# Patient Record
Sex: MALE | Race: BLACK OR AFRICAN AMERICAN | NOT HISPANIC OR LATINO | URBAN - METROPOLITAN AREA
[De-identification: names, ages, dates, MRNs, and addresses within clinical notes are randomized per-mention and may not be internally consistent; named-entity substitution may affect disease eponyms.]

---

## 2019-02-20 ENCOUNTER — EMERGENCY (EMERGENCY)
Facility: HOSPITAL | Age: 64
LOS: 0 days | Discharge: ANOTHER HOSPITAL | End: 2019-02-20
Attending: EMERGENCY MEDICINE
Payer: COMMERCIAL

## 2019-02-20 ENCOUNTER — INPATIENT (INPATIENT)
Facility: HOSPITAL | Age: 64
LOS: 1 days | Discharge: HOME | DRG: 287 | End: 2019-02-22
Attending: INTERNAL MEDICINE | Admitting: INTERNAL MEDICINE
Payer: COMMERCIAL

## 2019-02-20 VITALS
RESPIRATION RATE: 23 BRPM | WEIGHT: 168.65 LBS | HEIGHT: 71 IN | SYSTOLIC BLOOD PRESSURE: 119 MMHG | HEART RATE: 76 BPM | DIASTOLIC BLOOD PRESSURE: 82 MMHG | OXYGEN SATURATION: 99 % | TEMPERATURE: 98 F

## 2019-02-20 VITALS — SYSTOLIC BLOOD PRESSURE: 102 MMHG | DIASTOLIC BLOOD PRESSURE: 69 MMHG | HEART RATE: 116 BPM

## 2019-02-20 VITALS
SYSTOLIC BLOOD PRESSURE: 104 MMHG | WEIGHT: 173.94 LBS | RESPIRATION RATE: 19 BRPM | OXYGEN SATURATION: 98 % | HEART RATE: 120 BPM | DIASTOLIC BLOOD PRESSURE: 53 MMHG | TEMPERATURE: 98 F | HEIGHT: 71 IN

## 2019-02-20 DIAGNOSIS — I24.9 ACUTE ISCHEMIC HEART DISEASE, UNSPECIFIED: ICD-10-CM

## 2019-02-20 DIAGNOSIS — R07.9 CHEST PAIN, UNSPECIFIED: ICD-10-CM

## 2019-02-20 DIAGNOSIS — I48.91 UNSPECIFIED ATRIAL FIBRILLATION: ICD-10-CM

## 2019-02-20 DIAGNOSIS — R00.2 PALPITATIONS: ICD-10-CM

## 2019-02-20 DIAGNOSIS — J45.909 UNSPECIFIED ASTHMA, UNCOMPLICATED: ICD-10-CM

## 2019-02-20 DIAGNOSIS — I47.2 VENTRICULAR TACHYCARDIA: ICD-10-CM

## 2019-02-20 LAB
ALBUMIN SERPL ELPH-MCNC: 3.3 G/DL — SIGNIFICANT CHANGE UP (ref 3.3–5)
ALBUMIN SERPL ELPH-MCNC: 3.5 G/DL — SIGNIFICANT CHANGE UP (ref 3.3–5)
ALP SERPL-CCNC: 102 U/L — SIGNIFICANT CHANGE UP (ref 40–120)
ALP SERPL-CCNC: 116 U/L — SIGNIFICANT CHANGE UP (ref 40–120)
ALT FLD-CCNC: 135 U/L — HIGH (ref 10–45)
ALT FLD-CCNC: 160 U/L — HIGH (ref 12–78)
ANION GAP SERPL CALC-SCNC: 11 MMOL/L — SIGNIFICANT CHANGE UP (ref 5–17)
ANION GAP SERPL CALC-SCNC: 13 MMOL/L — SIGNIFICANT CHANGE UP (ref 5–17)
APTT BLD: 34.8 SEC — SIGNIFICANT CHANGE UP (ref 28.5–37)
APTT BLD: >200 SEC — CRITICAL HIGH (ref 27.5–36.3)
AST SERPL-CCNC: 115 U/L — HIGH (ref 10–40)
AST SERPL-CCNC: 130 U/L — HIGH (ref 15–37)
BILIRUB SERPL-MCNC: 0.6 MG/DL — SIGNIFICANT CHANGE UP (ref 0.2–1.2)
BILIRUB SERPL-MCNC: 0.9 MG/DL — SIGNIFICANT CHANGE UP (ref 0.2–1.2)
BLD GP AB SCN SERPL QL: NEGATIVE — SIGNIFICANT CHANGE UP
BUN SERPL-MCNC: 12 MG/DL — SIGNIFICANT CHANGE UP (ref 7–23)
BUN SERPL-MCNC: 12 MG/DL — SIGNIFICANT CHANGE UP (ref 7–23)
CALCIUM SERPL-MCNC: 8.7 MG/DL — SIGNIFICANT CHANGE UP (ref 8.5–10.1)
CALCIUM SERPL-MCNC: 8.9 MG/DL — SIGNIFICANT CHANGE UP (ref 8.4–10.5)
CHLORIDE SERPL-SCNC: 102 MMOL/L — SIGNIFICANT CHANGE UP (ref 96–108)
CHLORIDE SERPL-SCNC: 98 MMOL/L — SIGNIFICANT CHANGE UP (ref 96–108)
CK MB BLD-MCNC: 1.4 % — SIGNIFICANT CHANGE UP (ref 0–3.5)
CK MB BLD-MCNC: 2.7 % — SIGNIFICANT CHANGE UP (ref 0–3.5)
CK MB CFR SERPL CALC: 1.9 NG/ML — SIGNIFICANT CHANGE UP (ref 0.5–3.6)
CK MB CFR SERPL CALC: 3.1 NG/ML — SIGNIFICANT CHANGE UP (ref 0–6.7)
CK SERPL-CCNC: 116 U/L — SIGNIFICANT CHANGE UP (ref 30–200)
CK SERPL-CCNC: 137 U/L — SIGNIFICANT CHANGE UP (ref 26–308)
CO2 SERPL-SCNC: 25 MMOL/L — SIGNIFICANT CHANGE UP (ref 22–31)
CO2 SERPL-SCNC: 26 MMOL/L — SIGNIFICANT CHANGE UP (ref 22–31)
CREAT SERPL-MCNC: 1.21 MG/DL — SIGNIFICANT CHANGE UP (ref 0.5–1.3)
CREAT SERPL-MCNC: 1.63 MG/DL — HIGH (ref 0.5–1.3)
GLUCOSE SERPL-MCNC: 124 MG/DL — HIGH (ref 70–99)
GLUCOSE SERPL-MCNC: 191 MG/DL — HIGH (ref 70–99)
HCT VFR BLD CALC: 46.1 % — SIGNIFICANT CHANGE UP (ref 39–50)
HCT VFR BLD CALC: 48.4 % — SIGNIFICANT CHANGE UP (ref 39–50)
HGB BLD-MCNC: 15.4 G/DL — SIGNIFICANT CHANGE UP (ref 13–17)
HGB BLD-MCNC: 15.6 G/DL — SIGNIFICANT CHANGE UP (ref 13–17)
INR BLD: 2.84 RATIO — HIGH (ref 0.88–1.16)
INR BLD: 3.08 RATIO — HIGH (ref 0.88–1.16)
MAGNESIUM SERPL-MCNC: 1.8 MG/DL — SIGNIFICANT CHANGE UP (ref 1.6–2.6)
MCHC RBC-ENTMCNC: 27.8 PG — SIGNIFICANT CHANGE UP (ref 27–34)
MCHC RBC-ENTMCNC: 29.2 PG — SIGNIFICANT CHANGE UP (ref 27–34)
MCHC RBC-ENTMCNC: 32.2 GM/DL — SIGNIFICANT CHANGE UP (ref 32–36)
MCHC RBC-ENTMCNC: 33.4 GM/DL — SIGNIFICANT CHANGE UP (ref 32–36)
MCV RBC AUTO: 86.3 FL — SIGNIFICANT CHANGE UP (ref 80–100)
MCV RBC AUTO: 87.4 FL — SIGNIFICANT CHANGE UP (ref 80–100)
NRBC # BLD: 0 /100 WBCS — SIGNIFICANT CHANGE UP (ref 0–0)
NT-PROBNP SERPL-SCNC: 1967 PG/ML — HIGH (ref 0–300)
PHOSPHATE SERPL-MCNC: 1.8 MG/DL — LOW (ref 2.5–4.5)
PLATELET # BLD AUTO: 133 K/UL — LOW (ref 150–400)
PLATELET # BLD AUTO: 166 K/UL — SIGNIFICANT CHANGE UP (ref 150–400)
POTASSIUM SERPL-MCNC: 3.6 MMOL/L — SIGNIFICANT CHANGE UP (ref 3.5–5.3)
POTASSIUM SERPL-MCNC: 4.1 MMOL/L — SIGNIFICANT CHANGE UP (ref 3.5–5.3)
POTASSIUM SERPL-SCNC: 3.6 MMOL/L — SIGNIFICANT CHANGE UP (ref 3.5–5.3)
POTASSIUM SERPL-SCNC: 4.1 MMOL/L — SIGNIFICANT CHANGE UP (ref 3.5–5.3)
PROT SERPL-MCNC: 6.2 G/DL — SIGNIFICANT CHANGE UP (ref 6–8.3)
PROT SERPL-MCNC: 6.7 GM/DL — SIGNIFICANT CHANGE UP (ref 6–8.3)
PROTHROM AB SERPL-ACNC: 32.8 SEC — HIGH (ref 10–12.9)
PROTHROM AB SERPL-ACNC: 36.7 SEC — HIGH (ref 10–12.9)
RBC # BLD: 5.27 M/UL — SIGNIFICANT CHANGE UP (ref 4.2–5.8)
RBC # BLD: 5.61 M/UL — SIGNIFICANT CHANGE UP (ref 4.2–5.8)
RBC # FLD: 13.6 % — SIGNIFICANT CHANGE UP (ref 10.3–14.5)
RBC # FLD: 15.5 % — HIGH (ref 10.3–14.5)
RH IG SCN BLD-IMP: POSITIVE — SIGNIFICANT CHANGE UP
SODIUM SERPL-SCNC: 136 MMOL/L — SIGNIFICANT CHANGE UP (ref 135–145)
SODIUM SERPL-SCNC: 139 MMOL/L — SIGNIFICANT CHANGE UP (ref 135–145)
TROPONIN I SERPL-MCNC: <.015 NG/ML — SIGNIFICANT CHANGE UP (ref 0.01–0.04)
TROPONIN T, HIGH SENSITIVITY RESULT: 35 NG/L — SIGNIFICANT CHANGE UP (ref 0–51)
TSH SERPL-MCNC: 9.6 UIU/ML — HIGH (ref 0.36–3.74)
WBC # BLD: 5.02 K/UL — SIGNIFICANT CHANGE UP (ref 3.8–10.5)
WBC # BLD: 8.5 K/UL — SIGNIFICANT CHANGE UP (ref 3.8–10.5)
WBC # FLD AUTO: 5.02 K/UL — SIGNIFICANT CHANGE UP (ref 3.8–10.5)
WBC # FLD AUTO: 8.5 K/UL — SIGNIFICANT CHANGE UP (ref 3.8–10.5)

## 2019-02-20 PROCEDURE — 93306 TTE W/DOPPLER COMPLETE: CPT | Mod: 26

## 2019-02-20 PROCEDURE — 93010 ELECTROCARDIOGRAM REPORT: CPT

## 2019-02-20 PROCEDURE — 71045 X-RAY EXAM CHEST 1 VIEW: CPT | Mod: 26

## 2019-02-20 PROCEDURE — 99291 CRITICAL CARE FIRST HOUR: CPT

## 2019-02-20 RX ORDER — ASPIRIN/CALCIUM CARB/MAGNESIUM 324 MG
81 TABLET ORAL DAILY
Qty: 0 | Refills: 0 | Status: DISCONTINUED | OUTPATIENT
Start: 2019-02-20 | End: 2019-02-22

## 2019-02-20 RX ORDER — AMIODARONE HYDROCHLORIDE 400 MG/1
1 TABLET ORAL
Qty: 900 | Refills: 0 | Status: DISCONTINUED | OUTPATIENT
Start: 2019-02-20 | End: 2019-02-20

## 2019-02-20 RX ORDER — MAGNESIUM SULFATE 500 MG/ML
2 VIAL (ML) INJECTION ONCE
Qty: 0 | Refills: 0 | Status: COMPLETED | OUTPATIENT
Start: 2019-02-20 | End: 2019-02-20

## 2019-02-20 RX ORDER — HEPARIN SODIUM 5000 [USP'U]/ML
INJECTION INTRAVENOUS; SUBCUTANEOUS
Qty: 25000 | Refills: 0 | Status: DISCONTINUED | OUTPATIENT
Start: 2019-02-20 | End: 2019-02-20

## 2019-02-20 RX ORDER — HEPARIN SODIUM 5000 [USP'U]/ML
700 INJECTION INTRAVENOUS; SUBCUTANEOUS
Qty: 25000 | Refills: 0 | Status: DISCONTINUED | OUTPATIENT
Start: 2019-02-20 | End: 2019-02-21

## 2019-02-20 RX ORDER — CHLORHEXIDINE GLUCONATE 213 G/1000ML
1 SOLUTION TOPICAL
Qty: 0 | Refills: 0 | Status: DISCONTINUED | OUTPATIENT
Start: 2019-02-20 | End: 2019-02-22

## 2019-02-20 RX ORDER — FUROSEMIDE 40 MG
20 TABLET ORAL ONCE
Qty: 0 | Refills: 0 | Status: COMPLETED | OUTPATIENT
Start: 2019-02-20 | End: 2019-02-20

## 2019-02-20 RX ORDER — HEPARIN SODIUM 5000 [USP'U]/ML
4000 INJECTION INTRAVENOUS; SUBCUTANEOUS EVERY 6 HOURS
Qty: 0 | Refills: 0 | Status: DISCONTINUED | OUTPATIENT
Start: 2019-02-20 | End: 2019-02-20

## 2019-02-20 RX ORDER — AMIODARONE HYDROCHLORIDE 400 MG/1
150 TABLET ORAL ONCE
Qty: 0 | Refills: 0 | Status: COMPLETED | OUTPATIENT
Start: 2019-02-20 | End: 2019-02-20

## 2019-02-20 RX ORDER — HEPARIN SODIUM 5000 [USP'U]/ML
4700 INJECTION INTRAVENOUS; SUBCUTANEOUS EVERY 6 HOURS
Qty: 0 | Refills: 0 | Status: DISCONTINUED | OUTPATIENT
Start: 2019-02-20 | End: 2019-02-21

## 2019-02-20 RX ORDER — HEPARIN SODIUM 5000 [USP'U]/ML
4000 INJECTION INTRAVENOUS; SUBCUTANEOUS ONCE
Qty: 0 | Refills: 0 | Status: COMPLETED | OUTPATIENT
Start: 2019-02-20 | End: 2019-02-20

## 2019-02-20 RX ORDER — AMIODARONE HYDROCHLORIDE 400 MG/1
400 TABLET ORAL EVERY 8 HOURS
Qty: 0 | Refills: 0 | Status: DISCONTINUED | OUTPATIENT
Start: 2019-02-20 | End: 2019-02-21

## 2019-02-20 RX ORDER — ASPIRIN/CALCIUM CARB/MAGNESIUM 324 MG
325 TABLET ORAL ONCE
Qty: 0 | Refills: 0 | Status: COMPLETED | OUTPATIENT
Start: 2019-02-20 | End: 2019-02-20

## 2019-02-20 RX ADMIN — AMIODARONE HYDROCHLORIDE 400 MILLIGRAM(S): 400 TABLET ORAL at 21:05

## 2019-02-20 RX ADMIN — AMIODARONE HYDROCHLORIDE 618 MILLIGRAM(S): 400 TABLET ORAL at 16:54

## 2019-02-20 RX ADMIN — HEPARIN SODIUM 950 UNIT(S)/HR: 5000 INJECTION INTRAVENOUS; SUBCUTANEOUS at 17:28

## 2019-02-20 RX ADMIN — HEPARIN SODIUM 4000 UNIT(S): 5000 INJECTION INTRAVENOUS; SUBCUTANEOUS at 17:22

## 2019-02-20 RX ADMIN — AMIODARONE HYDROCHLORIDE 33.33 MG/MIN: 400 TABLET ORAL at 17:33

## 2019-02-20 RX ADMIN — HEPARIN SODIUM 950 UNIT(S)/HR: 5000 INJECTION INTRAVENOUS; SUBCUTANEOUS at 22:05

## 2019-02-20 RX ADMIN — Medication 62.5 MILLIMOLE(S): at 23:08

## 2019-02-20 RX ADMIN — Medication 50 GRAM(S): at 22:13

## 2019-02-20 RX ADMIN — Medication 325 MILLIGRAM(S): at 16:59

## 2019-02-20 RX ADMIN — HEPARIN SODIUM 700 UNIT(S)/HR: 5000 INJECTION INTRAVENOUS; SUBCUTANEOUS at 23:12

## 2019-02-20 RX ADMIN — Medication 20 MILLIGRAM(S): at 22:13

## 2019-02-20 RX ADMIN — AMIODARONE HYDROCHLORIDE 33.33 MG/MIN: 400 TABLET ORAL at 20:04

## 2019-02-20 NOTE — ED ADULT NURSE NOTE - CHPI ED NUR SYMPTOMS NEG
no chills/no syncope/no vomiting/no back pain/no shortness of breath/no fever/no congestion/no diaphoresis

## 2019-02-20 NOTE — ED PROVIDER NOTE - OBJECTIVE STATEMENT
64yo male with pmh asthma, Afib on bystolic 5mg and eliquis (since last year) presents with walking up stairs then feeling palpitations, chest tightness, dizziness and weakness. Pt took his meds and attempted to wait it out but kept having palpitations and more weakness so came to er. no dizziness currently.    ROS: No fever/chills. No photophobia/eye pain/changes in vision, No ear pain/sore throat/dysphagia, + chest pain/palpitations. No SOB/cough/stridor. No abdominal pain, N/V/D, no black/bloody bm. No dysuria/frequency/discharge, No headache. + Dizziness.  No rash.  No numbness/tingling/weakness.

## 2019-02-20 NOTE — ED PROVIDER NOTE - PHYSICAL EXAMINATION
Gen: Alert, Well appearing. NAD    Head: NC, AT, PERRL, EOMI, normal lids/conjunctiva   ENT: Bilateral TM WNL, normal hearing, patent oropharynx without erythema/exudate, uvula midline  Neck: supple, no tenderness/meningismus/JVD   Pulm: Bilateral clear BS, normal resp effort, no wheeze/stridor/retractions  CV: + tachy, no M/R/G, +dist pulses   Abd: soft, NT/ND, +BS, no guarding/rebound tenderness  Mskel: no edema/erythema/cyanosis   Skin: no rash   Neuro: AAOx3, no sensory/motor deficits, CN 2-12 intact

## 2019-02-20 NOTE — H&P ADULT - HISTORY OF PRESENT ILLNESS
63-yo M w/ PMH of asthma and Afib on Bystolic and Eliquis (x1y), p/w palpitations and chest tightness. Patient experienced palpitations, chest tightness, and dizziness while walking up stairs. Patient waited until the symptoms go away, however palpitations and weakness worsened. Patient has never experienced these symptoms before, and states he has no history of MI. Per patient, he was undergone exercise stress test and TTE last year. He was told "everything was okay."    In [place] ED, 63-yo M w/ PMH of asthma and Afib on Bystolic and Eliquis (x1y), p/w palpitations and chest tightness. Patient experienced palpitations, chest tightness, and dizziness while walking up stairs. Patient waited until the symptoms go away, however palpitations and weakness worsened. Poorly described pain of 5/10 quantity. No positional exacerbation or palliation. Pain is not worsened with breathing. Patient has never experienced these symptoms before, and states he has no history of MI. Per patient, he was undergone exercise stress test and TTE last year. He was told "everything was okay." In [place] ED, T 98, -235, BP /50-69, RR 14-19, sat 98% on RA. EKG showed sustained monomorphic VT. Amiodarone 150 mg IV was given. Patient was sent to [facility] for possible catheterization. Of note, patient states pain is worsened during TTE when the probe is pressed into the chest wall on midclavicular, lower L chest. Peter Lieberman MD, PhD | PGY-1  Department of Internal Medicine  Pager [phone] ([facility]) / [phone] ([facility])      63-yo M w/ PMH of asthma and Afib on Bystolic and Eliquis (x1y), p/w palpitations and chest tightness. Patient experienced palpitations, chest tightness, and dizziness while walking up stairs. Patient waited until the symptoms go away, however palpitations and weakness worsened. Poorly described pain of 5/10 quantity. No positional exacerbation or palliation. Pain is not worsened with breathing. Patient has never experienced these symptoms before, and states he has no history of MI. Per patient, he was undergone exercise stress test and TTE last year. He was told "everything was okay." In [place] ED, T 98, -235, BP /50-69, RR 14-19, sat 98% on RA. EKG showed sustained monomorphic VT. Amiodarone 150 mg IV,  mg, and heparin gtt were given. Patient was sent to [facility] for possible catheterization. Of note, patient states pain is worsened during TTE when the probe is pressed into the chest wall on midclavicular, lower L chest.

## 2019-02-20 NOTE — H&P ADULT - NSHPPHYSICALEXAM_GEN_ALL_CORE
GENERAL: NAD, well-groomed, well-developed  HEAD: Atraumatic, Normocephalic  EYES: EOMI, PERRLA, conjunctiva and sclera clear  ENMT: No tonsillar erythema, exudates, or enlargement; Moist mucous membranes, Good dentition  NECK: Supple, No JVD  NERVOUS SYSTEM: AOX3, motor and sensation grossly intact in b/l UE and b/l LE  PSYCHIATRIC: Appropriate affect and mood  CHEST/LUNG: Clear to auscultation bilaterally; No rales, rhonchi, wheezing, or rubs  HEART: Regular rate and rhythm; No murmurs, rubs, or gallops.  ABDOMEN: Soft, Nontender, Nondistended; Bowel sounds present  EXTREMITIES:  2+ Peripheral Pulses; +1 pitting edema BLE  SKIN: No rashes or lesions GENERAL: NAD, well-groomed, well-developed  HEAD: Atraumatic, Normocephalic  EYES: EOMI, PERRLA, conjunctiva and sclera clear  ENMT: No tonsillar erythema, exudates, or enlargement; Moist mucous membranes, Good dentition  NECK: Supple, No JVD  NERVOUS SYSTEM: AOX3, motor and sensation grossly intact in b/l UE and b/l LE  PSYCHIATRIC: Appropriate affect and mood  CHEST/LUNG: Clear to auscultation bilaterally; No rales, rhonchi, wheezing, or rubs  HEART: Regular rate and rhythm; No murmurs, rubs, or gallops; C/o pain localized midclavicular lower chest on palpation with TTE probe  ABDOMEN: Soft, Nontender, Nondistended; Bowel sounds present  EXTREMITIES:  2+ Peripheral Pulses; +1 pitting edema BLE  SKIN: No rashes or lesions

## 2019-02-20 NOTE — H&P ADULT - ASSESSMENT
63-yo M w/ PMH of asthma and 1-year history of Afib on Bystolic and Eliquis, presented to VS with palpitations and chest tightness, transferred for further workup.    #Neuro  - No issues at this time    #CV  - P/w palpitation and chest tightness a/w exertion in the setting of a-fib on Eliquis and Bystolic.  - EKG consistent with sustained monomorphic VT  - TTE performed. Pending review  - C/w ASA, heparin gtt  -     #Pulm  - No issues at this time    #Renal  - P/w SCr 1.63. Unknown baseline  - May give saline bolus if TTE WNL.    #GI  - DASH diet    #Endo  - F/u TSH, lipid panel, A1C    #Heme  - No issues at this time    #DVT PPx  - Heparin gtt 63-yo M w/ PMH of asthma and 1-year history of Afib on Bystolic and Eliquis, presented to VS with palpitations and chest tightness, transferred for further workup.    #Neuro  - No issues at this time    #CV  - P/w palpitation and chest tightness a/w exertion in the setting of a-fib on Eliquis and Bystolic. CHADSVASC 0.  - EKG consistent with sustained monomorphic VT  - TTE performed. Pending review  - C/w ASA, heparin gtt  - C/w amiodarone gtt and 400 mg PO TID  - LHC per EP rec  - Daily EKG, keep on telemetry  - Keep K>4 and Mg>2    #Pulm  - No issues at this time    #Renal  - P/w SCr 1.63. Unknown baseline  - May give saline bolus if TTE WNL.    #GI  - DASH diet    #Endo  - F/u TSH, lipid panel, A1C    #Heme  - No issues at this time    #DVT PPx  - Heparin gtt

## 2019-02-20 NOTE — ED ADULT NURSE NOTE - OBJECTIVE STATEMENT
Pt A + O x 4, c/o chest pressure and palpitations with dizziness and nausea since 10 am.  Denies SOB.  Pt compliant with his medications.

## 2019-02-20 NOTE — ED PROVIDER NOTE - PROGRESS NOTE DETAILS
upon coming to evaluate, pt, noted in VT, , BP 80, however pt appears very comfortable. amiodorone 150mg drip started, ~ 5 min in, hr in 115, repeat EKG aflutter with lateral stemi Pt evaluated at 1610, and placed on monitor by myself and noted in VT.  , BP 80, however pt appears very comfortable. amiodorone 150mg drip started, ~ 5 min in, hr in 115, repeat EKG aflutter with lateral stemi ekg with lateral stemi, transfer center called. still trying to get cath lab. dr jean from interventional center, viewed ekg, states not stemi, for transfer to CCU. now awaiting ccu.

## 2019-02-20 NOTE — CONSULT NOTE ADULT - SUBJECTIVE AND OBJECTIVE BOX
Reason for Consultation: VT  Chief Complaint: Chest Pain  Date of Admission: 2/20/19    HPI:      Past Medical History:   Atrial fibrillation  Asthma      Past Surgical History:       Medications:   amiodarone    Tablet 400 milliGRAM(s) Oral every 8 hours  amiodarone Infusion 1 mG/Min IV Continuous <Continuous>  aspirin enteric coated 81 milliGRAM(s) Oral daily  chlorhexidine 4% Liquid 1 Application(s) Topical <User Schedule>  heparin  Infusion.  Unit(s)/Hr IV Continuous <Continuous>  heparin  Injectable 4700 Unit(s) IV Push every 6 hours PRN      Allergies:  No Known Allergies      FAMILY HISTORY:      Social History:  Tobacco Use: denies  Alcohol Use: denies  Drug Use: denies    Review of Systems:  REVIEW OF SYSTEMS:  CONSTITUTIONAL: No weakness, fevers or chills  EYES/ENT: No visual changes;  No dysphagia  NECK: No pain or stiffness  RESPIRATORY: No cough, wheezing, hemoptysis; No shortness of breath  CARDIOVASCULAR: No chest pain or palpitations; No lower extremity edema  GASTROINTESTINAL: No abdominal or epigastric pain. No nausea, vomiting, or hematemesis; No diarrhea or constipation. No melena or hematochezia.  BACK: No back pain  GENITOURINARY: No dysuria, frequency or hematuria  NEUROLOGICAL: No numbness or weakness  SKIN: No itching, burning, rashes, or lesions   All other review of systems is negative unless indicated above.    Vitals:  T(F): 98.5 (02-20), Max: 98.5 (02-20)  HR: 76 (02-20) (76 - 235)  BP: 119/82 (02-20) (73/50 - 119/82)  RR: 23 (02-20)  SpO2: 99% (02-20)    Physical Exam:  GENERAL: No acute distress, well-developed  HEAD:  Atraumatic, Normocephalic  ENT: EOMI, PERRLA, conjunctiva and sclera clear, Neck supple, No JVD, moist mucosa  CHEST/LUNG: Clear to auscultation bilaterally; No wheeze, equal breath sounds bilaterally   BACK: No spinal tenderness  HEART: Regular rate and rhythm; No murmurs, rubs, or gallops  ABDOMEN: Soft, Nontender, Nondistended; Bowel sounds present  EXTREMITIES:  No clubbing, cyanosis, or edema  PSYCH: Nl behavior, nl affect  NEUROLOGY: AAOx3, non-focal, cranial nerves intact  SKIN: Normal color, No rashes or lesions  LINES:    Labs: Personally reviewed                        15.6   5.02  )-----------( 166      ( 20 Feb 2019 17:02 )             48.4     02-20    139  |  102  |  12  ----------------------------<  124<H>  3.6   |  26  |  1.63<H>    Ca    8.7      20 Feb 2019 17:02    TPro  6.7  /  Alb  3.3  /  TBili  0.9  /  DBili  x   /  AST  130<H>  /  ALT  160<H>  /  AlkPhos  116  02-20    PT/INR - ( 20 Feb 2019 17:02 )   PT: 32.8 sec;   INR: 2.84 ratio         PTT - ( 20 Feb 2019 17:02 )  PTT:34.8 sec  CARDIAC MARKERS ( 20 Feb 2019 17:02 )  <.015 ng/mL / x     / 137 U/L / x     / 1.9 ng/mL            Thyroid Stimulating Hormone, Serum: 9.600 uIU/mL (02-20 @ 17:02)      CARDIOVASCULAR DIAGNOSTIC TESTING:  Telemetry:  ECG: Personally Reviewed    [ ] Echocardiogram:  [ ] Stress Test:  [ ] Catheterization:    RADIOLOGY: Reason for Consultation: VT  Chief Complaint: Chest Pain  Date of Admission: 2/20/19    HPI:  63M w/ PMH AFib on Eliquis and Asthms presents with walking up stairs then feeling palpitations, chest tightness, dizziness and weakness. Pt took his meds and attempted to wait it out but kept having palpitations and more weakness so came to er. no dizziness currently. No fevers, chills, cough. No nausea, vomiting, abdominal pain. Transferred to [facility] for further evaluation.     Per [facility] ED, patient noted in VT.  , BP 80, however pt appears very comfortable. amiodorone 150mg drip started, ~ 5 min in, hr in 115, repeat EKG aflutter with lateral stem    EP consulted.   EKG 2/20/19 16:27 shows monomorphic VT with ventricular rate 235 bpm.  EKG 2/20/19 16:44 shows Atrial Flutter with 2:1 conduction, ventricular rate 125 bmp, p-waves negative inferiorly and positive in V1, consistent with typical flutter.   EKG 2/20/19 17:14 shows NSR, normal intervals, no ST chanages.    Past Medical History:   Atrial fibrillation  Asthma    Past Surgical History:   None    Medications:   amiodarone    Tablet 400 milliGRAM(s) Oral every 8 hours  amiodarone Infusion 1 mG/Min IV Continuous <Continuous>  aspirin enteric coated 81 milliGRAM(s) Oral daily  chlorhexidine 4% Liquid 1 Application(s) Topical <User Schedule>  heparin  Infusion.  Unit(s)/Hr IV Continuous <Continuous>  heparin  Injectable 4700 Unit(s) IV Push every 6 hours PRN    Allergies:  No Known Allergies    FAMILY HISTORY:  Noncontributory     Social History:  Tobacco Use: denies  Alcohol Use: denies  Drug Use: denies    Review of Systems:  REVIEW OF SYSTEMS:  CONSTITUTIONAL: as per HPI  EYES/ENT: No visual changes;  No dysphagia  NECK: No pain or stiffness  RESPIRATORY: No cough, wheezing, hemoptysis; No shortness of breath  CARDIOVASCULAR: as per HPI  GASTROINTESTINAL: No abdominal or epigastric pain. No nausea, vomiting, or hematemesis; No diarrhea or constipation. No melena or hematochezia.  BACK: No back pain  GENITOURINARY: No dysuria, frequency or hematuria  NEUROLOGICAL: No numbness or weakness  SKIN: No itching, burning, rashes, or lesions   All other review of systems is negative unless indicated above.    Vitals:  T(F): 98.5 (02-20), Max: 98.5 (02-20)  HR: 76 (02-20) (76 - 235)  BP: 119/82 (02-20) (73/50 - 119/82)  RR: 23 (02-20)  SpO2: 99% (02-20)    Physical Exam:  GENERAL: No acute distress, well-developed  HEAD:  Atraumatic, Normocephalic  ENT: EOMI, PERRLA, conjunctiva and sclera clear, Neck supple, No JVD, moist mucosa  CHEST/LUNG: Clear to auscultation bilaterally; No wheeze, equal breath sounds bilaterally   HEART: Regular rate and rhythm; No murmurs, rubs, or gallops  ABDOMEN: Soft, Nontender, Nondistended; Bowel sounds present  EXTREMITIES:  No clubbing, cyanosis, or edema  PSYCH: Nl behavior, nl affect  NEUROLOGY: AAOx3, non-focal, cranial nerves intact  SKIN: Normal color, No rashes or lesions      Labs: Personally reviewed                        15.6   5.02  )-----------( 166      ( 20 Feb 2019 17:02 )             48.4     02-20    139  |  102  |  12  ----------------------------<  124<H>  3.6   |  26  |  1.63<H>    Ca    8.7      20 Feb 2019 17:02    TPro  6.7  /  Alb  3.3  /  TBili  0.9  /  DBili  x   /  AST  130<H>  /  ALT  160<H>  /  AlkPhos  116  02-20    PT/INR - ( 20 Feb 2019 17:02 )   PT: 32.8 sec;   INR: 2.84 ratio         PTT - ( 20 Feb 2019 17:02 )  PTT:34.8 sec  CARDIAC MARKERS ( 20 Feb 2019 17:02 )  <.015 ng/mL / x     / 137 U/L / x     / 1.9 ng/mL    Thyroid Stimulating Hormone, Serum: 9.600 uIU/mL (02-20 @ 17:02)      CARDIOVASCULAR DIAGNOSTIC TESTING:  Telemetry:  ECG: Personally Reviewed    [ ] Echocardiogram:  [ ] Stress Test:  [ ] Catheterization:    RADIOLOGY: Reason for Consultation: VT  Chief Complaint: Chest Pain  Date of Admission: 2/20/19    HPI:  63M non-smoker w/ PMH AFib on Eliquis and Asthma presents with chest tightness, palpitations, dizziness, and weakness after walking up the stairs. No associated SOB or diaphoresis. Reports taking his medications and attempting to wait it out but kept having palpitations and more weakness prompting him to go to ED. No fevers, chills, cough. No nausea, vomiting, abdominal pain. No LE swelling, PND, orthopnea.       In [facility] ED, found to be in sustained VT with HR 230s but appeared comfortable. Amio 150mg IVP administered and gtt started, approx. 5 min later, patient broke into Atrial Flutter and then eventually NSR. Transferred to [facility] for further evaluation. Reports exercise stress test approx. 1 year ago which was normal. Denies hx of CAD, CHF, VT.    Review of EKG as below:  EKG 2/20/19 16:27 shows monomorphic VT with ventricular rate 235 bpm.  EKG 2/20/19 16:44 shows Atrial Flutter with 2:1 conduction, ventricular rate 125 bmp, p-waves negative inferiorly and positive in V1, consistent with typical flutter.   EKG 2/20/19 17:14 shows NSR, normal intervals, no ST changes    Past Medical History:   Atrial fibrillation  Asthma    Past Surgical History:   None    Medications:   amiodarone    Tablet 400 milliGRAM(s) Oral every 8 hours  amiodarone Infusion 1 mG/Min IV Continuous <Continuous>  aspirin enteric coated 81 milliGRAM(s) Oral daily  chlorhexidine 4% Liquid 1 Application(s) Topical <User Schedule>  heparin  Infusion.  Unit(s)/Hr IV Continuous <Continuous>  heparin  Injectable 4700 Unit(s) IV Push every 6 hours PRN    Allergies:  No Known Allergies    FAMILY HISTORY:  Noncontributory     Social History:  Tobacco Use: denies  Alcohol Use: denies  Drug Use: denies    Review of Systems:  REVIEW OF SYSTEMS:  CONSTITUTIONAL: as per HPI  EYES/ENT: No visual changes;  No dysphagia  NECK: No pain or stiffness  RESPIRATORY: No cough, wheezing, hemoptysis; No shortness of breath  CARDIOVASCULAR: as per HPI  GASTROINTESTINAL: No abdominal or epigastric pain. No nausea, vomiting, or hematemesis; No diarrhea or constipation. No melena or hematochezia.  BACK: No back pain  GENITOURINARY: No dysuria, frequency or hematuria  NEUROLOGICAL: No numbness or weakness  SKIN: No itching, burning, rashes, or lesions   All other review of systems is negative unless indicated above.    Vitals:  T(F): 98.5 (02-20), Max: 98.5 (02-20)  HR: 76 (02-20) (76 - 235)  BP: 119/82 (02-20) (73/50 - 119/82)  RR: 23 (02-20)  SpO2: 99% (02-20)    Physical Exam:  GENERAL: No acute distress, well-developed  HEAD:  Atraumatic, Normocephalic  ENT: EOMI, PERRLA, conjunctiva and sclera clear, Neck supple, No JVD, moist mucosa  CHEST/LUNG: Clear to auscultation bilaterally; No wheeze, equal breath sounds bilaterally   HEART: Regular rate and rhythm; No murmurs, rubs, or gallops  ABDOMEN: Soft, Nontender, Nondistended; Bowel sounds present  EXTREMITIES:  No clubbing, cyanosis, or edema  PSYCH: Nl behavior, nl affect  NEUROLOGY: AAOx3, non-focal, cranial nerves intact  SKIN: Normal color, No rashes or lesions      Labs: Personally reviewed                        15.6   5.02  )-----------( 166      ( 20 Feb 2019 17:02 )             48.4     02-20    139  |  102  |  12  ----------------------------<  124<H>  3.6   |  26  |  1.63<H>    Ca    8.7      20 Feb 2019 17:02    TPro  6.7  /  Alb  3.3  /  TBili  0.9  /  DBili  x   /  AST  130<H>  /  ALT  160<H>  /  AlkPhos  116  02-20    PT/INR - ( 20 Feb 2019 17:02 )   PT: 32.8 sec;   INR: 2.84 ratio         PTT - ( 20 Feb 2019 17:02 )  PTT:34.8 sec  CARDIAC MARKERS ( 20 Feb 2019 17:02 )  <.015 ng/mL / x     / 137 U/L / x     / 1.9 ng/mL    Thyroid Stimulating Hormone, Serum: 9.600 uIU/mL (02-20 @ 17:02)      CARDIOVASCULAR DIAGNOSTIC TESTING:  Telemetry:  ECG: Personally Reviewed    [ ] Echocardiogram:  [ ] Stress Test:  [ ] Catheterization:    RADIOLOGY: Reason for Consultation: VT  Chief Complaint: Chest Pain  Date of Admission: 2/20/19    HPI:  63M non-smoker w/ PMH AFib on Eliquis and Asthma presents with chest tightness, palpitations, dizziness, and weakness after walking up the stairs. No associated SOB or diaphoresis. Reports taking his medications and attempting to wait it out however palpitations and weakness worsened prompting him to go to ED. No fevers, chills, cough. No nausea, vomiting, abdominal pain. No LE swelling, PND, orthopnea.       In [facility] ED, found to be in sustained monomorphic VT with HR 230s but appeared comfortable. Amio 150mg IVP administered and gtt started, approx. 5 min later, patient broke into Atrial Flutter and then eventually NSR. Transferred to [facility] for further evaluation. Reports exercise stress test approx. 1 year ago which was normal. Denies hx of CAD, CHF, VT.    Review of EKG as below:  EKG 2/20/19 16:27: monomorphic VT with ventricular rate 235 bpm.  EKG 2/20/19 16:44: Atrial Flutter with 2:1 conduction, ventricular rate 125 bmp, p-waves negative inferiorly and positive in V1, consistent with typical flutter.   EKG 2/20/19 17:14: NSR, normal intervals, no ST changes    Past Medical History:   Atrial fibrillation  Asthma    Past Surgical History:   None    Medications:   amiodarone    Tablet 400 milliGRAM(s) Oral every 8 hours  amiodarone Infusion 1 mG/Min IV Continuous <Continuous>  aspirin enteric coated 81 milliGRAM(s) Oral daily  chlorhexidine 4% Liquid 1 Application(s) Topical <User Schedule>  heparin  Infusion.  Unit(s)/Hr IV Continuous <Continuous>  heparin  Injectable 4700 Unit(s) IV Push every 6 hours PRN    Allergies:  No Known Allergies    FAMILY HISTORY:  Noncontributory     Social History:  Tobacco Use: denies  Alcohol Use: denies  Drug Use: denies    REVIEW OF SYSTEMS:  CONSTITUTIONAL: as per HPI  EYES/ENT: No visual changes;  No dysphagia  NECK: No pain or stiffness  RESPIRATORY: No cough, wheezing, hemoptysis; No shortness of breath  CARDIOVASCULAR: as per HPI  GASTROINTESTINAL: No abdominal or epigastric pain. No nausea, vomiting, or hematemesis; No diarrhea or constipation. No melena or hematochezia.  BACK: No back pain  GENITOURINARY: No dysuria, frequency or hematuria  NEUROLOGICAL: No numbness or weakness  SKIN: No itching, burning, rashes, or lesions   All other review of systems is negative unless indicated above.    Vitals:  T(F): 98.5 (02-20), Max: 98.5 (02-20)  HR: 76 (02-20) (76 - 235)  BP: 119/82 (02-20) (73/50 - 119/82)  RR: 23 (02-20)  SpO2: 99% (02-20)    Physical Exam:  GENERAL: No acute distress, well-developed  HEAD:  Atraumatic, Normocephalic  ENT: EOMI, PERRLA, conjunctiva and sclera clear, Neck supple, No JVD, moist mucosa  CHEST/LUNG: Clear to auscultation bilaterally; No wheeze, equal breath sounds bilaterally   HEART: Regular rate and rhythm; No murmurs, rubs, or gallops  ABDOMEN: Soft, Nontender, Nondistended; Bowel sounds present  EXTREMITIES:  No clubbing, cyanosis, or edema  PSYCH: Nl behavior, nl affect  NEUROLOGY: AAOx3, non-focal, cranial nerves intact  SKIN: Normal color, No rashes or lesions      Labs: Personally reviewed                        15.6   5.02  )-----------( 166      ( 20 Feb 2019 17:02 )             48.4     02-20    139  |  102  |  12  ----------------------------<  124<H>  3.6   |  26  |  1.63<H>    Ca    8.7      20 Feb 2019 17:02    TPro  6.7  /  Alb  3.3  /  TBili  0.9  /  DBili  x   /  AST  130<H>  /  ALT  160<H>  /  AlkPhos  116  02-20    PT/INR - ( 20 Feb 2019 17:02 )   PT: 32.8 sec;   INR: 2.84 ratio         PTT - ( 20 Feb 2019 17:02 )  PTT:34.8 sec  CARDIAC MARKERS ( 20 Feb 2019 17:02 )  <.015 ng/mL / x     / 137 U/L / x     / 1.9 ng/mL    Thyroid Stimulating Hormone, Serum: 9.600 uIU/mL (02-20 @ 17:02)      CARDIOVASCULAR DIAGNOSTIC TESTING:  Telemetry:  ECG: Personally Reviewed    [ ] Echocardiogram:  [ ] Stress Test:  [ ] Catheterization:    RADIOLOGY:

## 2019-02-20 NOTE — CONSULT NOTE ADULT - ASSESSMENT
Sustained Monomorphic VT  -scar mediated, no hx of CAD, exercise ST approx. 1 year ago normal per patient  -obtain Echo to assess EF and LV wall thickness  -check LDL, TSH, A1c  -[facility] for ischemic evaluation  -will need ICD after ischemic evaluation for secondary prevention  -d/c Amio gtt, start Amio 400mg TID for 10g load    Atrial Fibrillation/Atrial Flutter  -rate-controlled, CHADSVASC = 0  -on Eliquis as outpatient, however given CHADSVASC score can consider stopping  -evaluation for Atrial Flutter ablation    Fer Crawford M.D.  Cardiology Fellow 63M w/ PMH AFib on Eliquis and Asthma presents with chest tightness and palpitations, found to be in sustained monomorphic VT s/p Amio 150mg IVP subsequently breaking into Atrial Flutter and then NSR.     Sustained Monomorphic VT  -scar mediated, no hx of CAD, exercise ST approx. 1 year ago normal per patient  -hsT negative x1  -obtain Echo to assess EF and LV wall thickness  -check LDL, TSH, A1c  -[facility] for ischemic evaluation  -will need ICD after ischemic evaluation for secondary prevention  -d/c Amio gtt, start Amio 400mg TID for 10g load    Atrial Fibrillation/Atrial Flutter  -rate-controlled, CHADSVASC = 0  -on Eliquis as outpatient, however given CHADSVASC score can consider stopping  -evaluation for Atrial Flutter ablation    Fer Crawford M.D.  Cardiology Fellow      Fer Crawford M.D.  Cardiology Fellow 63M w/ PMH AFib on Eliquis and Asthma presents with chest tightness and palpitations, found to be in sustained monomorphic VT s/p Amio 150mg IVP subsequently breaking into Atrial Flutter and then NSR.     Sustained Monomorphic VT  -scar mediated?, no hx of CAD, exercise ST approx. 1 year ago normal per patient  -hsT negative x1  -obtain Echo to assess EF and LV wall thickness  -check LDL, TSH, A1c  -[facility] for ischemic evaluation  -will need ICD after ischemic evaluation for secondary prevention  -d/c Amio gtt, start Amio 400mg TID for 10g load  -monitor on telemetry  -maintain K >4 and Mg >2    Atrial Fibrillation/Atrial Flutter  -rate-controlled, CHADSVASC = 0  -on Eliquis as outpatient, however given CHADSVASC score can consider stopping  -evaluation for Atrial Flutter ablation    Fer Crawford M.D.  Cardiology Fellow 63M w/ PMH AFib on Eliquis and Asthma presents with chest tightness and palpitations, found to be in sustained monomorphic VT s/p Amio 150mg IVP subsequently breaking into Atrial Flutter and then NSR.     Sustained Monomorphic VT  -scar mediated?, no hx of CAD, exercise ST approx. 1 year ago normal per patient  -hsT negative x1  -obtain Echo to assess EF and LV wall thickness  -check LDL, TSH, A1c  -[facility] for ischemic evaluation  -will need ICD after ischemic evaluation for secondary prevention  -d/c Amio gtt, start Amio 400mg TID for 10g load  -monitor on telemetry  -maintain K >4 and Mg >2    Atrial Fibrillation/Typical Atrial Flutter  -rate-controlled, CHADSVASC = 0  -on Eliquis as outpatient, however given CHADSVASC score can consider stopping  -evaluation for Atrial Flutter ablation    Fer Crawford M.D.  Cardiology Fellow

## 2019-02-20 NOTE — CONSULT NOTE ADULT - ATTENDING COMMENTS
This was not VT, it was atrial flutter with 1:1 conduction and aberration. When amiodarone was given the patient began conducting 2:1 with the flutter rate identical to the WCT rate.  The patient has a Hx or recurrent symptomatic AF. We discussed options of medical management vs. ablation. Patient would like to proceed with ablation. He does however need a W/U for his new reduced EF. I would suggest a Cath and MRI.   Would discontinue amiodarone.

## 2019-02-20 NOTE — ED PROVIDER NOTE - ST/T WAVE
VTAC irregular, ludin I, avl qtc 461, ludin has resolves, no stt changes irregular,, afib, ludin I, avl

## 2019-02-20 NOTE — ED PROVIDER NOTE - CLINICAL SUMMARY MEDICAL DECISION MAKING FREE TEXT BOX
Pt in VT, s/p amio, hr no 83, bp improved, pt feeling better. d/w transfer/cath lab for transfer center for lights and sirens.

## 2019-02-20 NOTE — CHART NOTE - NSCHARTNOTEFT_GEN_A_CORE
====================  CCU MIDNIGHT ROUNDS  ====================    RACHEL SARAH  [id number]    ====================  SUMMARY: HPI:  Peter Lieberman MD, PhD | PGY-1  Department of Internal Medicine  Pager [phone] ([facility]) / [phone] ([facility])      63-yo M w/ PMH of asthma and Afib on Bystolic and Eliquis (x1y), p/w palpitations and chest tightness. Patient experienced palpitations, chest tightness, and dizziness while walking up stairs. Patient waited until the symptoms go away, however palpitations and weakness worsened. Poorly described pain of 5/10 quantity. No positional exacerbation or palliation. Pain is not worsened with breathing. Patient has never experienced these symptoms before, and states he has no history of MI. Per patient, he was undergone exercise stress test and TTE last year. He was told "everything was okay." In [facility] ED, T 98, -235, BP /50-69, RR 14-19, sat 98% on RA. EKG showed sustained monomorphic VT. Amiodarone 150 mg IV,  mg, and heparin gtt were given. Patient was sent to [facility] for possible catheterization. Of note, patient states pain is worsened during TTE when the probe is pressed into the chest wall on midclavicular, lower L chest. (20 Feb 2019 20:20)    ====================        ====================  NEW EVENTS:  TTE done tonight  lasix PO given for JVD, trace LE edema; severe TR on TTE  amio 10g PO load (gtt d/c)  ====================        ====================  VITALS (Last 12 hrs):  ====================    T(C): 36.8 (02-20-19 @ 23:00), Max: 36.9 (02-20-19 @ 19:32)  HR: 70 (02-20-19 @ 23:00) (70 - 235)  BP: 101/73 (02-20-19 @ 23:00) (73/50 - 119/82)  BP(mean): 84 (02-20-19 @ 23:00) (84 - 96)  RR: 18 (02-20-19 @ 23:00) (14 - 23)  SpO2: 99% (02-20-19 @ 23:00) (98% - 100%)      TELEMETRY: NSR        I&O's Summary    20 Feb 2019 07:01  -  20 Feb 2019 23:56  --------------------------------------------------------  IN: 454.6 mL / OUT: 700 mL / NET: -245.4 mL        ====================  PLAN:  # VT  - amio gtt changed to 10g PO load   - TTE results noted; valvular abnormalities  - diuresis PRN   - may require ischemic workup this admission  - replete lytes as needed  ====================    HEALTH ISSUES - PROBLEM Dx:      Savana Walters, CCU PA #[id number]

## 2019-02-20 NOTE — H&P ADULT - NSHPREVIEWOFSYSTEMS_GEN_ALL_CORE
CONSTITUTIONAL: No fever, weight loss, or fatigue  EYES: No eye pain, visual disturbances, or discharge  ENMT: No difficulty hearing; No sinus or throat pain  RESPIRATORY: No cough, wheezing, chills or hemoptysis; No shortness of breath  CARDIOVASCULAR: +chest pain; +palpitation; no dizziness or leg swelling  GASTROINTESTINAL: No abdominal or epigastric pain. No nausea, vomiting, or hematemesis; No diarrhea or constipation  GENITOURINARY: No dysuria  NEUROLOGICAL: No headaches, tingling, or numbness  SKIN: No itching, burning, rashes, or lesions

## 2019-02-20 NOTE — PATIENT PROFILE ADULT - BRADEN MOISTURE
29 yo G0 LMP 2 wks prior admitted to general surgery for management of recurrent intussusception presents w/ abnormal uterine bleeding and thickened endometrium, stable (4) rarely moist

## 2019-02-20 NOTE — ED ADULT TRIAGE NOTE - CHIEF COMPLAINT QUOTE
patient BIBA , c/o of chest pressure and palpitation started today  , patient denied difficulty breathing , denied N/V , denied headache , denied abdominal pain

## 2019-02-21 LAB
ALBUMIN SERPL ELPH-MCNC: 3.5 G/DL — SIGNIFICANT CHANGE UP (ref 3.3–5)
ALP SERPL-CCNC: 88 U/L — SIGNIFICANT CHANGE UP (ref 40–120)
ALT FLD-CCNC: 114 U/L — HIGH (ref 10–45)
ANION GAP SERPL CALC-SCNC: 13 MMOL/L — SIGNIFICANT CHANGE UP (ref 5–17)
APTT BLD: 88.7 SEC — HIGH (ref 27.5–36.3)
AST SERPL-CCNC: 79 U/L — HIGH (ref 10–40)
BILIRUB SERPL-MCNC: 0.7 MG/DL — SIGNIFICANT CHANGE UP (ref 0.2–1.2)
BUN SERPL-MCNC: 11 MG/DL — SIGNIFICANT CHANGE UP (ref 7–23)
CALCIUM SERPL-MCNC: 8.9 MG/DL — SIGNIFICANT CHANGE UP (ref 8.4–10.5)
CHLORIDE SERPL-SCNC: 100 MMOL/L — SIGNIFICANT CHANGE UP (ref 96–108)
CHOLEST SERPL-MCNC: 114 MG/DL — SIGNIFICANT CHANGE UP (ref 10–199)
CK MB BLD-MCNC: 3.5 % — SIGNIFICANT CHANGE UP (ref 0–3.5)
CK MB CFR SERPL CALC: 3.9 NG/ML — SIGNIFICANT CHANGE UP (ref 0–6.7)
CK SERPL-CCNC: 113 U/L — SIGNIFICANT CHANGE UP (ref 30–200)
CO2 SERPL-SCNC: 25 MMOL/L — SIGNIFICANT CHANGE UP (ref 22–31)
CREAT SERPL-MCNC: 1.11 MG/DL — SIGNIFICANT CHANGE UP (ref 0.5–1.3)
GLUCOSE SERPL-MCNC: 75 MG/DL — SIGNIFICANT CHANGE UP (ref 70–99)
HBA1C BLD-MCNC: 5.3 % — SIGNIFICANT CHANGE UP (ref 4–5.6)
HCT VFR BLD CALC: 42 % — SIGNIFICANT CHANGE UP (ref 39–50)
HCV AB S/CO SERPL IA: 0.08 S/CO — SIGNIFICANT CHANGE UP (ref 0–0.79)
HCV AB SERPL-IMP: SIGNIFICANT CHANGE UP
HDLC SERPL-MCNC: 43 MG/DL — SIGNIFICANT CHANGE UP
HGB BLD-MCNC: 14.3 G/DL — SIGNIFICANT CHANGE UP (ref 13–17)
INR BLD: 2.27 RATIO — HIGH (ref 0.88–1.16)
LIPID PNL WITH DIRECT LDL SERPL: 64 MG/DL — SIGNIFICANT CHANGE UP
MAGNESIUM SERPL-MCNC: 2.1 MG/DL — SIGNIFICANT CHANGE UP (ref 1.6–2.6)
MCHC RBC-ENTMCNC: 29.5 PG — SIGNIFICANT CHANGE UP (ref 27–34)
MCHC RBC-ENTMCNC: 34.1 GM/DL — SIGNIFICANT CHANGE UP (ref 32–36)
MCV RBC AUTO: 86.5 FL — SIGNIFICANT CHANGE UP (ref 80–100)
PHOSPHATE SERPL-MCNC: 4 MG/DL — SIGNIFICANT CHANGE UP (ref 2.5–4.5)
PLATELET # BLD AUTO: 126 K/UL — LOW (ref 150–400)
POTASSIUM SERPL-MCNC: 3.8 MMOL/L — SIGNIFICANT CHANGE UP (ref 3.5–5.3)
POTASSIUM SERPL-SCNC: 3.8 MMOL/L — SIGNIFICANT CHANGE UP (ref 3.5–5.3)
PROT SERPL-MCNC: 5.9 G/DL — LOW (ref 6–8.3)
PROTHROM AB SERPL-ACNC: 26.8 SEC — HIGH (ref 10–12.9)
RBC # BLD: 4.85 M/UL — SIGNIFICANT CHANGE UP (ref 4.2–5.8)
RBC # FLD: 13.4 % — SIGNIFICANT CHANGE UP (ref 10.3–14.5)
SODIUM SERPL-SCNC: 138 MMOL/L — SIGNIFICANT CHANGE UP (ref 135–145)
T3 SERPL-MCNC: 101 NG/DL — SIGNIFICANT CHANGE UP (ref 80–200)
T4 AB SER-ACNC: 5.9 UG/DL — SIGNIFICANT CHANGE UP (ref 4.6–12)
TOTAL CHOLESTEROL/HDL RATIO MEASUREMENT: 2.7 RATIO — LOW (ref 3.4–9.6)
TRIGL SERPL-MCNC: 33 MG/DL — SIGNIFICANT CHANGE UP (ref 10–149)
TROPONIN T, HIGH SENSITIVITY RESULT: 73 NG/L — HIGH (ref 0–51)
WBC # BLD: 6.2 K/UL — SIGNIFICANT CHANGE UP (ref 3.8–10.5)
WBC # FLD AUTO: 6.2 K/UL — SIGNIFICANT CHANGE UP (ref 3.8–10.5)

## 2019-02-21 PROCEDURE — 99152 MOD SED SAME PHYS/QHP 5/>YRS: CPT | Mod: GC

## 2019-02-21 PROCEDURE — 93010 ELECTROCARDIOGRAM REPORT: CPT

## 2019-02-21 PROCEDURE — 99291 CRITICAL CARE FIRST HOUR: CPT

## 2019-02-21 PROCEDURE — 93454 CORONARY ARTERY ANGIO S&I: CPT | Mod: 26,GC

## 2019-02-21 RX ORDER — METOPROLOL TARTRATE 50 MG
12.5 TABLET ORAL EVERY 12 HOURS
Qty: 0 | Refills: 0 | Status: DISCONTINUED | OUTPATIENT
Start: 2019-02-21 | End: 2019-02-22

## 2019-02-21 RX ORDER — POTASSIUM CHLORIDE 20 MEQ
40 PACKET (EA) ORAL ONCE
Qty: 0 | Refills: 0 | Status: COMPLETED | OUTPATIENT
Start: 2019-02-21 | End: 2019-02-21

## 2019-02-21 RX ORDER — HEPARIN SODIUM 5000 [USP'U]/ML
3000 INJECTION INTRAVENOUS; SUBCUTANEOUS EVERY 6 HOURS
Qty: 0 | Refills: 0 | Status: DISCONTINUED | OUTPATIENT
Start: 2019-02-21 | End: 2019-02-21

## 2019-02-21 RX ORDER — HEPARIN SODIUM 5000 [USP'U]/ML
6500 INJECTION INTRAVENOUS; SUBCUTANEOUS EVERY 6 HOURS
Qty: 0 | Refills: 0 | Status: DISCONTINUED | OUTPATIENT
Start: 2019-02-21 | End: 2019-02-22

## 2019-02-21 RX ORDER — HEPARIN SODIUM 5000 [USP'U]/ML
INJECTION INTRAVENOUS; SUBCUTANEOUS
Qty: 25000 | Refills: 0 | Status: DISCONTINUED | OUTPATIENT
Start: 2019-02-21 | End: 2019-02-21

## 2019-02-21 RX ORDER — HEPARIN SODIUM 5000 [USP'U]/ML
6500 INJECTION INTRAVENOUS; SUBCUTANEOUS EVERY 6 HOURS
Qty: 0 | Refills: 0 | Status: DISCONTINUED | OUTPATIENT
Start: 2019-02-21 | End: 2019-02-21

## 2019-02-21 RX ORDER — HEPARIN SODIUM 5000 [USP'U]/ML
900 INJECTION INTRAVENOUS; SUBCUTANEOUS
Qty: 25000 | Refills: 0 | Status: DISCONTINUED | OUTPATIENT
Start: 2019-02-21 | End: 2019-02-22

## 2019-02-21 RX ORDER — HEPARIN SODIUM 5000 [USP'U]/ML
3000 INJECTION INTRAVENOUS; SUBCUTANEOUS EVERY 6 HOURS
Qty: 0 | Refills: 0 | Status: DISCONTINUED | OUTPATIENT
Start: 2019-02-21 | End: 2019-02-22

## 2019-02-21 RX ORDER — HEPARIN SODIUM 5000 [USP'U]/ML
950 INJECTION INTRAVENOUS; SUBCUTANEOUS
Qty: 25000 | Refills: 0 | Status: DISCONTINUED | OUTPATIENT
Start: 2019-02-21 | End: 2019-02-21

## 2019-02-21 RX ORDER — HEPARIN SODIUM 5000 [USP'U]/ML
1200 INJECTION INTRAVENOUS; SUBCUTANEOUS
Qty: 25000 | Refills: 0 | Status: DISCONTINUED | OUTPATIENT
Start: 2019-02-21 | End: 2019-02-21

## 2019-02-21 RX ADMIN — Medication 40 MILLIEQUIVALENT(S): at 06:12

## 2019-02-21 RX ADMIN — HEPARIN SODIUM 900 UNIT(S)/HR: 5000 INJECTION INTRAVENOUS; SUBCUTANEOUS at 19:31

## 2019-02-21 RX ADMIN — Medication 12.5 MILLIGRAM(S): at 21:48

## 2019-02-21 RX ADMIN — AMIODARONE HYDROCHLORIDE 400 MILLIGRAM(S): 400 TABLET ORAL at 05:40

## 2019-02-21 RX ADMIN — CHLORHEXIDINE GLUCONATE 1 APPLICATION(S): 213 SOLUTION TOPICAL at 06:00

## 2019-02-21 RX ADMIN — Medication 81 MILLIGRAM(S): at 09:53

## 2019-02-21 RX ADMIN — Medication 12.5 MILLIGRAM(S): at 09:53

## 2019-02-21 RX ADMIN — HEPARIN SODIUM 950 UNIT(S)/HR: 5000 INJECTION INTRAVENOUS; SUBCUTANEOUS at 11:05

## 2019-02-21 NOTE — PROGRESS NOTE ADULT - ASSESSMENT
64 yo M with PMH significant for AF on Eliquis, asthma who was tx from OSH after being found to have WCT in the setting of chest tightness and palpitations. He was treated with amiodarone 150mg IVP and placed on gtt.      #Ventricular tachycardia  EF 35-40%, per patient no history of depressed EF  - continue amiodarone 10g load  - awaiting C  - will need ICD for secondary prevention prior to discharge    #Atrial fibrillation/flutter  CHADSVASC 0. On home Bystolic and Eliquis. 64 yo M with PMH significant for AF on Eliquis, asthma who was tx from OSH after being found to have WCT in the setting of chest tightness and palpitations. He was treated with amiodarone 150mg IVP and placed on gtt.      #Atrial flutter with aberrancy  CHADSVASC 0. EKG with 1:1 flutter with aberrancy that slowed to 2:1 flutter after amiodarone. Now in sinus rhythm. EF 35-40%, per patient no history of depressed EF  - discontinue amiodarone  - start beta blocker and uptitrate as tolerated  - continue hep gtt  - awaiting C for ischemic eval for newly depressed EF  - will likely have ablation during inpatient stay

## 2019-02-21 NOTE — PROGRESS NOTE ADULT - SUBJECTIVE AND OBJECTIVE BOX
*** INCOMPLETE NOTE ***      PATIENT:  RACHEL SARAH  [id number]    CHIEF COMPLAINT:  Patient is a 63y old  Male who presents with a chief complaint of Sustained monomorphic VT (2019 20:20)      INTERVAL HISTORY/OVERNIGHT EVENTS:  No acute event overnight. ROS as below. No chest pain or SOB. NSR achieved. No VT ON.    REVIEW OF SYSTEMS:    Constitutional:     [X] negative [ ] fevers [ ] chills [ ] weight loss [ ] weight gain  HEENT:                  [X] negative [ ] dry eyes [ ] eye irritation [ ] postnasal drip [ ] nasal congestion  CV:                         [X] negative  [ ] chest pain [ ] orthopnea [ ] palpitations [ ] murmur  Resp:                     [X] negative [ ] cough [ ] shortness of breath [ ] dyspnea [ ] wheezing [ ] sputum [ ] hemoptysis  GI:                          [X] negative [ ] nausea [ ] vomiting [ ] diarrhea [ ] constipation [ ] abd pain [ ] dysphagia   :                        [X] negative [ ] dysuria [ ] nocturia [ ] hematuria [ ] increased urinary frequency  Musculoskeletal: [X] negative [ ] back pain [ ] myalgias [ ] arthralgias [ ] fracture  Skin:                       [X] negative [ ] rash [ ] itch  Neurological:        [X] negative [ ] headache [ ] dizziness [ ] syncope [ ] weakness [ ] numbness  Psychiatric:           [ ] negative [ ] anxiety [ ] depression  Endocrine:            [ ] negative [ ] diabetes [ ] thyroid problem  Heme/Lymph:      [ ] negative [ ] anemia [ ] bleeding problem  Allergic/Immune: [ ] negative [ ] itchy eyes [ ] nasal discharge [ ] hives [ ] angioedema    [ ] All other systems negative  [ ] Unable to assess ROS because ________.    MEDICATIONS:  MEDICATIONS  (STANDING):  amiodarone    Tablet 400 milliGRAM(s) Oral every 8 hours  aspirin enteric coated 81 milliGRAM(s) Oral daily  chlorhexidine 4% Liquid 1 Application(s) Topical <User Schedule>    MEDICATIONS  (PRN):      ALLERGIES:  Allergies    No Known Allergies    Intolerances        OBJECTIVE:  ICU Vital Signs Last 24 Hrs  T(C): 36.5 (2019 07:00), Max: 36.9 (2019 19:32)  T(F): 97.7 (2019 07:00), Max: 98.5 (2019 19:32)  HR: 66 (2019 07:00) (62 - 235)  BP: 119/78 (2019 07:00) (73/50 - 119/82)  BP(mean): 95 (2019 07:00) (73 - 97)  ABP: --  ABP(mean): --  RR: 14 (2019 07:00) (14 - 23)  SpO2: 97% (2019 07:00) (97% - 100%)      Adult Advanced Hemodynamics Last 24 Hrs  CVP(mm Hg): --  CVP(cm H2O): --  CO: --  CI: --  PA: --  PA(mean): --  PCWP: --  SVR: --  SVRI: --  PVR: --  PVRI: --  CAPILLARY BLOOD GLUCOSE        CAPILLARY BLOOD GLUCOSE        I&O's Summary    2019 07:01  -  2019 07:00  --------------------------------------------------------  IN: 903.1 mL / OUT: 1700 mL / NET: -796.9 mL      Daily Height in cm: 180.34 (2019 19:32)    Daily Weight in k (2019 06:00)    PHYSICAL EXAMINATION:  General: WN/WD NAD  HEENT: PERRLA, EOMI, moist mucous membranes  Neurology: A&Ox3, nonfocal, MARTÍNEZ x 4  Respiratory: CTA B/L, normal respiratory effort, no wheezes, crackles, rales  CV: RRR, S1S2, no murmurs, rubs or gallops  Abdominal: Soft, NT, ND +BS  Extremities: No edema, + peripheral pulses  Incisions:   Tubes:    LABS:                          14.3   6.2   )-----------( 126      ( 2019 05:22 )             42.0     02-    138  |  100  |  11  ----------------------------<  75  3.8   |  25  |  1.11    Ca    8.9      2019 05:22  Phos  4.0       Mg     2.1         TPro  5.9<L>  /  Alb  3.5  /  TBili  0.7  /  DBili  x   /  AST  79<H>  /  ALT  114<H>  /  AlkPhos  88      LIVER FUNCTIONS - ( 2019 05:22 )  Alb: 3.5 g/dL / Pro: 5.9 g/dL / ALK PHOS: 88 U/L / ALT: 114 U/L / AST: 79 U/L / GGT: x           PT/INR - ( 2019 05:22 )   PT: 26.8 sec;   INR: 2.27 ratio         PTT - ( 2019 05:22 )  PTT:88.7 sec  CKMB Units: 3.9 ng/mL ( @ 05:22)  Creatine Kinase, Serum: 113 U/L ( @ 05:22)  CKMB Units: 3.1 ng/mL ( @ 21:06)  Creatine Kinase, Serum: 116 U/L ( @ 21:06)  Creatine Kinase, Serum: 137 U/L ( @ 17:02)  CKMB Units: 1.9 ng/mL ( @ 17:02)    CARDIAC MARKERS ( 2019 05:22 )  x     / x     / 113 U/L / x     / 3.9 ng/mL  CARDIAC MARKERS ( 2019 21:06 )  x     / x     / 116 U/L / x     / 3.1 ng/mL  CARDIAC MARKERS ( 2019 17:02 )  <.015 ng/mL / x     / 137 U/L / x     / 1.9 ng/mL          TELEMETRY:     EKG:     IMAGING: Peter Lieberman MD, PhD | PGY-1  Department of Internal Medicine  Pager [phone] ([facility]) / [phone] ([facility])      PATIENT:  RACHEL SARAH  [id number]    CHIEF COMPLAINT:  Patient is a 63y old  Male who presents with a chief complaint of Sustained monomorphic VT (2019 20:20)      INTERVAL HISTORY/OVERNIGHT EVENTS:  No acute event overnight. ROS as below. No chest pain or SOB. NSR achieved. No VT ON.    REVIEW OF SYSTEMS:    Constitutional:     [X] negative [ ] fevers [ ] chills [ ] weight loss [ ] weight gain  HEENT:                  [X] negative [ ] dry eyes [ ] eye irritation [ ] postnasal drip [ ] nasal congestion  CV:                         [X] negative  [ ] chest pain [ ] orthopnea [ ] palpitations [ ] murmur  Resp:                     [X] negative [ ] cough [ ] shortness of breath [ ] dyspnea [ ] wheezing [ ] sputum [ ] hemoptysis  GI:                          [X] negative [ ] nausea [ ] vomiting [ ] diarrhea [ ] constipation [ ] abd pain [ ] dysphagia   :                        [X] negative [ ] dysuria [ ] nocturia [ ] hematuria [ ] increased urinary frequency  Musculoskeletal: [X] negative [ ] back pain [ ] myalgias [ ] arthralgias [ ] fracture  Skin:                       [X] negative [ ] rash [ ] itch  Neurological:        [X] negative [ ] headache [ ] dizziness [ ] syncope [ ] weakness [ ] numbness  Psychiatric:           [ ] negative [ ] anxiety [ ] depression  Endocrine:            [ ] negative [ ] diabetes [ ] thyroid problem  Heme/Lymph:      [ ] negative [ ] anemia [ ] bleeding problem  Allergic/Immune: [ ] negative [ ] itchy eyes [ ] nasal discharge [ ] hives [ ] angioedema    [ ] All other systems negative  [ ] Unable to assess ROS because ________.    MEDICATIONS:  MEDICATIONS  (STANDING):  amiodarone    Tablet 400 milliGRAM(s) Oral every 8 hours  aspirin enteric coated 81 milliGRAM(s) Oral daily  chlorhexidine 4% Liquid 1 Application(s) Topical <User Schedule>    MEDICATIONS  (PRN):      ALLERGIES:  Allergies    No Known Allergies    Intolerances        OBJECTIVE:  ICU Vital Signs Last 24 Hrs  T(C): 36.5 (2019 07:00), Max: 36.9 (2019 19:32)  T(F): 97.7 (2019 07:00), Max: 98.5 (2019 19:32)  HR: 66 (2019 07:00) (62 - 235)  BP: 119/78 (2019 07:00) (73/50 - 119/82)  BP(mean): 95 (2019 07:00) (73 - 97)  ABP: --  ABP(mean): --  RR: 14 (2019 07:00) (14 - 23)  SpO2: 97% (2019 07:00) (97% - 100%)      Adult Advanced Hemodynamics Last 24 Hrs  CVP(mm Hg): --  CVP(cm H2O): --  CO: --  CI: --  PA: --  PA(mean): --  PCWP: --  SVR: --  SVRI: --  PVR: --  PVRI: --  CAPILLARY BLOOD GLUCOSE        CAPILLARY BLOOD GLUCOSE        I&O's Summary    2019 07:01  -  2019 07:00  --------------------------------------------------------  IN: 903.1 mL / OUT: 1700 mL / NET: -796.9 mL      Daily Height in cm: 180.34 (2019 19:32)    Daily Weight in k (2019 06:00)    PHYSICAL EXAMINATION:  General: WN/WD NAD  HEENT: PERRLA, EOMI, moist mucous membranes  Neurology: A&Ox3, nonfocal, MARTÍNEZ x 4  Respiratory: CTA B/L, normal respiratory effort, no wheezes, crackles, rales  CV: RRR, S1S2, no murmurs, rubs or gallops  Abdominal: Soft, NT, ND +BS  Extremities: No edema, + peripheral pulses  Incisions:   Tubes:    LABS:                          14.3   6.2   )-----------( 126      ( 2019 05:22 )             42.0     02-21    138  |  100  |  11  ----------------------------<  75  3.8   |  25  |  1.11    Ca    8.9      2019 05:22  Phos  4.0       Mg     2.1         TPro  5.9<L>  /  Alb  3.5  /  TBili  0.7  /  DBili  x   /  AST  79<H>  /  ALT  114<H>  /  AlkPhos  88      LIVER FUNCTIONS - ( 2019 05:22 )  Alb: 3.5 g/dL / Pro: 5.9 g/dL / ALK PHOS: 88 U/L / ALT: 114 U/L / AST: 79 U/L / GGT: x           PT/INR - ( 2019 05:22 )   PT: 26.8 sec;   INR: 2.27 ratio         PTT - ( 2019 05:22 )  PTT:88.7 sec  CKMB Units: 3.9 ng/mL ( @ 05:22)  Creatine Kinase, Serum: 113 U/L ( @ 05:22)  CKMB Units: 3.1 ng/mL ( @ 21:06)  Creatine Kinase, Serum: 116 U/L ( @ 21:06)  Creatine Kinase, Serum: 137 U/L ( @ 17:02)  CKMB Units: 1.9 ng/mL ( @ 17:02)    CARDIAC MARKERS ( 2019 05:22 )  x     / x     / 113 U/L / x     / 3.9 ng/mL  CARDIAC MARKERS ( 2019 21:06 )  x     / x     / 116 U/L / x     / 3.1 ng/mL  CARDIAC MARKERS ( 2019 17:02 )  <.015 ng/mL / x     / 137 U/L / x     / 1.9 ng/mL          TELEMETRY:     EKG:     IMAGING:

## 2019-02-21 NOTE — PROGRESS NOTE ADULT - ASSESSMENT
63-yo M w/ PMH of asthma and 1-year history of Afib on Bystolic and Eliquis, presented to [facility] with palpitations and chest tightness, transferred for further workup.    #Neuro  - No issues at this time    #CV  - P/w palpitation and chest tightness a/w exertion in the setting of a-fib on Eliquis and Bystolic. CHADSVASC 0.  - EKG consistent with sustained monomorphic VT, broken with amio 150 IVP into a-flutter, subsequently to NSR  - TTE: EF 35-40%. Severe TR. Mod  RVE w/ dec RSVF.   - C/w ASA  - S/p heparin gtt, amiodarone gtt  - Will c/w amio 400 mg PO TID for 10-g load  - LHC per EP rec  - Daily EKG, keep on telemetry  - Keep K>4 and Mg>2    #Pulm  - No issues at this time    #Renal  - P/w SCr 1.63. Unknown baseline.  - Currently SCr 1.11. C/t monitor    #GI  - DASH diet  - Improving transaminitis    #Endo  - A1C, lipid panel WNL  - F/u TSH    #Heme  - No issues at this time    #DVT PPx  - Heparin gtt 63-yo M w/ PMH of asthma and 1-year history of Afib on Bystolic and Eliquis, presented to VS with palpitations and chest tightness, transferred for further workup.    #Neuro  - No issues at this time    #CV  - P/w palpitation and chest tightness a/w exertion in the setting of a-fib on Eliquis and Bystolic. CHADSVASC 0.  - EKG consistent with sustained monomorphic VT, broken with amio 150 IVP into a-flutter, subsequently to NSR  - TTE: EF 35-40%. Severe TR. Mod  RVE w/ dec RSVF.   - C/w ASA  - S/p heparin gtt, amiodarone gtt  - Will c/w amio 400 mg PO TID for 10-g load  - LHC per EP rec  - Daily EKG, keep on telemetry  - Keep K>4 and Mg>2  - Start metoprolol 12.5 mg PO BID    #Pulm  - No issues at this time    #Renal  - P/w SCr 1.63. Unknown baseline.  - Currently SCr 1.11. C/t monitor    #GI  - DASH diet  - Improving transaminitis    #Endo  - A1C, lipid panel WNL  - F/u TSH    #Heme  - No issues at this time    #DVT PPx  - HSQ 63-yo M w/ PMH of asthma and 1-year history of Afib on Bystolic and Eliquis, presented to VS with palpitations and chest tightness, transferred for further workup.    #Neuro  - No issues at this time    #CV  - P/w palpitation and chest tightness a/w exertion in the setting of a-fib on Eliquis and Bystolic. CHADSVASC 0.  - EKG consistent with sustained monomorphic VT, broken with amio 150 IVP into a-flutter, subsequently to NSR  - TTE: EF 35-40%. Severe TR. Mod MR. GARCES w/ dec RSVF.   - C/w ASA  - S/p heparin gtt, amiodarone gtt  - Will c/w amio 400 mg PO TID for 10-g load  - LHC per EP rec  - Daily EKG, keep on telemetry  - Keep K>4 and Mg>2  - Start metoprolol 12.5 mg PO BID  - Hold off ACEI for now    #Pulm  - No issues at this time    #Renal  - P/w SCr 1.63. Unknown baseline.  - Currently SCr 1.11. C/t monitor    #GI  - DASH diet  - Improving transaminitis    #Endo  - A1C, lipid panel WNL  - F/u TSH    #Heme  - No issues at this time    #DVT PPx  - HSQ 63-yo M w/ PMH of asthma and 1-year history of Afib on Bystolic and Eliquis, presented to VS with palpitations and chest tightness, transferred for further workup.    #Neuro  - No issues at this time    #CV  - P/w palpitation and chest tightness a/w exertion in the setting of a-fib on Eliquis and Bystolic. CHADSVASC 0.  - EKG consistent with sustained monomorphic VT, broken with amio 150 IVP into a-flutter, subsequently to NSR  - TTE: EF 35-40%. Severe TR. Mod  RVE w/ dec RSVF.   - C/w ASA  - S/p heparin gtt, amiodarone gtt  - Will c/w amio 400 mg PO TID for 10-g load  - LHC per EP rec  - Daily EKG, keep on telemetry  - Keep K>4 and Mg>2  - Start metoprolol 12.5 mg PO BID  - Hold off ACEI for now    #Pulm  - No issues at this time    #Renal  - P/w SCr 1.63. Unknown baseline.  - Currently SCr 1.11. C/t monitor    #GI  - DASH diet  - Improving transaminitis    #Endo  - A1C, lipid panel WNL  - F/u TSH    #Heme  - No issues at this time    #DVT PPx  - SCDs, pending intervention 63-yo M w/ PMH of asthma and 1-year history of Afib on Bystolic and Eliquis, presented to  with palpitations and chest tightness, transferred for further workup.    #Neuro  - No issues at this time    #CV  - P/w palpitation and chest tightness a/w exertion in the setting of a-fib on Eliquis and Bystolic. CHADSVASC 0.  - EKG consistent with sustained monomorphic VT, broken with amio 150 IVP into a-flutter, subsequently to NSR  - TTE: EF 35-40%. Severe TR. Mod  RVRAHUL w/ dec RSVF.   - C/w ASA  - S/p heparin gtt, amiodarone gtt  - On EKG review per EP, not VT. Will d/c amiodarone and start metoprolol 12.5 mg PO BID  - [facility] tomorrow  - Daily EKG, keep on telemetry  - Keep K>4 and Mg>2  - Hold off ACEI for now    #Pulm  - No issues at this time    #Renal  - P/w SCr 1.63. Unknown baseline.  - Currently SCr 1.11. C/t monitor    #GI  - DASH diet  - Improving transaminitis    #Endo  - A1C, lipid panel WNL  - F/u TSH    #Heme  - No issues at this time    #DVT PPx  - SCDs, pending intervention

## 2019-02-21 NOTE — PROGRESS NOTE ADULT - SUBJECTIVE AND OBJECTIVE BOX
Patient seen and examined at bedside.    Overnight Events: NAEO, NSR on telemetry.    Review Of Systems: No chest pain, shortness of breath, or palpitations            Current Meds:  amiodarone    Tablet 400 milliGRAM(s) Oral every 8 hours  aspirin enteric coated 81 milliGRAM(s) Oral daily  chlorhexidine 4% Liquid 1 Application(s) Topical <User Schedule>  heparin  Infusion. 950 Unit(s)/Hr IV Continuous <Continuous>  heparin  Injectable 6500 Unit(s) IV Push every 6 hours PRN  heparin  Injectable 3000 Unit(s) IV Push every 6 hours PRN  metoprolol tartrate 12.5 milliGRAM(s) Oral every 12 hours      Vitals:  T(F): 97.7 (), Max: 98.5 ()  HR: 77 () (62 - 235)  BP: 110/81 () (73/50 - 119/82)  RR: 18 ()  SpO2: 98% ()  I&O's Summary    2019 07:  -  2019 07:00  --------------------------------------------------------  IN: 903.1 mL / OUT: 1700 mL / NET: -796.9 mL    2019 07:  -  2019 10:55  --------------------------------------------------------  IN: 120 mL / OUT: 200 mL / NET: -80 mL        Physical Exam:  Appearance: No acute distress; well appearing  HEENT:  EOMI, sclera anicteric, Normal oral mucosa  Cardiovascular: RRR, S1, S2, no murmurs, rubs, or gallops; no edema; no JVD  Respiratory: Clear to auscultation bilaterally, no wheezes, rales, rhonchi  Gastrointestinal: soft, non-tender, non-distended with normal bowel sounds  Musculoskeletal: No clubbing; no joint deformity   Neurologic: Non-focal  Lymphatic: No lymphadenopathy  Psychiatry: AAOx3, mood & affect appropriate  Skin: No rashes, ecchymoses, or cyanosis                          14.3   6.2   )-----------( 126      ( 2019 05:22 )             42.0         138  |  100  |  11  ----------------------------<  75  3.8   |  25  |  1.11    Ca    8.9      2019 05:22  Phos  4.0       Mg     2.1         TPro  5.9<L>  /  Alb  3.5  /  TBili  0.7  /  DBili  x   /  AST  79<H>  /  ALT  114<H>  /  AlkPhos  88      PT/INR - ( 2019 05:22 )   PT: 26.8 sec;   INR: 2.27 ratio         PTT - ( 2019 05:22 )  PTT:88.7 sec  CARDIAC MARKERS ( 2019 05:22 )  73 ng/L / x     / x     / 113 U/L / x     / 3.9 ng/mL  CARDIAC MARKERS ( 2019 21:06 )  35 ng/L / x     / x     / 116 U/L / x     / 3.1 ng/mL  CARDIAC MARKERS ( 2019 17:02 )  x     / <.015 ng/mL / x     / 137 U/L / x     / 1.9 ng/mL      Serum Pro-Brain Natriuretic Peptide: 1967 pg/mL ( @ 21:06)    Total Cholesterol: 114  LDL: 64  HDL: 43  T    Hemoglobin A1C, Whole Blood: 5.3 % ( @ 05:20)      New ECG(s): Personally reviewed    Echo:  19  LVEF 35-40%  Conclusions:  1. Mitral annular calcification, otherwise normal mitral valve. Mild-moderate mitral regurgitation.  2. Normal left ventricular internal dimensions and wall thicknesses.  3. Moderate global left ventricular systolic dysfunction. Endocardial visualization enhanced with intravenous injection of Ultrasonic Enhancing Agent (Definity).  4. Moderate right atrial enlargement.  5. Right ventricular enlargement with decreased right ventricular systolic function.  6. Estimated right ventricular systolic pressure equals 33 mm Hg, assuming right atrial pressure equals 8 mm Hg, consistent with normal pulmonary pressures.  7. Normal tricuspid valve. Moderate-severe tricuspid regurgitation.  *** No previous Echo exam.    Imaging:    Interpretation of Telemetry: NSR 70s

## 2019-02-21 NOTE — CHART NOTE - NSCHARTNOTEFT_GEN_A_CORE
====================  CCU MIDNIGHT ROUNDS  ====================    ARCHEL SARAH  [id number]    ====================  SUMMARY:  ====================        ====================  NEW EVENTS:  ====================        ====================  VITALS (Last 12 hrs):  ====================    T(C): 36.7 (02-21-19 @ 19:00), Max: 36.7 (02-21-19 @ 19:00)  HR: 84 (02-21-19 @ 21:00) (66 - 90)  BP: 137/82 (02-21-19 @ 21:00) (116/85 - 145/95)  BP(mean): 98 (02-21-19 @ 21:00) (94 - 119)  ABP: --  ABP(mean): --  RR: 20 (02-21-19 @ 21:00) (14 - 25)  SpO2: 97% (02-21-19 @ 21:00) (97% - 100%)  Wt(kg): --  CVP(mm Hg): --  CO: --  CI: --  PA: --  PA(mean): --  PA(direct): --  PCWP: --  SVR: --    TELEMETRY:        *BLOOD GAS/ARTERIAL/MIXED/VENOUS  *LACTATE    I&O's Summary    20 Feb 2019 07:01  -  21 Feb 2019 07:00  --------------------------------------------------------  IN: 903.1 mL / OUT: 1700 mL / NET: -796.9 mL    21 Feb 2019 07:01  -  21 Feb 2019 22:01  --------------------------------------------------------  IN: 185.5 mL / OUT: 750 mL / NET: -564.5 mL        ====================  PLAN:  ====================    Briana NG-BC/CCU  spectra #[id number]  beeper #[id number] ====================  CCU MIDNIGHT ROUNDS  ====================    RACHEL KEARA  [id number]    ====================  SUMMARY:  ====================    63 M asthma, AF ([id number] 0, eliquis), w/ a flutter w/ aberrancy @ [facility], s/p IV amio > slower AF/flutter, tx [facility], EF 35-40%, clean cors     ====================  NEW EVENTS:  ====================    no events, no complaints. R wrist unremarkable.     ====================  VITALS (Last 12 hrs):  ====================    T(C): 36.7 (02-21-19 @ 19:00), Max: 36.7 (02-21-19 @ 19:00)  HR: 84 (02-21-19 @ 21:00) (66 - 90)  BP: 137/82 (02-21-19 @ 21:00) (116/85 - 145/95)  BP(mean): 98 (02-21-19 @ 21:00) (94 - 119)  RR: 20 (02-21-19 @ 21:00) (14 - 25)  SpO2: 97% (02-21-19 @ 21:00) (97% - 100%)    TELEMETRY: sinus     I&O's Summary    20 Feb 2019 07:01  -  21 Feb 2019 07:00  --------------------------------------------------------  IN: 903.1 mL / OUT: 1700 mL / NET: -796.9 mL    21 Feb 2019 07:01  -  21 Feb 2019 22:01  --------------------------------------------------------  IN: 185.5 mL / OUT: 750 mL / NET: -564.5 mL    ====================  PLAN:  ====================  - NPO at MN for EPS   - hep gtt, BB, K >4, Mg >2     Briana WORRELL-BC/CCU  spectra #[id number]  beeper #[id number]

## 2019-02-22 ENCOUNTER — TRANSCRIPTION ENCOUNTER (OUTPATIENT)
Age: 64
End: 2019-02-22

## 2019-02-22 VITALS — RESPIRATION RATE: 16 BRPM | HEART RATE: 74 BPM | DIASTOLIC BLOOD PRESSURE: 82 MMHG | SYSTOLIC BLOOD PRESSURE: 114 MMHG

## 2019-02-22 PROBLEM — I48.91 UNSPECIFIED ATRIAL FIBRILLATION: Chronic | Status: ACTIVE | Noted: 2019-02-20

## 2019-02-22 PROBLEM — J45.909 UNSPECIFIED ASTHMA, UNCOMPLICATED: Chronic | Status: ACTIVE | Noted: 2019-02-20

## 2019-02-22 LAB
ALBUMIN SERPL ELPH-MCNC: 3.6 G/DL — SIGNIFICANT CHANGE UP (ref 3.3–5)
ALP SERPL-CCNC: 83 U/L — SIGNIFICANT CHANGE UP (ref 40–120)
ALT FLD-CCNC: 97 U/L — HIGH (ref 10–45)
ANION GAP SERPL CALC-SCNC: 11 MMOL/L — SIGNIFICANT CHANGE UP (ref 5–17)
APTT BLD: 151.4 SEC — CRITICAL HIGH (ref 27.5–36.3)
AST SERPL-CCNC: 58 U/L — HIGH (ref 10–40)
BILIRUB SERPL-MCNC: 0.7 MG/DL — SIGNIFICANT CHANGE UP (ref 0.2–1.2)
BUN SERPL-MCNC: 12 MG/DL — SIGNIFICANT CHANGE UP (ref 7–23)
CALCIUM SERPL-MCNC: 9.3 MG/DL — SIGNIFICANT CHANGE UP (ref 8.4–10.5)
CHLORIDE SERPL-SCNC: 103 MMOL/L — SIGNIFICANT CHANGE UP (ref 96–108)
CO2 SERPL-SCNC: 25 MMOL/L — SIGNIFICANT CHANGE UP (ref 22–31)
CREAT SERPL-MCNC: 1.15 MG/DL — SIGNIFICANT CHANGE UP (ref 0.5–1.3)
GLUCOSE SERPL-MCNC: 75 MG/DL — SIGNIFICANT CHANGE UP (ref 70–99)
HCT VFR BLD CALC: 46.2 % — SIGNIFICANT CHANGE UP (ref 39–50)
HGB BLD-MCNC: 15.2 G/DL — SIGNIFICANT CHANGE UP (ref 13–17)
MAGNESIUM SERPL-MCNC: 1.8 MG/DL — SIGNIFICANT CHANGE UP (ref 1.6–2.6)
MCHC RBC-ENTMCNC: 28.8 PG — SIGNIFICANT CHANGE UP (ref 27–34)
MCHC RBC-ENTMCNC: 32.9 GM/DL — SIGNIFICANT CHANGE UP (ref 32–36)
MCV RBC AUTO: 87.6 FL — SIGNIFICANT CHANGE UP (ref 80–100)
PHOSPHATE SERPL-MCNC: 3 MG/DL — SIGNIFICANT CHANGE UP (ref 2.5–4.5)
PLATELET # BLD AUTO: 125 K/UL — LOW (ref 150–400)
POTASSIUM SERPL-MCNC: 4.3 MMOL/L — SIGNIFICANT CHANGE UP (ref 3.5–5.3)
POTASSIUM SERPL-SCNC: 4.3 MMOL/L — SIGNIFICANT CHANGE UP (ref 3.5–5.3)
PROT SERPL-MCNC: 6.2 G/DL — SIGNIFICANT CHANGE UP (ref 6–8.3)
RBC # BLD: 5.28 M/UL — SIGNIFICANT CHANGE UP (ref 4.2–5.8)
RBC # FLD: 13.9 % — SIGNIFICANT CHANGE UP (ref 10.3–14.5)
SODIUM SERPL-SCNC: 139 MMOL/L — SIGNIFICANT CHANGE UP (ref 135–145)
TSH SERPL-MCNC: 3.2 UIU/ML — SIGNIFICANT CHANGE UP (ref 0.27–4.2)
WBC # BLD: 5.3 K/UL — SIGNIFICANT CHANGE UP (ref 3.8–10.5)
WBC # FLD AUTO: 5.3 K/UL — SIGNIFICANT CHANGE UP (ref 3.8–10.5)

## 2019-02-22 PROCEDURE — 82553 CREATINE MB FRACTION: CPT

## 2019-02-22 PROCEDURE — 86803 HEPATITIS C AB TEST: CPT

## 2019-02-22 PROCEDURE — 84484 ASSAY OF TROPONIN QUANT: CPT

## 2019-02-22 PROCEDURE — C1769: CPT

## 2019-02-22 PROCEDURE — 86850 RBC ANTIBODY SCREEN: CPT

## 2019-02-22 PROCEDURE — 80061 LIPID PANEL: CPT

## 2019-02-22 PROCEDURE — 93454 CORONARY ARTERY ANGIO S&I: CPT

## 2019-02-22 PROCEDURE — 83735 ASSAY OF MAGNESIUM: CPT

## 2019-02-22 PROCEDURE — 80053 COMPREHEN METABOLIC PANEL: CPT

## 2019-02-22 PROCEDURE — 99152 MOD SED SAME PHYS/QHP 5/>YRS: CPT

## 2019-02-22 PROCEDURE — 93010 ELECTROCARDIOGRAM REPORT: CPT

## 2019-02-22 PROCEDURE — 83880 ASSAY OF NATRIURETIC PEPTIDE: CPT

## 2019-02-22 PROCEDURE — 84436 ASSAY OF TOTAL THYROXINE: CPT

## 2019-02-22 PROCEDURE — 85610 PROTHROMBIN TIME: CPT

## 2019-02-22 PROCEDURE — 83036 HEMOGLOBIN GLYCOSYLATED A1C: CPT

## 2019-02-22 PROCEDURE — 84100 ASSAY OF PHOSPHORUS: CPT

## 2019-02-22 PROCEDURE — C1894: CPT

## 2019-02-22 PROCEDURE — 99239 HOSP IP/OBS DSCHRG MGMT >30: CPT

## 2019-02-22 PROCEDURE — 85730 THROMBOPLASTIN TIME PARTIAL: CPT

## 2019-02-22 PROCEDURE — 86900 BLOOD TYPING SEROLOGIC ABO: CPT

## 2019-02-22 PROCEDURE — C1887: CPT

## 2019-02-22 PROCEDURE — 93005 ELECTROCARDIOGRAM TRACING: CPT

## 2019-02-22 PROCEDURE — 84443 ASSAY THYROID STIM HORMONE: CPT

## 2019-02-22 PROCEDURE — C8929: CPT

## 2019-02-22 PROCEDURE — 84480 ASSAY TRIIODOTHYRONINE (T3): CPT

## 2019-02-22 PROCEDURE — 82550 ASSAY OF CK (CPK): CPT

## 2019-02-22 PROCEDURE — 85027 COMPLETE CBC AUTOMATED: CPT

## 2019-02-22 PROCEDURE — 86901 BLOOD TYPING SEROLOGIC RH(D): CPT

## 2019-02-22 RX ORDER — APIXABAN 2.5 MG/1
1 TABLET, FILM COATED ORAL
Qty: 60 | Refills: 2
Start: 2019-02-22 | End: 2019-05-22

## 2019-02-22 RX ORDER — METOPROLOL TARTRATE 50 MG
0.5 TABLET ORAL
Qty: 30 | Refills: 2
Start: 2019-02-22 | End: 2019-05-22

## 2019-02-22 RX ORDER — APIXABAN 2.5 MG/1
1 TABLET, FILM COATED ORAL
Qty: 0 | Refills: 0 | COMMUNITY

## 2019-02-22 RX ORDER — AMIODARONE HYDROCHLORIDE 400 MG/1
400 TABLET ORAL ONCE
Qty: 0 | Refills: 0 | Status: COMPLETED | OUTPATIENT
Start: 2019-02-22 | End: 2019-02-22

## 2019-02-22 RX ORDER — AMIODARONE HYDROCHLORIDE 400 MG/1
150 TABLET ORAL ONCE
Qty: 0 | Refills: 0 | Status: COMPLETED | OUTPATIENT
Start: 2019-02-22 | End: 2019-02-22

## 2019-02-22 RX ORDER — AMIODARONE HYDROCHLORIDE 400 MG/1
1 TABLET ORAL
Qty: 30 | Refills: 0
Start: 2019-02-22 | End: 2019-03-23

## 2019-02-22 RX ORDER — LISINOPRIL 2.5 MG/1
2.5 TABLET ORAL DAILY
Qty: 0 | Refills: 0 | Status: DISCONTINUED | OUTPATIENT
Start: 2019-02-22 | End: 2019-02-22

## 2019-02-22 RX ORDER — AMIODARONE HYDROCHLORIDE 400 MG/1
2 TABLET ORAL
Qty: 28 | Refills: 0
Start: 2019-02-22 | End: 2019-02-28

## 2019-02-22 RX ORDER — MAGNESIUM SULFATE 500 MG/ML
2 VIAL (ML) INJECTION ONCE
Qty: 0 | Refills: 0 | Status: COMPLETED | OUTPATIENT
Start: 2019-02-22 | End: 2019-02-22

## 2019-02-22 RX ORDER — LISINOPRIL 2.5 MG/1
1 TABLET ORAL
Qty: 30 | Refills: 2
Start: 2019-02-22 | End: 2019-05-22

## 2019-02-22 RX ORDER — NEBIVOLOL HYDROCHLORIDE 5 MG/1
1 TABLET ORAL
Qty: 0 | Refills: 0 | COMMUNITY

## 2019-02-22 RX ORDER — ASPIRIN/CALCIUM CARB/MAGNESIUM 324 MG
1 TABLET ORAL
Qty: 30 | Refills: 2
Start: 2019-02-22 | End: 2019-05-22

## 2019-02-22 RX ORDER — AMIODARONE HYDROCHLORIDE 400 MG/1
400 TABLET ORAL
Qty: 0 | Refills: 0 | Status: DISCONTINUED | OUTPATIENT
Start: 2019-02-22 | End: 2019-02-22

## 2019-02-22 RX ORDER — APIXABAN 2.5 MG/1
5 TABLET, FILM COATED ORAL EVERY 12 HOURS
Qty: 0 | Refills: 0 | Status: DISCONTINUED | OUTPATIENT
Start: 2019-02-22 | End: 2019-02-22

## 2019-02-22 RX ADMIN — AMIODARONE HYDROCHLORIDE 600 MILLIGRAM(S): 400 TABLET ORAL at 09:28

## 2019-02-22 RX ADMIN — HEPARIN SODIUM 600 UNIT(S)/HR: 5000 INJECTION INTRAVENOUS; SUBCUTANEOUS at 03:33

## 2019-02-22 RX ADMIN — Medication 12.5 MILLIGRAM(S): at 09:31

## 2019-02-22 RX ADMIN — APIXABAN 5 MILLIGRAM(S): 2.5 TABLET, FILM COATED ORAL at 09:37

## 2019-02-22 RX ADMIN — Medication 50 GRAM(S): at 03:42

## 2019-02-22 RX ADMIN — AMIODARONE HYDROCHLORIDE 400 MILLIGRAM(S): 400 TABLET ORAL at 09:29

## 2019-02-22 RX ADMIN — Medication 81 MILLIGRAM(S): at 09:39

## 2019-02-22 RX ADMIN — HEPARIN SODIUM 0 UNIT(S)/HR: 5000 INJECTION INTRAVENOUS; SUBCUTANEOUS at 02:33

## 2019-02-22 NOTE — DISCHARGE NOTE ADULT - CARE PLAN
Principal Discharge DX:	Atrial fibrillation  Goal:	Please follow up with Dr. Domingo Wilson ([phone]) after 1-2 weeks of discharge.  Assessment and plan of treatment:	You were found to have atrial fibrillation with aberrancy during this hospitalization. Catheterization of your heart showed no cardiac vessel blockage. You were given heparin, amiodarone, and metoprolol. You were started on amiodarone. Please take amiodarone as prescribed. You are to take amiodarone 400 mg by mouth twice a day for 7 days, then 200 mg by mouth twice a day for the following 7 days, followed by 200 mg by mouth once a day. Please also take Eliquis 5 mg by mouth twice a day. Please follow up with Dr. Domingo Wilson within 1-2 weeks of discharge for ablation of atrial fibrillation (removal of focus in the heart causing atrial fibrillation). Principal Discharge DX:	Supraventricular tachycardia  Goal:	Please follow up with Dr. Domingo Wilson ([phone]) after 1-2 weeks of discharge.  Assessment and plan of treatment:	You were found to have supraventricular tachycardia with aberrancy (fast heart beat coming from the top part of the heart) during this hospitalization. Catheterization of your heart showed no cardiac vessel blockage. You were given heparin, amiodarone, and metoprolol. You were started on amiodarone. Please take amiodarone as prescribed. You are to take amiodarone 400 mg by mouth twice a day for 7 days, then 200 mg by mouth twice a day for the following 7 days, followed by 200 mg by mouth once a day. Please also take Eliquis 5 mg by mouth twice a day. Please follow up with Dr. Domingo Wilson within 1-2 weeks of discharge for ablation of atrial fibrillation (removal of focus in the heart causing atrial fibrillation). Principal Discharge DX:	Supraventricular tachycardia  Goal:	Please follow up with Dr. Domingo Wilson ([phone]) or Dr. Tay Hess ([phone]) after 1-2 weeks of discharge.  Assessment and plan of treatment:	You were found to have supraventricular tachycardia with aberrancy (fast heart beat coming from the top part of the heart) during this hospitalization. Catheterization of your heart showed no cardiac vessel blockage. You were given heparin, amiodarone, and metoprolol. Please take lisinopril 2.5 mg by mouth once a day. Also take metoprolol tartrate 12.5mg by mouth twice a day. Please take amiodarone as prescribed: You are to take amiodarone 400 mg by mouth TWICE a day for 14 DOSES (7 days), then 200 mg by mouth TWICE a day for the following 14 DOSES (7 days), followed by 200 mg by mouth ONCE a day. Please also take Eliquis 5 mg by mouth twice a day. Please follow up with Dr. Domingo Wilson or Dr. Tay Hess within 1-2 weeks of discharge for ablation of atrial fibrillation (removal of focus in the heart causing atrial fibrillation). Principal Discharge DX:	Supraventricular tachycardia  Goal:	Please follow up with Dr. Domingo Wilson ([phone]) on March 7th (Th) at 7:40 AM at [address] [place] [address].  Assessment and plan of treatment:	You were found to have supraventricular tachycardia with aberrancy (fast heart beat coming from the top part of the heart) during this hospitalization. Catheterization of your heart showed no cardiac vessel blockage. You were given heparin, amiodarone, and metoprolol. Please take lisinopril 2.5 mg by mouth once a day. Also take metoprolol tartrate 12.5mg by mouth twice a day. Please take amiodarone as prescribed: You are to take amiodarone 400 mg by mouth TWICE a day for 14 DOSES (7 days), then 200 mg by mouth TWICE a day for the following 14 DOSES (7 days), followed by 200 mg by mouth ONCE a day. Please also take Eliquis 5 mg by mouth twice a day. You are scheduled to see Dr. Domingo Wilson ([phone]; [address] [place] [address]) at March 7th (Th) at 7:40 AM for ablation of atrial fibrillation (removal of focus in the heart causing atrial fibrillation).

## 2019-02-22 NOTE — DISCHARGE NOTE ADULT - PROVIDER TOKENS
PROVIDER:[TOKEN:[[id number]]],PROVIDER:[TOKEN:[[id number]]] 26-Nov-2017 11:57 PROVIDER:[TOKEN:[[id number]]],PROVIDER:[TOKEN:[[id number]]],PROVIDER:[TOKEN:[[id number]]]

## 2019-02-22 NOTE — DISCHARGE NOTE ADULT - HOSPITAL COURSE
63-yo M w/ PMH of asthma and Afib on Bystolic and Eliquis x1y. Patient initially presented to [facility] with palpitations and chest tightness while walking up stairs. In [place] ED, T 98, -235, BP /50-69, RR 14-19, sat 98% on RA. Patient was sent to [facility] with findings of wide-QRS monomorphic tachycardia on amiodarone 150 mg IV,  mg, and heparin gtt. Patient's rhythm was converted to normal sinus. Amiodarone load was continued for possible VT. On arrival, TTE was performed to show 35-45% EF, severe TR, moderate MR, RV enlargement with decreased RV systolic function. Left heart catheterization was done and showed no vessel blockage. Patient was started on metoprolol. Overnight, patient developed atrial fibrillation with RVR. Patient was continued on amiodarone, which converted the rate to normal sinus again. Per EP recommendation, patient is to undergo elective atrial fibrillation ablation as an outpatient after discharge.    Patient was deemed medically stable and was discharged _____________________. 63-yo M w/ PMH of asthma and Afib on Bystolic and Eliquis x1y. Patient initially presented to [facility] with palpitations and chest tightness while walking up stairs. In [place] ED, T 98, -235, BP /50-69, RR 14-19, sat 98% on RA. Patient was sent to [facility] with findings of wide-QRS monomorphic tachycardia on amiodarone 150 mg IV,  mg, and heparin gtt. Patient's rhythm was converted to normal sinus. Amiodarone load was continued for possible VT. On arrival, TTE was performed to show 35-45% EF, severe TR, moderate MR, RV enlargement with decreased RV systolic function. Left heart catheterization was done and showed no vessel blockage. Patient was started on metoprolol. Overnight, patient developed atrial fibrillation with RVR. Patient was continued on amiodarone, which converted the rate to normal sinus again. Per EP recommendation, patient is to undergo elective atrial fibrillation ablation as an outpatient after discharge. Patient was deemed medically stable and was discharged home.

## 2019-02-22 NOTE — DISCHARGE NOTE ADULT - MEDICATION SUMMARY - MEDICATIONS TO TAKE
I will START or STAY ON the medications listed below when I get home from the hospital:    aspirin 81 mg oral delayed release tablet  -- 1 tab(s) by mouth once a day  -- Indication: For Heart health    lisinopril 2.5 mg oral tablet  -- 1 tab(s) by mouth once a day  -- Indication: For Heart health    amiodarone 200 mg oral tablet  -- 2 tab(s) by mouth 2 times a day for 14 doses (7 days: 2/22-3/1)  -- Indication: For Heart rhythm    amiodarone 200 mg oral tablet  -- 1 tab(s) by mouth 2 times a day for 14 doses (7 days: 3/2-3/8)  -- Indication: For Heart rhythm    amiodarone 200 mg oral tablet  -- 1 tab(s) by mouth once a day starting 3/9  -- Indication: For Heart rhythm    apixaban 5 mg oral tablet  -- 1 tab(s) by mouth every 12 hours  -- Indication: For Blood clot    metoprolol tartrate 25 mg oral tablet  -- 0.5 tab(s) by mouth 2 times a day   -- It is very important that you take or use this exactly as directed.  Do not skip doses or discontinue unless directed by your doctor.  May cause drowsiness.  Alcohol may intensify this effect.  Use care when operating dangerous machinery.  Some non-prescription drugs may aggravate your condition.  Read all labels carefully.  If a warning appears, check with your doctor before taking.  Take with food or milk.  This drug may impair the ability to drive or operate machinery.  Use care until you become familiar with its effects.    -- Indication: For Heart rhythm

## 2019-02-22 NOTE — PROGRESS NOTE ADULT - ASSESSMENT
63-yo M w/ PMH of asthma and 1-year history of Afib on Bystolic and Eliquis, presented to [facility] with palpitations and chest tightness, found to have a-fib with aberrancy, s/p cath, pending possible ablation.    #Neuro  - No issues at this time    #CV  - P/w palpitation and chest tightness a/w exertion in the setting of a-fib on Eliquis and Bystolic. CHADSVASC 0.  - EKG consistent with monomorphic wide-QRS tachycardia likely atrial fibrillation with aberrancy, broken with amio 150 IVP into a-flutter, subsequently to NSR  - TTE: EF 35-40%. Severe TR. Mod MR. RVE w/ dec RSVF.  - C/w ASA, metoprolol 12.5 mg PO BID. Amiodarone load stopped.  - S/p cath. No vessel blockages.  - Possible ablation today.  - Daily EKG, keep on telemetry  - Keep K>4 and Mg>2  - Hold off ACEI for now    #Pulm  - No issues at this time    #Renal  - P/w SCr 1.63, currently WNL. Continue to monitor.    #GI  - DASH diet  - P/w mild transaminitis, resolving.    #Endo  - A1C, lipid panel WNL  - F/u TSH    #Heme  - No issues at this time    #DVT PPx  - Heparin gtt. 63-yo M w/ PMH of asthma and 1-year history of Afib on Bystolic and Eliquis, presented to VS with palpitations and chest tightness, found to have a-fib with aberrancy, s/p cath, pending possible ablation.    #Neuro  - No issues at this time    #CV  - P/w palpitation and chest tightness a/w exertion in the setting of a-fib on Eliquis and Bystolic. CHADSVASC 0.  - EKG consistent with monomorphic wide-QRS tachycardia likely atrial fibrillation with aberrancy, broken with amio 150 IVP into a-flutter, subsequently to NSR  - TTE: EF 35-40%. Severe TR. Mod MR. RVE w/ dec RSVF.  - C/w ASA, metoprolol 12.5 mg PO BID. Amiodarone load stopped.  - S/p cath. No vessel blockages.  - EP study and ablation to be performed as outpatient.  - Resume amiodarone load x7d.  - Daily EKG, keep on telemetry  - Keep K>4 and Mg>2  - Hold off ACEI for now    #Pulm  - No issues at this time    #Renal  - P/w SCr 1.63, currently WNL. Continue to monitor.    #GI  - DASH diet  - P/w mild transaminitis, resolving.    #Endo  - A1C, lipid panel WNL  - F/u TSH    #Heme  - No issues at this time    #DVT PPx  - Heparin gtt. 63-yo M w/ PMH of asthma and 1-year history of Afib on Bystolic and Eliquis, presented to VS with palpitations and chest tightness, found to have a-fib with aberrancy, s/p cath, pending possible ablation.    #Neuro  - No issues at this time    #CV  - P/w palpitation and chest tightness a/w exertion in the setting of a-fib on Eliquis and Bystolic. CHADSVASC 0.  - EKG consistent with monomorphic wide-QRS tachycardia likely atrial fibrillation with aberrancy, broken with amio 150 IVP into a-flutter, subsequently to NSR  - TTE: EF 35-40%. Severe TR. Mod MR. RVE w/ dec RSVF.  - C/w ASA, metoprolol 12.5 mg PO BID. Amiodarone load stopped.  - S/p cath. No vessel blockages.  - EP study and ablation to be performed as outpatient.  - Resume amiodarone load x7d to complete 3/1.  - Daily EKG, keep on telemetry  - Keep K>4 and Mg>2  - Hold off ACEI for now    #Pulm  - No issues at this time    #Renal  - P/w SCr 1.63, currently WNL. Continue to monitor.    #GI  - DASH diet  - P/w mild transaminitis, resolving.    #Endo  - A1C, lipid panel WNL  - F/u TSH    #Heme  - No issues at this time    #DVT PPx  - Heparin gtt. 63-yo M w/ PMH of asthma and 1-year history of Afib on Bystolic and Eliquis, presented to VS with palpitations and chest tightness, found to have a-fib with aberrancy, s/p cath, pending possible ablation.    #Neuro  - No issues at this time    #CV  - P/w palpitation and chest tightness a/w exertion in the setting of a-fib on Eliquis and Bystolic. CHADSVASC 0.  - EKG consistent with monomorphic wide-QRS tachycardia likely atrial fibrillation with aberrancy, broken with amio 150 IVP into a-flutter, subsequently to NSR  - TTE: EF 35-40%. Severe TR. Mod MR. RVE w/ dec RSVF.  - C/w ASA, metoprolol 12.5 mg PO BID. Amiodarone load stopped.  - S/p cath. No vessel blockages.  - EP study and ablation to be performed as outpatient.  - Resume amiodarone load x7d to complete 3/1. Resume Eliquis 5 mg PO BID, and stop heparin gtt.  - Daily EKG, keep on telemetry  - Keep K>4 and Mg>2  - Hold off ACEI for now    #Pulm  - No issues at this time    #Renal  - P/w SCr 1.63, currently WNL. Continue to monitor.    #GI  - DASH diet  - P/w mild transaminitis, resolving.    #Endo  - A1C, lipid panel WNL  - F/u TSH    #Heme  - No issues at this time    #DVT PPx  - Eliquis 63-yo M w/ PMH of asthma and 1-year history of Afib on Bystolic and Eliquis, presented to VS with palpitations and chest tightness, found to have a-fib with aberrancy, s/p cath, pending possible ablation.    #Neuro  - No issues at this time    #CV  - P/w palpitation and chest tightness a/w exertion in the setting of a-fib on Eliquis and Bystolic. CHADSVASC 0.  - EKG consistent with monomorphic wide-QRS tachycardia likely atrial fibrillation with aberrancy, broken with amio 150 IVP into a-flutter, subsequently to NSR  - TTE: EF 35-40%. Severe TR. Mod MR. RVE w/ dec RSVF.  - C/w ASA, metoprolol 12.5 mg PO BID. Amiodarone load stopped.  - S/p cath. No vessel blockages.  - EP study and ablation to be performed as outpatient.  - Resume amiodarone load - 400 mg BID x1wk, 200 mg BID x1wk, then 200 mg QD. Resume Eliquis 5 mg PO BID, and stop heparin gtt.  - Daily EKG, keep on telemetry  - Keep K>4 and Mg>2  - Hold off ACEI for now    #Pulm  - No issues at this time    #Renal  - P/w SCr 1.63, currently WNL. Continue to monitor.    #GI  - DASH diet  - P/w mild transaminitis, resolving.    #Endo  - A1C, lipid panel WNL  - F/u TSH    #Heme  - No issues at this time    #DVT PPx  - Eliquis 63-yo M w/ PMH of asthma and 1-year history of Afib on Bystolic and Eliquis, presented to VS with palpitations and chest tightness, found to have a-fib with aberrancy, s/p cath, pending possible ablation.    #Neuro  - No issues at this time    #CV  - P/w palpitation and chest tightness a/w exertion in the setting of a-fib on Eliquis and Bystolic. CHADSVASC 0.  - EKG consistent with monomorphic wide-QRS tachycardia likely atrial flutter with aberrancy, broken with amio 150 IVP into atrial fibrillation, subsequently to NSR  - TTE: EF 35-40%. Severe TR. Mod MR. RVE w/ dec RSVF.  - C/w ASA, metoprolol 12.5 mg PO BID. Amiodarone load stopped.  - S/p cath. No vessel blockages.  - EP study and ablation to be performed as outpatient.  - Resume amiodarone load - 400 mg BID x1wk, 200 mg BID x1wk, then 200 mg QD. Resume Eliquis 5 mg PO BID, and stop heparin gtt.  - Daily EKG, keep on telemetry  - Keep K>4 and Mg>2  - Hold off ACEI for now    #Pulm  - No issues at this time    #Renal  - P/w SCr 1.63, currently WNL. Continue to monitor.    #GI  - DASH diet  - P/w mild transaminitis, resolving.    #Endo  - A1C, lipid panel WNL  - F/u TSH    #Heme  - No issues at this time    #DVT PPx  - Eliquis 63-yo M w/ PMH of asthma and 1-year history of Afib on Bystolic and Eliquis, presented to VS with palpitations and chest tightness, found to have SVT with aberrancy, s/p cath, pending possible ablation.    #Neuro  - No issues at this time    #CV  - P/w palpitation and chest tightness a/w exertion in the setting of a-fib on Eliquis and Bystolic. CHADSVASC 0.  - EKG consistent with monomorphic wide-QRS tachycardia likely SVT with aberrancy, broken with amio 150 IVP into atrial fibrillation, subsequently to NSR  - TTE: EF 35-40%. Severe TR. Mod MR. RVE w/ dec RSVF.  - C/w ASA, metoprolol 12.5 mg PO BID. Amiodarone load stopped.  - S/p cath. No vessel blockages.  - EP study and ablation to be performed as outpatient.  - Resume amiodarone load - 400 mg BID x1wk, 200 mg BID x1wk, then 200 mg QD. Resume Eliquis 5 mg PO BID, and stop heparin gtt.  - Daily EKG, keep on telemetry  - Keep K>4 and Mg>2  - Hold off ACEI for now    #Pulm  - No issues at this time    #Renal  - P/w SCr 1.63, currently WNL. Continue to monitor.    #GI  - DASH diet  - P/w mild transaminitis, resolving.    #Endo  - A1C, lipid panel WNL  - F/u TSH    #Heme  - No issues at this time    #DVT PPx  - Eliquis

## 2019-02-22 NOTE — DISCHARGE NOTE ADULT - CARE PROVIDERS DIRECT ADDRESSES
,DirectAddress_Unknown,[email or web address] ,DirectAddress_Unknown,[email or web address]

## 2019-02-22 NOTE — DISCHARGE NOTE ADULT - PATIENT PORTAL LINK FT
You can access the [employer] [facility] Patient Portal, offered by [facility], by registering with the following website: [email or web address]

## 2019-02-22 NOTE — PROGRESS NOTE ADULT - ASSESSMENT
64 yo M with PMH significant for AF on Eliquis, asthma who was tx from OSH after being found to have WCT in the setting of chest tightness and palpitations. He was treated with amiodarone 150mg IVP and placed on gtt.      #Atrial flutter with aberrancy and atrial fibrillation  CHADSVASC 0. EKG with 1:1 flutter with aberrancy that slowed to 2:1 flutter after amiodarone. Now in sinus rhythm. EF 35-40%, per patient no history of depressed EF  - given AF overnight, will plan for ablation as outpatient  - amio 150mg IVP then amio load 400mg BID x 14 doses, 400mg daily x 14 doses, 200mg daily  - switch to Eliquis 5mg PO BID

## 2019-02-22 NOTE — PROGRESS NOTE ADULT - ATTENDING COMMENTS
Patient presents with AFib with aberrancy that was organized into atrial flutter with rapid ventricular rates that were ultimately controlled and converted to sinus rhythm by amiodarone.    Will discontinue amiodarone at this time for pending EP study and ablation of atrial fibrillation/SVT in patient with reduced LVEF.    Continue anticoagulation.
Patient now maintaining NSR.  Discharge planning on antiarrhythmic therapy and anticoagulation per EP.  Follow-up as outpatient for ablation.

## 2019-02-22 NOTE — PROGRESS NOTE ADULT - SUBJECTIVE AND OBJECTIVE BOX
PATIENT:  RACHEL SARAH  [id number]    CHIEF COMPLAINT:  Patient is a 63y old  Male who presents with a chief complaint of Sustained monomorphic VT (2019 10:55)      INTERVAL HISTORY/OVERNIGHT EVENTS:  No overnight event reported by patient. ROS as below. PVCs on telemetry.    REVIEW OF SYSTEMS:    Constitutional:     [X] negative [ ] fevers [ ] chills [ ] weight loss [ ] weight gain  HEENT:                  [X] negative [ ] dry eyes [ ] eye irritation [ ] postnasal drip [ ] nasal congestion  CV:                         [X] negative  [ ] chest pain [ ] orthopnea [ ] palpitations [ ] murmur  Resp:                     [X] negative [ ] cough [ ] shortness of breath [ ] dyspnea [ ] wheezing [ ] sputum [ ] hemoptysis  GI:                          [X] negative [ ] nausea [ ] vomiting [ ] diarrhea [ ] constipation [ ] abd pain [ ] dysphagia   :                        [X] negative [ ] dysuria [ ] nocturia [ ] hematuria [ ] increased urinary frequency  Musculoskeletal: [X] negative [ ] back pain [ ] myalgias [ ] arthralgias [ ] fracture  Skin:                       [X] negative [ ] rash [ ] itch  Neurological:        [X] negative [ ] headache [ ] dizziness [ ] syncope [ ] weakness [ ] numbness  Psychiatric:           [ ] negative [ ] anxiety [ ] depression  Endocrine:            [ ] negative [ ] diabetes [ ] thyroid problem  Heme/Lymph:      [ ] negative [ ] anemia [ ] bleeding problem  Allergic/Immune: [ ] negative [ ] itchy eyes [ ] nasal discharge [ ] hives [ ] angioedema    [ ] All other systems negative  [ ] Unable to assess ROS because ________.    MEDICATIONS:  MEDICATIONS  (STANDING):  aspirin enteric coated 81 milliGRAM(s) Oral daily  chlorhexidine 4% Liquid 1 Application(s) Topical <User Schedule>  heparin  Infusion. 900 Unit(s)/Hr (9 mL/Hr) IV Continuous <Continuous>  metoprolol tartrate 12.5 milliGRAM(s) Oral every 12 hours    MEDICATIONS  (PRN):  heparin  Injectable 6500 Unit(s) IV Push every 6 hours PRN For aPTT less than 40  heparin  Injectable 3000 Unit(s) IV Push every 6 hours PRN For aPTT between 40 - 57      ALLERGIES:  Allergies    No Known Allergies    Intolerances        OBJECTIVE:  ICU Vital Signs Last 24 Hrs  T(C): 36.4 (2019 06:00), Max: 36.7 (2019 19:00)  T(F): 97.5 (2019 06:00), Max: 98 (2019 19:00)  HR: 72 (2019 07:00) (66 - 90)  BP: 125/85 (2019 07:00) (102/75 - 145/95)  BP(mean): 100 (2019 07:00) (68 - 119)  ABP: --  ABP(mean): --  RR: 12 (2019 07:00) (12 - 25)  SpO2: 98% (:00) (97% - 100%)      Adult Advanced Hemodynamics Last 24 Hrs  CVP(mm Hg): --  CVP(cm H2O): --  CO: --  CI: --  PA: --  PA(mean): --  PCWP: --  SVR: --  SVRI: --  PVR: --  PVRI: --  CAPILLARY BLOOD GLUCOSE        CAPILLARY BLOOD GLUCOSE        I&O's Summary    2019 07:01  -  2019 07:00  --------------------------------------------------------  IN: 429.5 mL / OUT: 1400 mL / NET: -970.5 mL      Daily     Daily Weight in k.8 (2019 05:00)    PHYSICAL EXAMINATION:  General: WN/WD NAD  HEENT: PERRLA, EOMI, moist mucous membranes  Neurology: A&Ox3, nonfocal, MARTÍNEZ x 4  Respiratory: CTA B/L, normal respiratory effort, no wheezes, crackles, rales  CV: RRR, S1S2, no murmurs, rubs or gallops  Abdominal: Soft, NT, ND +BS  Extremities: No edema, + peripheral pulses  Incisions:   Tubes:    LABS:                          15.2   5.3   )-----------( 125      ( 2019 01:37 )             46.2     02-    139  |  103  |  12  ----------------------------<  75  4.3   |  25  |  1.15    Ca    9.3      2019 01:37  Phos  3.0       Mg     1.8         TPro  6.2  /  Alb  3.6  /  TBili  0.7  /  DBili  x   /  AST  58<H>  /  ALT  97<H>  /  AlkPhos  83      LIVER FUNCTIONS - ( 2019 01:37 )  Alb: 3.6 g/dL / Pro: 6.2 g/dL / ALK PHOS: 83 U/L / ALT: 97 U/L / AST: 58 U/L / GGT: x           PT/INR - ( 2019 05:22 )   PT: 26.8 sec;   INR: 2.27 ratio         PTT - ( 2019 01:37 )  PTT:151.4 sec    CARDIAC MARKERS ( 2019 05:22 )  x     / x     / 113 U/L / x     / 3.9 ng/mL  CARDIAC MARKERS ( 2019 21:06 )  x     / x     / 116 U/L / x     / 3.1 ng/mL  CARDIAC MARKERS ( 2019 17:02 )  <.015 ng/mL / x     / 137 U/L / x     / 1.9 ng/mL          TELEMETRY:     EKG:     IMAGING: Peter Lieberman MD, PhD | PGY-1  Department of Internal Medicine  Pager [phone] ([facility]) / [phone] ([place])      PATIENT:  RACHEL SARAH  [id number]    CHIEF COMPLAINT:  Patient is a 63y old  Male who presents with a chief complaint of Sustained monomorphic VT (2019 10:55)      INTERVAL HISTORY/OVERNIGHT EVENTS:  No overnight event reported by patient. ROS as below. PVCs on telemetry.    REVIEW OF SYSTEMS:    Constitutional:     [X] negative [ ] fevers [ ] chills [ ] weight loss [ ] weight gain  HEENT:                  [X] negative [ ] dry eyes [ ] eye irritation [ ] postnasal drip [ ] nasal congestion  CV:                         [X] negative  [ ] chest pain [ ] orthopnea [ ] palpitations [ ] murmur  Resp:                     [X] negative [ ] cough [ ] shortness of breath [ ] dyspnea [ ] wheezing [ ] sputum [ ] hemoptysis  GI:                          [X] negative [ ] nausea [ ] vomiting [ ] diarrhea [ ] constipation [ ] abd pain [ ] dysphagia   :                        [X] negative [ ] dysuria [ ] nocturia [ ] hematuria [ ] increased urinary frequency  Musculoskeletal: [X] negative [ ] back pain [ ] myalgias [ ] arthralgias [ ] fracture  Skin:                       [X] negative [ ] rash [ ] itch  Neurological:        [X] negative [ ] headache [ ] dizziness [ ] syncope [ ] weakness [ ] numbness  Psychiatric:           [ ] negative [ ] anxiety [ ] depression  Endocrine:            [ ] negative [ ] diabetes [ ] thyroid problem  Heme/Lymph:      [ ] negative [ ] anemia [ ] bleeding problem  Allergic/Immune: [ ] negative [ ] itchy eyes [ ] nasal discharge [ ] hives [ ] angioedema    [ ] All other systems negative  [ ] Unable to assess ROS because ________.    MEDICATIONS:  MEDICATIONS  (STANDING):  aspirin enteric coated 81 milliGRAM(s) Oral daily  chlorhexidine 4% Liquid 1 Application(s) Topical <User Schedule>  heparin  Infusion. 900 Unit(s)/Hr (9 mL/Hr) IV Continuous <Continuous>  metoprolol tartrate 12.5 milliGRAM(s) Oral every 12 hours    MEDICATIONS  (PRN):  heparin  Injectable 6500 Unit(s) IV Push every 6 hours PRN For aPTT less than 40  heparin  Injectable 3000 Unit(s) IV Push every 6 hours PRN For aPTT between 40 - 57      ALLERGIES:  Allergies    No Known Allergies    Intolerances        OBJECTIVE:  ICU Vital Signs Last 24 Hrs  T(C): 36.4 (2019 06:00), Max: 36.7 (2019 19:00)  T(F): 97.5 (2019 06:00), Max: 98 (2019 19:00)  HR: 72 (2019 07:00) (66 - 90)  BP: 125/85 (2019 07:00) (102/75 - 145/95)  BP(mean): 100 (2019 07:00) (68 - 119)  ABP: --  ABP(mean): --  RR: 12 (2019 07:00) (12 - 25)  SpO2: 98% (:00) (97% - 100%)      Adult Advanced Hemodynamics Last 24 Hrs  CVP(mm Hg): --  CVP(cm H2O): --  CO: --  CI: --  PA: --  PA(mean): --  PCWP: --  SVR: --  SVRI: --  PVR: --  PVRI: --  CAPILLARY BLOOD GLUCOSE        CAPILLARY BLOOD GLUCOSE        I&O's Summary    2019 07:01  -  2019 07:00  --------------------------------------------------------  IN: 429.5 mL / OUT: 1400 mL / NET: -970.5 mL      Daily     Daily Weight in k.8 (2019 05:00)    PHYSICAL EXAMINATION:  General: WN/WD NAD  HEENT: PERRLA, EOMI, moist mucous membranes  Neurology: A&Ox3, nonfocal, MARTÍNEZ x 4  Respiratory: CTA B/L, normal respiratory effort, no wheezes, crackles, rales  CV: RRR, S1S2, no murmurs, rubs or gallops  Abdominal: Soft, NT, ND +BS  Extremities: No edema, + peripheral pulses; R distal arm with clean, intact bandage, intact and nonerythematous  Incisions:   Tubes:    LABS:                          15.2   5.3   )-----------( 125      ( 2019 01:37 )             46.2         139  |  103  |  12  ----------------------------<  75  4.3   |  25  |  1.15    Ca    9.3      2019 01:37  Phos  3.0       Mg     1.8         TPro  6.2  /  Alb  3.6  /  TBili  0.7  /  DBili  x   /  AST  58<H>  /  ALT  97<H>  /  AlkPhos  83      LIVER FUNCTIONS - ( 2019 01:37 )  Alb: 3.6 g/dL / Pro: 6.2 g/dL / ALK PHOS: 83 U/L / ALT: 97 U/L / AST: 58 U/L / GGT: x           PT/INR - ( 2019 05:22 )   PT: 26.8 sec;   INR: 2.27 ratio         PTT - ( 2019 01:37 )  PTT:151.4 sec    CARDIAC MARKERS ( 2019 05:22 )  x     / x     / 113 U/L / x     / 3.9 ng/mL  CARDIAC MARKERS ( 2019 21:06 )  x     / x     / 116 U/L / x     / 3.1 ng/mL  CARDIAC MARKERS ( 2019 17:02 )  <.015 ng/mL / x     / 137 U/L / x     / 1.9 ng/mL          TELEMETRY:     EKG:     IMAGING: Peter Lieberman MD, PhD | PGY-1  Department of Internal Medicine  Pager [phone] ([facility]) / [phone] ([place])      PATIENT:  RACHEL SARAH  [id number]    CHIEF COMPLAINT:  Patient is a 63y old  Male who presents with a chief complaint of Sustained monomorphic VT (2019 10:55)      INTERVAL HISTORY/OVERNIGHT EVENTS:  No overnight event reported by patient. ROS as below. PVCs and irregular rhythms on tele.    REVIEW OF SYSTEMS:    Constitutional:     [X] negative [ ] fevers [ ] chills [ ] weight loss [ ] weight gain  HEENT:                  [X] negative [ ] dry eyes [ ] eye irritation [ ] postnasal drip [ ] nasal congestion  CV:                         [X] negative  [ ] chest pain [ ] orthopnea [ ] palpitations [ ] murmur  Resp:                     [X] negative [ ] cough [ ] shortness of breath [ ] dyspnea [ ] wheezing [ ] sputum [ ] hemoptysis  GI:                          [X] negative [ ] nausea [ ] vomiting [ ] diarrhea [ ] constipation [ ] abd pain [ ] dysphagia   :                        [X] negative [ ] dysuria [ ] nocturia [ ] hematuria [ ] increased urinary frequency  Musculoskeletal: [X] negative [ ] back pain [ ] myalgias [ ] arthralgias [ ] fracture  Skin:                       [X] negative [ ] rash [ ] itch  Neurological:        [X] negative [ ] headache [ ] dizziness [ ] syncope [ ] weakness [ ] numbness  Psychiatric:           [ ] negative [ ] anxiety [ ] depression  Endocrine:            [ ] negative [ ] diabetes [ ] thyroid problem  Heme/Lymph:      [ ] negative [ ] anemia [ ] bleeding problem  Allergic/Immune: [ ] negative [ ] itchy eyes [ ] nasal discharge [ ] hives [ ] angioedema    [ ] All other systems negative  [ ] Unable to assess ROS because ________.    MEDICATIONS:  MEDICATIONS  (STANDING):  aspirin enteric coated 81 milliGRAM(s) Oral daily  chlorhexidine 4% Liquid 1 Application(s) Topical <User Schedule>  heparin  Infusion. 900 Unit(s)/Hr (9 mL/Hr) IV Continuous <Continuous>  metoprolol tartrate 12.5 milliGRAM(s) Oral every 12 hours    MEDICATIONS  (PRN):  heparin  Injectable 6500 Unit(s) IV Push every 6 hours PRN For aPTT less than 40  heparin  Injectable 3000 Unit(s) IV Push every 6 hours PRN For aPTT between 40 - 57      ALLERGIES:  Allergies    No Known Allergies    Intolerances        OBJECTIVE:  ICU Vital Signs Last 24 Hrs  T(C): 36.4 (2019 06:00), Max: 36.7 (2019 19:00)  T(F): 97.5 (2019 06:00), Max: 98 (2019 19:00)  HR: 72 (2019 07:00) (66 - 90)  BP: 125/85 (2019 07:00) (102/75 - 145/95)  BP(mean): 100 (2019 07:00) (68 - 119)  ABP: --  ABP(mean): --  RR: 12 (2019 07:00) (12 - 25)  SpO2: 98% (:00) (97% - 100%)      Adult Advanced Hemodynamics Last 24 Hrs  CVP(mm Hg): --  CVP(cm H2O): --  CO: --  CI: --  PA: --  PA(mean): --  PCWP: --  SVR: --  SVRI: --  PVR: --  PVRI: --  CAPILLARY BLOOD GLUCOSE        CAPILLARY BLOOD GLUCOSE        I&O's Summary    2019 07:01  -  2019 07:00  --------------------------------------------------------  IN: 429.5 mL / OUT: 1400 mL / NET: -970.5 mL      Daily     Daily Weight in k.8 (2019 05:00)    PHYSICAL EXAMINATION:  General: WN/WD NAD  HEENT: PERRLA, EOMI, moist mucous membranes  Neurology: A&Ox3, nonfocal, MARTÍNEZ x 4  Respiratory: CTA B/L, normal respiratory effort, no wheezes, crackles, rales  CV: RRR, S1S2, no murmurs, rubs or gallops  Abdominal: Soft, NT, ND +BS  Extremities: No edema, + peripheral pulses; R distal arm with clean, intact bandage, intact and nonerythematous  Incisions:   Tubes:    LABS:                          15.2   5.3   )-----------( 125      ( 2019 01:37 )             46.2         139  |  103  |  12  ----------------------------<  75  4.3   |  25  |  1.15    Ca    9.3      2019 01:37  Phos  3.0       Mg     1.8         TPro  6.2  /  Alb  3.6  /  TBili  0.7  /  DBili  x   /  AST  58<H>  /  ALT  97<H>  /  AlkPhos  83      LIVER FUNCTIONS - ( 2019 01:37 )  Alb: 3.6 g/dL / Pro: 6.2 g/dL / ALK PHOS: 83 U/L / ALT: 97 U/L / AST: 58 U/L / GGT: x           PT/INR - ( 2019 05:22 )   PT: 26.8 sec;   INR: 2.27 ratio         PTT - ( 2019 01:37 )  PTT:151.4 sec    CARDIAC MARKERS ( 2019 05:22 )  x     / x     / 113 U/L / x     / 3.9 ng/mL  CARDIAC MARKERS ( 2019 21:06 )  x     / x     / 116 U/L / x     / 3.1 ng/mL  CARDIAC MARKERS ( 2019 17:02 )  <.015 ng/mL / x     / 137 U/L / x     / 1.9 ng/mL          TELEMETRY:     EKG:     IMAGING:

## 2019-02-22 NOTE — DISCHARGE NOTE ADULT - MEDICATION SUMMARY - MEDICATIONS TO STOP TAKING
I will STOP taking the medications listed below when I get home from the hospital:    Bystolic 5 mg oral tablet  -- 1 tab(s) by mouth once a day

## 2019-02-22 NOTE — PROGRESS NOTE ADULT - SUBJECTIVE AND OBJECTIVE BOX
Patient seen and examined at bedside.    Overnight Events:     Review Of Systems: No chest pain, shortness of breath, or palpitations            Current Meds:  amiodarone    Tablet 400 milliGRAM(s) Oral two times a day  apixaban 5 milliGRAM(s) Oral every 12 hours  aspirin enteric coated 81 milliGRAM(s) Oral daily  chlorhexidine 4% Liquid 1 Application(s) Topical <User Schedule>  metoprolol tartrate 12.5 milliGRAM(s) Oral every 12 hours      Vitals:  T(F): 97.7 (02-22), Max: 98 (02-21)  HR: 78 (02-22) (66 - 130)  BP: 112/76 (02-22) (102/75 - 145/95)  RR: 18 (02-22)  SpO2: 98% (02-22)  I&O's Summary    21 Feb 2019 07:01  -  22 Feb 2019 07:00  --------------------------------------------------------  IN: 429.5 mL / OUT: 1400 mL / NET: -970.5 mL    22 Feb 2019 07:01  -  22 Feb 2019 11:38  --------------------------------------------------------  IN: 112 mL / OUT: 0 mL / NET: 112 mL        Physical Exam:  Appearance: No acute distress; well appearing  HEENT:  EOMI, sclera anicteric, Normal oral mucosa  Cardiovascular: RRR, S1, S2, no murmurs, rubs, or gallops; no edema; no JVD  Respiratory: Clear to auscultation bilaterally, no wheezes, rales, rhonchi  Gastrointestinal: soft, non-tender, non-distended with normal bowel sounds  Musculoskeletal: No clubbing; no joint deformity   Neurologic: Non-focal  Lymphatic: No lymphadenopathy  Psychiatry: AAOx3, mood & affect appropriate  Skin: No rashes, ecchymoses, or cyanosis                          15.2   5.3   )-----------( 125      ( 22 Feb 2019 01:37 )             46.2     02-22    139  |  103  |  12  ----------------------------<  75  4.3   |  25  |  1.15    Ca    9.3      22 Feb 2019 01:37  Phos  3.0     02-22  Mg     1.8     02-22    TPro  6.2  /  Alb  3.6  /  TBili  0.7  /  DBili  x   /  AST  58<H>  /  ALT  97<H>  /  AlkPhos  83  02-22    PT/INR - ( 21 Feb 2019 05:22 )   PT: 26.8 sec;   INR: 2.27 ratio         PTT - ( 22 Feb 2019 01:37 )  PTT:151.4 sec  CARDIAC MARKERS ( 21 Feb 2019 05:22 )  73 ng/L / x     / x     / 113 U/L / x     / 3.9 ng/mL  CARDIAC MARKERS ( 20 Feb 2019 21:06 )  35 ng/L / x     / x     / 116 U/L / x     / 3.1 ng/mL  CARDIAC MARKERS ( 20 Feb 2019 17:02 )  x     / <.015 ng/mL / x     / 137 U/L / x     / 1.9 ng/mL      Serum Pro-Brain Natriuretic Peptide: 1967 pg/mL (02-20 @ 21:06)      New ECG(s): Personally reviewed    Echo:  2/20/19  Conclusions:  1. Mitral annular calcification, otherwise normal mitral valve. Mild-moderate mitral regurgitation.  2. Normal left ventricular internal dimensions and wall thicknesses.  3. Moderate global left ventricular systolic dysfunction. Endocardial visualization enhanced with intravenous injection of Ultrasonic Enhancing Agent (Definity).  4. Moderate right atrial enlargement.  5. Right ventricular enlargement with decreased right ventricular systolic function.  6. Estimated right ventricular systolic pressure equals 33 mm Hg, assuming right atrial pressure equals 8 mm Hg, consistent with normal pulmonary pressures.  7. Normal tricuspid valve. Moderate-severe tricuspid regurgitation.  *** No previous Echo exam.    Interpretation of Telemetry: atrial fibrillation

## 2019-02-22 NOTE — DISCHARGE NOTE ADULT - CARE PROVIDER_API CALL
Jatinder Ha)  Cardiovascular Disease; Sleep Medicine  [address] [place] [address]  Phone: [phone]  Fax: [phone]  Follow Up Time:     Domingo Wilson)  Cardiac Electrophysiology; Cardiology; Internal Medicine  [address] [place] [address]  Phone: [phone]  Follow Up Time: Jatinder Ha)  Cardiovascular Disease; Sleep Medicine  [address] [place] [address]  Phone: [phone]  Fax: [phone]  Follow Up Time:     Domingo Wilson)  Cardiac Electrophysiology; Cardiology; Internal Medicine  [address] [place] [address]  Phone: [phone]  Follow Up Time:     Manuela Hess)  Cardiac Electrophysiology; Cardiovascular Disease; Internal Medicine  [address] [place] [address]  Phone: [phone]  Fax: [phone]  Follow Up Time:

## 2019-02-22 NOTE — DISCHARGE NOTE ADULT - PLAN OF CARE
Please follow up with Dr. Domingo Wilson ([phone]) after 1-2 weeks of discharge. You were found to have atrial fibrillation with aberrancy during this hospitalization. Catheterization of your heart showed no cardiac vessel blockage. You were given heparin, amiodarone, and metoprolol. You were started on amiodarone. Please take amiodarone as prescribed. You are to take amiodarone 400 mg by mouth twice a day for 7 days, then 200 mg by mouth twice a day for the following 7 days, followed by 200 mg by mouth once a day. Please also take Eliquis 5 mg by mouth twice a day. Please follow up with Dr. Domingo Wilson within 1-2 weeks of discharge for ablation of atrial fibrillation (removal of focus in the heart causing atrial fibrillation). You were found to have supraventricular tachycardia with aberrancy (fast heart beat coming from the top part of the heart) during this hospitalization. Catheterization of your heart showed no cardiac vessel blockage. You were given heparin, amiodarone, and metoprolol. You were started on amiodarone. Please take amiodarone as prescribed. You are to take amiodarone 400 mg by mouth twice a day for 7 days, then 200 mg by mouth twice a day for the following 7 days, followed by 200 mg by mouth once a day. Please also take Eliquis 5 mg by mouth twice a day. Please follow up with Dr. Domingo Wilson within 1-2 weeks of discharge for ablation of atrial fibrillation (removal of focus in the heart causing atrial fibrillation). Please follow up with Dr. Domingo Wilson ([phone]) or Dr. Tay Hess ([phone]) after 1-2 weeks of discharge. You were found to have supraventricular tachycardia with aberrancy (fast heart beat coming from the top part of the heart) during this hospitalization. Catheterization of your heart showed no cardiac vessel blockage. You were given heparin, amiodarone, and metoprolol. Please take lisinopril 2.5 mg by mouth once a day. Also take metoprolol tartrate 12.5mg by mouth twice a day. Please take amiodarone as prescribed: You are to take amiodarone 400 mg by mouth TWICE a day for 14 DOSES (7 days), then 200 mg by mouth TWICE a day for the following 14 DOSES (7 days), followed by 200 mg by mouth ONCE a day. Please also take Eliquis 5 mg by mouth twice a day. Please follow up with Dr. Domingo Wilson or Dr. Tay Hess within 1-2 weeks of discharge for ablation of atrial fibrillation (removal of focus in the heart causing atrial fibrillation). Please follow up with Dr. Domingo Wilson ([phone]) on March 7th (Th) at 7:40 AM at [address] [place] [address]. You were found to have supraventricular tachycardia with aberrancy (fast heart beat coming from the top part of the heart) during this hospitalization. Catheterization of your heart showed no cardiac vessel blockage. You were given heparin, amiodarone, and metoprolol. Please take lisinopril 2.5 mg by mouth once a day. Also take metoprolol tartrate 12.5mg by mouth twice a day. Please take amiodarone as prescribed: You are to take amiodarone 400 mg by mouth TWICE a day for 14 DOSES (7 days), then 200 mg by mouth TWICE a day for the following 14 DOSES (7 days), followed by 200 mg by mouth ONCE a day. Please also take Eliquis 5 mg by mouth twice a day. You are scheduled to see Dr. Domingo Wilson ([phone]; [address] [place] [address]) at March 7th (Th) at 7:40 AM for ablation of atrial fibrillation (removal of focus in the heart causing atrial fibrillation).

## 2019-03-02 RX ORDER — AMIODARONE HYDROCHLORIDE 400 MG/1
1 TABLET ORAL
Qty: 14 | Refills: 0
Start: 2019-03-02 | End: 2019-03-08

## 2019-03-07 ENCOUNTER — APPOINTMENT (OUTPATIENT)
Dept: ELECTROPHYSIOLOGY | Facility: CLINIC | Age: 64
End: 2019-03-07
Payer: COMMERCIAL

## 2019-03-07 VITALS
SYSTOLIC BLOOD PRESSURE: 151 MMHG | OXYGEN SATURATION: 100 % | HEIGHT: 71 IN | DIASTOLIC BLOOD PRESSURE: 94 MMHG | HEART RATE: 65 BPM | BODY MASS INDEX: 23.52 KG/M2 | WEIGHT: 168 LBS

## 2019-03-07 DIAGNOSIS — I48.0 PAROXYSMAL ATRIAL FIBRILLATION: ICD-10-CM

## 2019-03-07 DIAGNOSIS — J45.909 UNSPECIFIED ASTHMA, UNCOMPLICATED: ICD-10-CM

## 2019-03-07 DIAGNOSIS — Z78.9 OTHER SPECIFIED HEALTH STATUS: ICD-10-CM

## 2019-03-07 DIAGNOSIS — Z87.09 PERSONAL HISTORY OF OTHER DISEASES OF THE RESPIRATORY SYSTEM: ICD-10-CM

## 2019-03-07 PROCEDURE — 99214 OFFICE O/P EST MOD 30 MIN: CPT

## 2019-03-07 PROCEDURE — 93000 ELECTROCARDIOGRAM COMPLETE: CPT

## 2019-03-07 NOTE — PHYSICAL EXAM
[General Appearance - Well Developed] : well developed [Normal Appearance] : normal appearance [Well Groomed] : well groomed [General Appearance - Well Nourished] : well nourished [No Deformities] : no deformities [General Appearance - In No Acute Distress] : no acute distress [Normal Conjunctiva] : the conjunctiva exhibited no abnormalities [Eyelids - No Xanthelasma] : the eyelids demonstrated no xanthelasmas [Normal Oral Mucosa] : normal oral mucosa [No Oral Pallor] : no oral pallor [No Oral Cyanosis] : no oral cyanosis [Normal Jugular Venous A Waves Present] : normal jugular venous A waves present [Normal Jugular Venous V Waves Present] : normal jugular venous V waves present [No Jugular Venous Quintanilla A Waves] : no jugular venous quintanilla A waves [Heart Rate And Rhythm] : heart rate and rhythm were normal [Heart Sounds] : normal S1 and S2 [Murmurs] : no murmurs present [Respiration, Rhythm And Depth] : normal respiratory rhythm and effort [Exaggerated Use Of Accessory Muscles For Inspiration] : no accessory muscle use [Auscultation Breath Sounds / Voice Sounds] : lungs were clear to auscultation bilaterally [Abdomen Soft] : soft [Abdomen Tenderness] : non-tender [Abdomen Mass (___ Cm)] : no abdominal mass palpated [Abnormal Walk] : normal gait [Gait - Sufficient For Exercise Testing] : the gait was sufficient for exercise testing [Nail Clubbing] : no clubbing of the fingernails [Cyanosis, Localized] : no localized cyanosis [Petechial Hemorrhages (___cm)] : no petechial hemorrhages [Skin Color & Pigmentation] : normal skin color and pigmentation [] : no rash [No Venous Stasis] : no venous stasis [Skin Lesions] : no skin lesions [No Skin Ulcers] : no skin ulcer [No Xanthoma] : no  xanthoma was observed [Oriented To Time, Place, And Person] : oriented to person, place, and time [Affect] : the affect was normal [Mood] : the mood was normal [No Anxiety] : not feeling anxious

## 2019-03-07 NOTE — DISCUSSION/SUMMARY
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 Options regarding management, including a rate control strategy with AV kaylynn blocking agents, or rhythm control strategies employing anti-arrhythmic drugs and/or catheter ablation were reviewed. Given the onset of biventricular dysfunction associated with the arrhythmia I counseled Mr. Santiago that an ablation strategy would offer him the highest likelihood of longterm freedom from recurrent AF. The rationale for the procedure as well as its risks--including but not limited to bleeding, vascular injury, pericardial effusion/tamponade, heart block requiring pacemaker, stroke, and death--were reviewed in detail. After consideration of this information, the decision was made to proceed with the procedure.\par \par Mr. Rice appeared to understand the whole discussion and verbalized that all of his questions were answered to his satisfaction. He will undergo DOMINGUEZ in anticipation of ablation. Amiodarone will be continued through the procedure.\par \par Thank you for allowing me to be involved in the care of this pleasant man. Please feel free to contact me with any questions.

## 2019-03-07 NOTE — HISTORY OF PRESENT ILLNESS
[FreeTextEntry1] : Referring Physician: \par \par Dear  *****:\par \par Mr. Alexandro Santiago was seen in the [facility] today. For our records, please allow me to summarize the history and my findings.\par \par This pleasant 63 year old man has a cardiovascular history significant for atrial fibrillation on Eliquis and asthma who in late February 2019 was transferred to [facility] from an outside hospital after admission with a wide complex tachycardia in setting of chest pain and palpitations. He was given an amiodarone bous which slowed the tach enough to reveal underlying atrial flutter. He spontaneously terminated back to sinus and then converted to atrial fibrillation (subsequently returning to sinus rhythm. \par \par TTE showed moderate biventricular dysfunction (new per patient). Normal coronary anatomy was seen on [facility]. He has felt well since discharge without recurrent palpitations.\par \par Mr. Santiago denies any recent history of chest pain, shortness of breath, palpitations, dizziness, or syncope.\par \par DIAGNOSTIC TEST\par

## 2019-03-27 ENCOUNTER — RX CHANGE (OUTPATIENT)
Age: 64
End: 2019-03-27

## 2019-03-27 RX ORDER — AMIODARONE HYDROCHLORIDE 200 MG/1
200 TABLET ORAL
Qty: 30 | Refills: 5 | Status: DISCONTINUED | COMMUNITY
End: 2019-03-27

## 2019-04-08 ENCOUNTER — APPOINTMENT (OUTPATIENT)
Dept: CV DIAGNOSITCS | Facility: HOSPITAL | Age: 64
End: 2019-04-08

## 2020-02-09 ENCOUNTER — INPATIENT (INPATIENT)
Facility: HOSPITAL | Age: 65
LOS: 1 days | Discharge: HOME | End: 2020-02-11
Attending: HOSPITALIST | Admitting: HOSPITALIST
Payer: COMMERCIAL

## 2020-02-09 VITALS
WEIGHT: 167.99 LBS | DIASTOLIC BLOOD PRESSURE: 62 MMHG | OXYGEN SATURATION: 98 % | HEIGHT: 71 IN | SYSTOLIC BLOOD PRESSURE: 92 MMHG | TEMPERATURE: 99 F | RESPIRATION RATE: 16 BRPM | HEART RATE: 122 BPM

## 2020-02-09 DIAGNOSIS — I47.1 SUPRAVENTRICULAR TACHYCARDIA: ICD-10-CM

## 2020-02-09 DIAGNOSIS — I48.11 LONGSTANDING PERSISTENT ATRIAL FIBRILLATION: ICD-10-CM

## 2020-02-09 DIAGNOSIS — J45.20 MILD INTERMITTENT ASTHMA, UNCOMPLICATED: ICD-10-CM

## 2020-02-09 DIAGNOSIS — Z29.9 ENCOUNTER FOR PROPHYLACTIC MEASURES, UNSPECIFIED: ICD-10-CM

## 2020-02-09 DIAGNOSIS — K86.9 DISEASE OF PANCREAS, UNSPECIFIED: ICD-10-CM

## 2020-02-09 LAB
ALBUMIN SERPL ELPH-MCNC: 2.9 G/DL — LOW (ref 3.3–5)
ALP SERPL-CCNC: 132 U/L — HIGH (ref 40–120)
ALT FLD-CCNC: 320 U/L — HIGH (ref 12–78)
ANION GAP SERPL CALC-SCNC: 14 MMOL/L — SIGNIFICANT CHANGE UP (ref 5–17)
APTT BLD: 26.8 SEC — LOW (ref 28.5–37)
AST SERPL-CCNC: 237 U/L — HIGH (ref 15–37)
BASOPHILS # BLD AUTO: 0.06 K/UL — SIGNIFICANT CHANGE UP (ref 0–0.2)
BASOPHILS NFR BLD AUTO: 0.7 % — SIGNIFICANT CHANGE UP (ref 0–2)
BILIRUB SERPL-MCNC: 1.4 MG/DL — HIGH (ref 0.2–1.2)
BUN SERPL-MCNC: 20 MG/DL — SIGNIFICANT CHANGE UP (ref 7–23)
CALCIUM SERPL-MCNC: 8.7 MG/DL — SIGNIFICANT CHANGE UP (ref 8.5–10.1)
CHLORIDE SERPL-SCNC: 106 MMOL/L — SIGNIFICANT CHANGE UP (ref 96–108)
CO2 SERPL-SCNC: 16 MMOL/L — LOW (ref 22–31)
CREAT SERPL-MCNC: 1.46 MG/DL — HIGH (ref 0.5–1.3)
EOSINOPHIL # BLD AUTO: 0.12 K/UL — SIGNIFICANT CHANGE UP (ref 0–0.5)
EOSINOPHIL NFR BLD AUTO: 1.4 % — SIGNIFICANT CHANGE UP (ref 0–6)
GLUCOSE SERPL-MCNC: 154 MG/DL — HIGH (ref 70–99)
HCT VFR BLD CALC: 46.2 % — SIGNIFICANT CHANGE UP (ref 39–50)
HGB BLD-MCNC: 14.6 G/DL — SIGNIFICANT CHANGE UP (ref 13–17)
IMM GRANULOCYTES NFR BLD AUTO: 0.3 % — SIGNIFICANT CHANGE UP (ref 0–1.5)
INR BLD: 3.51 RATIO — HIGH (ref 0.88–1.16)
LIDOCAIN IGE QN: 47 U/L — LOW (ref 73–393)
LYMPHOCYTES # BLD AUTO: 2.57 K/UL — SIGNIFICANT CHANGE UP (ref 1–3.3)
LYMPHOCYTES # BLD AUTO: 29.3 % — SIGNIFICANT CHANGE UP (ref 13–44)
MAGNESIUM SERPL-MCNC: 1.6 MG/DL — SIGNIFICANT CHANGE UP (ref 1.6–2.6)
MCHC RBC-ENTMCNC: 27.5 PG — SIGNIFICANT CHANGE UP (ref 27–34)
MCHC RBC-ENTMCNC: 31.6 GM/DL — LOW (ref 32–36)
MCV RBC AUTO: 87.2 FL — SIGNIFICANT CHANGE UP (ref 80–100)
MONOCYTES # BLD AUTO: 0.76 K/UL — SIGNIFICANT CHANGE UP (ref 0–0.9)
MONOCYTES NFR BLD AUTO: 8.7 % — SIGNIFICANT CHANGE UP (ref 2–14)
NEUTROPHILS # BLD AUTO: 5.22 K/UL — SIGNIFICANT CHANGE UP (ref 1.8–7.4)
NEUTROPHILS NFR BLD AUTO: 59.6 % — SIGNIFICANT CHANGE UP (ref 43–77)
NRBC # BLD: 0 /100 WBCS — SIGNIFICANT CHANGE UP (ref 0–0)
PLATELET # BLD AUTO: 101 K/UL — LOW (ref 150–400)
POTASSIUM SERPL-MCNC: 4.2 MMOL/L — SIGNIFICANT CHANGE UP (ref 3.5–5.3)
POTASSIUM SERPL-SCNC: 4.2 MMOL/L — SIGNIFICANT CHANGE UP (ref 3.5–5.3)
PROT SERPL-MCNC: 5.7 GM/DL — LOW (ref 6–8.3)
PROTHROM AB SERPL-ACNC: 40.9 SEC — HIGH (ref 10–12.9)
RBC # BLD: 5.3 M/UL — SIGNIFICANT CHANGE UP (ref 4.2–5.8)
RBC # FLD: 15.7 % — HIGH (ref 10.3–14.5)
SODIUM SERPL-SCNC: 136 MMOL/L — SIGNIFICANT CHANGE UP (ref 135–145)
TROPONIN I SERPL-MCNC: 0.03 NG/ML — SIGNIFICANT CHANGE UP (ref 0.01–0.04)
TSH SERPL-MCNC: 7.31 UIU/ML — HIGH (ref 0.36–3.74)
WBC # BLD: 8.76 K/UL — SIGNIFICANT CHANGE UP (ref 3.8–10.5)
WBC # FLD AUTO: 8.76 K/UL — SIGNIFICANT CHANGE UP (ref 3.8–10.5)

## 2020-02-09 PROCEDURE — 93010 ELECTROCARDIOGRAM REPORT: CPT

## 2020-02-09 PROCEDURE — 99291 CRITICAL CARE FIRST HOUR: CPT

## 2020-02-09 PROCEDURE — 76700 US EXAM ABDOM COMPLETE: CPT | Mod: 26

## 2020-02-09 PROCEDURE — 74177 CT ABD & PELVIS W/CONTRAST: CPT | Mod: 26

## 2020-02-09 PROCEDURE — 12345: CPT | Mod: NC

## 2020-02-09 PROCEDURE — 99222 1ST HOSP IP/OBS MODERATE 55: CPT | Mod: AI

## 2020-02-09 PROCEDURE — 99223 1ST HOSP IP/OBS HIGH 75: CPT

## 2020-02-09 RX ORDER — APIXABAN 2.5 MG/1
5 TABLET, FILM COATED ORAL EVERY 12 HOURS
Refills: 0 | Status: DISCONTINUED | OUTPATIENT
Start: 2020-02-09 | End: 2020-02-11

## 2020-02-09 RX ORDER — DILTIAZEM HCL 120 MG
60 CAPSULE, EXT RELEASE 24 HR ORAL ONCE
Refills: 0 | Status: COMPLETED | OUTPATIENT
Start: 2020-02-09 | End: 2020-02-09

## 2020-02-09 RX ORDER — AMIODARONE HYDROCHLORIDE 400 MG/1
150 TABLET ORAL ONCE
Refills: 0 | Status: COMPLETED | OUTPATIENT
Start: 2020-02-09 | End: 2020-02-09

## 2020-02-09 RX ORDER — INFLUENZA VIRUS VACCINE 15; 15; 15; 15 UG/.5ML; UG/.5ML; UG/.5ML; UG/.5ML
0.5 SUSPENSION INTRAMUSCULAR ONCE
Refills: 0 | Status: COMPLETED | OUTPATIENT
Start: 2020-02-09 | End: 2020-02-11

## 2020-02-09 RX ORDER — SODIUM CHLORIDE 9 MG/ML
1000 INJECTION, SOLUTION INTRAVENOUS
Refills: 0 | Status: DISCONTINUED | OUTPATIENT
Start: 2020-02-09 | End: 2020-02-10

## 2020-02-09 RX ORDER — ONDANSETRON 8 MG/1
4 TABLET, FILM COATED ORAL ONCE
Refills: 0 | Status: COMPLETED | OUTPATIENT
Start: 2020-02-09 | End: 2020-02-09

## 2020-02-09 RX ORDER — ASPIRIN/CALCIUM CARB/MAGNESIUM 324 MG
81 TABLET ORAL DAILY
Refills: 0 | Status: DISCONTINUED | OUTPATIENT
Start: 2020-02-09 | End: 2020-02-11

## 2020-02-09 RX ORDER — DILTIAZEM HCL 120 MG
30 CAPSULE, EXT RELEASE 24 HR ORAL
Refills: 0 | Status: DISCONTINUED | OUTPATIENT
Start: 2020-02-09 | End: 2020-02-11

## 2020-02-09 RX ORDER — SODIUM CHLORIDE 9 MG/ML
1000 INJECTION, SOLUTION INTRAVENOUS ONCE
Refills: 0 | Status: COMPLETED | OUTPATIENT
Start: 2020-02-09 | End: 2020-02-09

## 2020-02-09 RX ORDER — MORPHINE SULFATE 50 MG/1
2 CAPSULE, EXTENDED RELEASE ORAL ONCE
Refills: 0 | Status: DISCONTINUED | OUTPATIENT
Start: 2020-02-09 | End: 2020-02-09

## 2020-02-09 RX ORDER — SODIUM CHLORIDE 9 MG/ML
2000 INJECTION INTRAMUSCULAR; INTRAVENOUS; SUBCUTANEOUS ONCE
Refills: 0 | Status: DISCONTINUED | OUTPATIENT
Start: 2020-02-09 | End: 2020-02-09

## 2020-02-09 RX ADMIN — Medication 30 MILLIGRAM(S): at 23:53

## 2020-02-09 RX ADMIN — SODIUM CHLORIDE 100 MILLILITER(S): 9 INJECTION, SOLUTION INTRAVENOUS at 21:21

## 2020-02-09 RX ADMIN — Medication 30 MILLIGRAM(S): at 11:33

## 2020-02-09 RX ADMIN — SODIUM CHLORIDE 100 MILLILITER(S): 9 INJECTION, SOLUTION INTRAVENOUS at 11:33

## 2020-02-09 RX ADMIN — APIXABAN 5 MILLIGRAM(S): 2.5 TABLET, FILM COATED ORAL at 18:55

## 2020-02-09 RX ADMIN — Medication 81 MILLIGRAM(S): at 11:33

## 2020-02-09 RX ADMIN — Medication 30 MILLIGRAM(S): at 06:09

## 2020-02-09 RX ADMIN — MORPHINE SULFATE 2 MILLIGRAM(S): 50 CAPSULE, EXTENDED RELEASE ORAL at 05:39

## 2020-02-09 RX ADMIN — MORPHINE SULFATE 2 MILLIGRAM(S): 50 CAPSULE, EXTENDED RELEASE ORAL at 05:19

## 2020-02-09 RX ADMIN — Medication 30 MILLIGRAM(S): at 18:55

## 2020-02-09 RX ADMIN — SODIUM CHLORIDE 1000 MILLILITER(S): 9 INJECTION, SOLUTION INTRAVENOUS at 02:35

## 2020-02-09 RX ADMIN — APIXABAN 5 MILLIGRAM(S): 2.5 TABLET, FILM COATED ORAL at 06:12

## 2020-02-09 RX ADMIN — AMIODARONE HYDROCHLORIDE 618 MILLIGRAM(S): 400 TABLET ORAL at 02:33

## 2020-02-09 RX ADMIN — Medication 60 MILLIGRAM(S): at 03:15

## 2020-02-09 RX ADMIN — ONDANSETRON 4 MILLIGRAM(S): 8 TABLET, FILM COATED ORAL at 03:32

## 2020-02-09 NOTE — H&P ADULT - HISTORY OF PRESENT ILLNESS
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  Pt reports gets episodes of abdominal bloating and feels constipated(not actually constipated) and takes laxatives prn, had similar episode and took miralax first w/o help then had 2 cups of prune juice w/5-6bm's and pt still felt bloated and abd discofort and also did feel some palpitations and decided to call ems.  pt denies any fever, chills, sob, cp, n/v/d/ no travels or sick contacts.  In ed pt was in svt to >200 received amio then afib 130's and ct abdomen w/?pancreatic lesion

## 2020-02-09 NOTE — H&P ADULT - PROBLEM SELECTOR PLAN 1
admit to tele  start low dose cardizem and titrate, pt in past unable to tolerate BB w/bp  cardio consult  on eliquis

## 2020-02-09 NOTE — ED PROVIDER NOTE - PHYSICAL EXAMINATION
Gen: Alert, speaking in complete sentences  Head: NC, AT, EOMI, normal lids/conjunctiva  ENT: normal hearing, patent oropharynx without erythema/exudate, uvula midline  Neck: supple, no tenderness/meningismus, Trachea midline  Pulm: Bilateral clear BS, normal resp effort, no wheeze/stridor/retractions  CV: tachycardic +dist pulses  Abd: soft, no focal TTP, ND, +BS, no guarding/rebound tenderness  Mskel: no edema/erythema/cyanosis  Skin: no rash  Neuro: AAOx3, no sensory/motor deficits

## 2020-02-09 NOTE — ED PROVIDER NOTE - CARE PLAN
Principal Discharge DX:	SVT (supraventricular tachycardia)  Secondary Diagnosis:	Abdominal pain  Secondary Diagnosis:	Pancreatic lesion

## 2020-02-09 NOTE — CONSULT NOTE ADULT - SUBJECTIVE AND OBJECTIVE BOX
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      Pt reports gets episodes of abdominal bloating and feels constipated(not actually constipated) and takes laxatives prn, had similar episode and took miralax first w/o help then had 2 cups of prune juice w/5-6bm's and pt still felt bloated and abd discofort and also did feel some palpitations and decided to call ems.  pt denies any fever, chills, sob, cp, n/v/d/ no travels or sick contacts.    In ED pt found to be in svt to >200, received amio then afib 130's.  CT abdomen w suspected pancreatic lesion (09 Feb 2020 05:41)      REVIEW OF SYSTEMS:  -----------------------------    CONSTITUTIONAL: No fever, weight loss, or fatigue  EYES: No eye pain, visual disturbances, or discharge  ENMT:  No difficulty hearing, tinnitus, vertigo; No sinus or throat pain  NECK: No pain or stiffness  BREASTS: No pain, masses, or nipple discharge  RESPIRATORY: No cough, wheezing, chills or hemoptysis; No shortness of breath  CARDIOVASCULAR: See HPI  GASTROINTESTINAL: No abdominal or epigastric pain. No nausea, vomiting, or hematemesis; No diarrhea or constipation. No melena or hematochezia.  GENITOURINARY: No dysuria, frequency, hematuria, or incontinence  NEUROLOGICAL: No headaches, memory loss, loss of strength, numbness, or tremors  SKIN: No itching, burning, rashes, or lesions   LYMPH NODES: No enlarged glands  ENDOCRINE: No heat or cold intolerance; No hair loss  MUSCULOSKELETAL: No joint pain or swelling; No muscle, back, or extremity pain  PSYCHIATRIC: No depression, anxiety, mood swings, or difficulty sleeping  HEME/LYMPH: No easy bruising, or bleeding gums  ALLERGY AND IMMUNOLOGIC: No hives or eczema    Home Medications:  NA in chart    MEDICATIONS  (STANDING):  apixaban 5 milliGRAM(s) Oral every 12 hours  aspirin enteric coated 81 milliGRAM(s) Oral daily  diltiazem    Tablet 30 milliGRAM(s) Oral four times a day  lactated ringers. 1000 milliLiter(s) (100 mL/Hr) IV Continuous <Continuous>    ALLERGIES: No Known Allergies    FAMILY HISTORY: No pertinent family history in first degree relatives      PHYSICAL EXAMINATION:  -----------------------------  T(C): 36.9 (02-09-20 @ 11:32), Max: 37.2 (02-09-20 @ 01:35)  HR: 87 (02-09-20 @ 11:32) (87 - 122)  BP: 103/62 (02-09-20 @ 11:32) (92/62 - 124/93)  RR: 17 (02-09-20 @ 11:32) (16 - 17)  SpO2: 97% (02-09-20 @ 11:32) (97% - 98%)  Wt(kg): --    Height (cm): 180.34 (02-09 @ 01:35)  Weight (kg): 76.2 (02-09 @ 01:35)  BMI (kg/m2): 23.4 (02-09 @ 01:35)  BSA (m2): 1.96 (02-09 @ 01:35)    Constitutional: well developed, normal appearance, well groomed, well nourished, no deformities and no acute distress.   Eyes: the conjunctiva exhibited no abnormalities and the eyelids demonstrated no xanthelasmas.   HEENT: normal oral mucosa, no oral pallor and no oral cyanosis.   Neck: normal jugular venous A waves present, normal jugular venous V waves present and no jugular venous cruz A waves.   Pulmonary: no respiratory distress, normal respiratory rhythm and effort, no accessory muscle use and lungs were clear to auscultation bilaterally.   Cardiovascular: heart rate and rhythm were normal, normal S1 and S2 and no murmur, gallop, rub, heave or thrill are present.   Abdomen: soft, non-tender, no hepato-splenomegaly and no abdominal mass palpated.   Musculoskeletal: the gait could not be assessed..   Extremities: no clubbing of the fingernails, no localized cyanosis, no petechial hemorrhages and no ischemic changes.   Skin: normal skin color and pigmentation, no rash, no venous stasis, no skin lesions, no skin ulcer and no xanthoma was observed.   Psychiatric: oriented to person, place, and time, the affect was normal, the mood was normal and not feeling anxious.     ECG:  -------        LABS:   --------  02-09    136  |  106  |  20  ----------------------------<  154<H>  4.2   |  16<L>  |  1.46<H>    Ca    8.7      09 Feb 2020 03:11  Mg     1.6     02-09    TPro  5.7<L>  /  Alb  2.9<L>  /  TBili  1.4<H>  /  DBili  x   /  AST  237<H>  /  ALT  320<H>  /  AlkPhos  132<H>  02-09                         14.6   8.76  )-----------( 101      ( 09 Feb 2020 02:29 )             46.2     PT/INR - ( 09 Feb 2020 03:11 )   PT: 40.9 sec;   INR: 3.51 ratio         PTT - ( 09 Feb 2020 03:11 )  PTT:26.8 sec    02-09 @ 03:11 CPK total:--, CKMB --, Troponin I - .032 ng/mL          RADIOLOGY REPORTS:  -----------------------------    < from: Cardiac Cath Lab - Adult (02.21.19 @ 15:30) >    [facility]     Cath Lab Report -- Comprehensive Report  Patient: RACHEL SARAH  Study date: 02/21/2019    VENTRICLES: No left ventriculogram was performed.  CORONARY VESSELS: The coronary circulation is right dominant.  LM:   --  LM: Normal.  LAD:   --  LAD: Normal.  CX:   --  Circumflex: Normal.  RCA:   --  RCA: Normal.  COMPLICATIONS: There were no complications.  DIAGNOSTIC RECOMMENDATIONS: Medical management is recommended.  Prepared and signed by  Kota Mandujano M.D.    < end of copied text >      ECHOCARDIOGRAM:  ---------------------------    < from: TTE with Doppler (w/Cont) (02.20.19 @ 19:38) >    Patient name: RACHEL SARAH  YOB: 1955   Age: 63 (M)   MR#: [id number]  Study Date: 2/20/2019  Location: [place] [id number]: [facility]  Study quality: Technically fair  Referring Physician: Williams Pineda MD  Blood Pressure: 119/82 mmHg  Height: 180 cm  Weight: 79 kg  BSA: 2 m2  Heart Rate: 76 mmHg  ------------------------------------------------------------------------  PROCEDURE: Transthoracic echocardiogram with 2-D, M-Mode  and complete spectral and color flow Doppler. Verbal  consent was obtained for injection of  Ultrasonic Enhancing  Agent following a discussion of risks and benefits.  Following intravenous injection of Ultrasonic Enhancing  Agent , harmonic imaging was performed.  INDICATION: Abnormal electrocardiogram (ECG) (EKG) (R94.31)  ------------------------------------------------------------------------  Dimensions:    Normal Values:  LA:     3.9    2.0 - 4.0 cm  Ao:     2.7    2.0 - 3.8 cm  SEPTUM: 0.8    0.6 - 1.2 cm  PWT:    1.0  0.6 - 1.1 cm  LVIDd:  4.7    3.0 - 5.6 cm  LVIDs:  3.3    1.8 - 4.0 cm  Derived variables:  LVMI: 72 g/m2  RWT: 0.42  Fractional short: 30 %  EF (Visual Estimate): 35-40 %  Doppler Peak Velocity (m/sec): AoV=0.8 TV=2.5  ------------------------------------------------------------------------  Observations:  Mitral Valve: Mitral annular calcification, otherwise  normal mitral valve. Mild-moderate mitral regurgitation.  Aortic Valve/Aorta: Calcified trileaflet aortic valve with  normal opening. Peak transaortic valve gradient equals 3 mm  Hg. Minimal aortic regurgitation.  Peak left ventricular  outflow tract gradient equals 1 mm Hg.  Aortic Root: 2.7 cm.  Left Atrium: Normal left atrium.  LA volume index = 31  cc/m2.  Left Ventricle: Moderate global left ventricular systolic  dysfunction.Endocardial visualization enhanced with  intravenous injection of Ultrasonic Enhancing Agent  (Definity). Normal left ventricular internal dimensions and  wall thicknesses. Normal diastolic function  Right Heart: Moderate right atrial enlargement. Right  ventricular enlargement with decreased right ventricular  systolic function. Normal tricuspid valve. Moderate-severe  tricuspid regurgitation. Pulmonic valve not well  visualized. Minimal pulmonic regurgitation.  Pericardium/Pleura: Normal pericardium with no pericardial  effusion.  Hemodynamic: Estimated right atrial pressure is 8 mm Hg.  Estimated right ventricular systolic pressure equals 33 mm  Hg, assuming right atrial pressure equals 8 mm Hg,  consistent with normal pulmonary pressures.  ------------------------------------------------------------------------  Conclusions:  1. Mitral annular calcification, otherwise normal mitral valve. Mild-moderate mitral regurgitation.  2. Normal left ventricular internal dimensions and wall thicknesses.  3. Moderate global left ventricular systolic dysfunction. Endocardial visualization enhanced with  intravenous injection of Ultrasonic Enhancing Agent (Definity).  4. Moderate right atrial enlargement.  5. Right ventricular enlargement with decreased right ventricular systolic function.  6. Estimated right ventricular systolic pressure equals 33 mm Hg, assuming right atrial pressure equals 8 mm Hg, consistent with normal pulmonary pressures.  7. Normal tricuspid valve. Moderate-severe tricuspid regurgitation.  *** No previous Echo exam.  ------------------------------------------------------------------------  Confirmed on  2/20/2019 - 21:12:13 by Jimmy Al M.D.  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    Seen in [facility] EPS clinic and stated that he likely has PAF with a tachymyoapathy.    Pt reports gets episodes of abdominal bloating and feels constipated(not actually constipated) and takes laxatives prn, had similar episode and took miralax first w/o help then had 2 cups of prune juice w/5-6bm's and pt still felt bloated and abd discofort and also did feel some palpitations and decided to call ems.  pt denies any fever, chills, sob, cp, n/v/d/ no travels or sick contacts.    In ED pt found to be in svt to >200, received amio then afib 130's now back to SR in 70's.  CT abdomen w suspected pancreatic lesion (09 Feb 2020 05:41)      REVIEW OF SYSTEMS:  -----------------------------    CONSTITUTIONAL: No fever, weight loss, or fatigue  EYES: No eye pain, visual disturbances, or discharge  ENMT:  No difficulty hearing, tinnitus, vertigo; No sinus or throat pain  NECK: No pain or stiffness  BREASTS: No pain, masses, or nipple discharge  RESPIRATORY: No cough, wheezing, chills or hemoptysis; No shortness of breath  CARDIOVASCULAR: See HPI  GASTROINTESTINAL: No abdominal or epigastric pain. No nausea, vomiting, or hematemesis; No diarrhea or constipation. No melena or hematochezia.  GENITOURINARY: No dysuria, frequency, hematuria, or incontinence  NEUROLOGICAL: No headaches, memory loss, loss of strength, numbness, or tremors  SKIN: No itching, burning, rashes, or lesions   LYMPH NODES: No enlarged glands  ENDOCRINE: No heat or cold intolerance; No hair loss  MUSCULOSKELETAL: No joint pain or swelling; No muscle, back, or extremity pain  PSYCHIATRIC: No depression, anxiety, mood swings, or difficulty sleeping  HEME/LYMPH: No easy bruising, or bleeding gums  ALLERGY AND IMMUNOLOGIC: No hives or eczema    Home Medications:  NA in chart    MEDICATIONS  (STANDING):  apixaban 5 milliGRAM(s) Oral every 12 hours  aspirin enteric coated 81 milliGRAM(s) Oral daily  diltiazem    Tablet 30 milliGRAM(s) Oral four times a day  lactated ringers. 1000 milliLiter(s) (100 mL/Hr) IV Continuous <Continuous>    ALLERGIES: No Known Allergies    FAMILY HISTORY: No pertinent family history in first degree relatives      PHYSICAL EXAMINATION:  -----------------------------  T(C): 36.9 (02-09-20 @ 11:32), Max: 37.2 (02-09-20 @ 01:35)  HR: 87 (02-09-20 @ 11:32) (87 - 122)  BP: 103/62 (02-09-20 @ 11:32) (92/62 - 124/93)  RR: 17 (02-09-20 @ 11:32) (16 - 17)  SpO2: 97% (02-09-20 @ 11:32) (97% - 98%)  Wt(kg): --    Height (cm): 180.34 (02-09 @ 01:35)  Weight (kg): 76.2 (02-09 @ 01:35)  BMI (kg/m2): 23.4 (02-09 @ 01:35)  BSA (m2): 1.96 (02-09 @ 01:35)    Constitutional: well developed, normal appearance, well groomed, well nourished, no deformities and no acute distress.   Eyes: the conjunctiva exhibited no abnormalities and the eyelids demonstrated no xanthelasmas.   HEENT: normal oral mucosa, no oral pallor and no oral cyanosis.   Neck: normal jugular venous A waves present, normal jugular venous V waves present and no jugular venous cruz A waves.   Pulmonary: no respiratory distress, normal respiratory rhythm and effort, no accessory muscle use and lungs were clear to auscultation bilaterally.   Cardiovascular: heart rate and rhythm were normal, normal S1 and S2 and no murmur, gallop, rub, heave or thrill are present.   Abdomen: soft, non-tender, no hepato-splenomegaly and no abdominal mass palpated.   Musculoskeletal: the gait could not be assessed..   Extremities: no clubbing of the fingernails, no localized cyanosis, no petechial hemorrhages and no ischemic changes.   Skin: normal skin color and pigmentation, no rash, no venous stasis, no skin lesions, no skin ulcer and no xanthoma was observed.   Psychiatric: oriented to person, place, and time, the affect was normal, the mood was normal and not feeling anxious.     ECG:  -------        LABS:   --------  02-09    136  |  106  |  20  ----------------------------<  154<H>  4.2   |  16<L>  |  1.46<H>    Ca    8.7      09 Feb 2020 03:11  Mg     1.6     02-09    TPro  5.7<L>  /  Alb  2.9<L>  /  TBili  1.4<H>  /  DBili  x   /  AST  237<H>  /  ALT  320<H>  /  AlkPhos  132<H>  02-09                         14.6   8.76  )-----------( 101      ( 09 Feb 2020 02:29 )             46.2     PT/INR - ( 09 Feb 2020 03:11 )   PT: 40.9 sec;   INR: 3.51 ratio         PTT - ( 09 Feb 2020 03:11 )  PTT:26.8 sec    02-09 @ 03:11 CPK total:--, CKMB --, Troponin I - .032 ng/mL          RADIOLOGY REPORTS:  -----------------------------    < from: Cardiac Cath Lab - Adult (02.21.19 @ 15:30) >    [facility]     Cath Lab Report -- Comprehensive Report  Patient: RACHEL SARAH  Study date: 02/21/2019    VENTRICLES: No left ventriculogram was performed.  CORONARY VESSELS: The coronary circulation is right dominant.  LM:   --  LM: Normal.  LAD:   --  LAD: Normal.  CX:   --  Circumflex: Normal.  RCA:   --  RCA: Normal.  COMPLICATIONS: There were no complications.  DIAGNOSTIC RECOMMENDATIONS: Medical management is recommended.  Prepared and signed by  Kota Mandujano M.D.    < end of copied text >      ECHOCARDIOGRAM:  ---------------------------    < from: TTE with Doppler (w/Cont) (02.20.19 @ 19:38) >    Patient name: RACHEL SARAH  YOB: 1955   Age: 63 (M)   MR#: [id number]  Study Date: 2/20/2019  Location: [place] [id number]: [facility]  Study quality: Technically fair  Referring Physician: Williams Pineda MD  Blood Pressure: 119/82 mmHg  Height: 180 cm  Weight: 79 kg  BSA: 2 m2  Heart Rate: 76 mmHg  ------------------------------------------------------------------------  PROCEDURE: Transthoracic echocardiogram with 2-D, M-Mode  and complete spectral and color flow Doppler. Verbal  consent was obtained for injection of  Ultrasonic Enhancing  Agent following a discussion of risks and benefits.  Following intravenous injection of Ultrasonic Enhancing  Agent , harmonic imaging was performed.  INDICATION: Abnormal electrocardiogram (ECG) (EKG) (R94.31)  ------------------------------------------------------------------------  Dimensions:    Normal Values:  LA:     3.9    2.0 - 4.0 cm  Ao:     2.7    2.0 - 3.8 cm  SEPTUM: 0.8    0.6 - 1.2 cm  PWT:    1.0  0.6 - 1.1 cm  LVIDd:  4.7    3.0 - 5.6 cm  LVIDs:  3.3    1.8 - 4.0 cm  Derived variables:  LVMI: 72 g/m2  RWT: 0.42  Fractional short: 30 %  EF (Visual Estimate): 35-40 %  Doppler Peak Velocity (m/sec): AoV=0.8 TV=2.5  ------------------------------------------------------------------------  Observations:  Mitral Valve: Mitral annular calcification, otherwise  normal mitral valve. Mild-moderate mitral regurgitation.  Aortic Valve/Aorta: Calcified trileaflet aortic valve with  normal opening. Peak transaortic valve gradient equals 3 mm  Hg. Minimal aortic regurgitation.  Peak left ventricular  outflow tract gradient equals 1 mm Hg.  Aortic Root: 2.7 cm.  Left Atrium: Normal left atrium.  LA volume index = 31  cc/m2.  Left Ventricle: Moderate global left ventricular systolic  dysfunction.Endocardial visualization enhanced with  intravenous injection of Ultrasonic Enhancing Agent  (Definity). Normal left ventricular internal dimensions and  wall thicknesses. Normal diastolic function  Right Heart: Moderate right atrial enlargement. Right  ventricular enlargement with decreased right ventricular  systolic function. Normal tricuspid valve. Moderate-severe  tricuspid regurgitation. Pulmonic valve not well  visualized. Minimal pulmonic regurgitation.  Pericardium/Pleura: Normal pericardium with no pericardial  effusion.  Hemodynamic: Estimated right atrial pressure is 8 mm Hg.  Estimated right ventricular systolic pressure equals 33 mm  Hg, assuming right atrial pressure equals 8 mm Hg,  consistent with normal pulmonary pressures.  ------------------------------------------------------------------------  Conclusions:  1. Mitral annular calcification, otherwise normal mitral valve. Mild-moderate mitral regurgitation.  2. Normal left ventricular internal dimensions and wall thicknesses.  3. Moderate global left ventricular systolic dysfunction. Endocardial visualization enhanced with  intravenous injection of Ultrasonic Enhancing Agent (Definity).  4. Moderate right atrial enlargement.  5. Right ventricular enlargement with decreased right ventricular systolic function.  6. Estimated right ventricular systolic pressure equals 33 mm Hg, assuming right atrial pressure equals 8 mm Hg, consistent with normal pulmonary pressures.  7. Normal tricuspid valve. Moderate-severe tricuspid regurgitation.  *** No previous Echo exam.  ------------------------------------------------------------------------  Confirmed on  2/20/2019 - 21:12:13 by Jimmy Al M.D.  ------------------------------------------------------------------------    < end of copied text >

## 2020-02-09 NOTE — PATIENT PROFILE ADULT - NSPROMUTINFOINDIVIDFT_GEN_A_NUR
Pt requests to have room curtain closed USP, belongings in reach, and to be called by his first name, "Alexandro"

## 2020-02-09 NOTE — ED PROVIDER NOTE - OBJECTIVE STATEMENT
Pertinent PMH/PSH/FHx/SHx and Review of Systems contained within:    63yo M w PMH of asthma, Afib on Eliquis, s/p ablation last year presents to ED for eval of abd pain & diarrhea, noted to be in SVT in triage.  Pt states he was feeling abd discomfort & drank large amount of prune juice, followed by large amount of diarrhea.  Pt states he then did not feel well and came to ED.  Denies feeling CP, palpitations, SOB, syncope.  Pt states he has been compliant w his meds.    No fever/chills, No photophobia/eye pain/changes in vision, No ear pain/sore throat/dysphagia, No chest pain, no SOB/cough/wheeze/stridor, No neck/back pain, no rash, no changes in neurological status/function.

## 2020-02-09 NOTE — PATIENT PROFILE ADULT - NSPROEDALEARNPREF_GEN_A_NUR
audio/written material/verbal instruction/skill demonstration/computer/internet/individual instruction/video

## 2020-02-09 NOTE — H&P ADULT - NSHPLABSRESULTS_GEN_ALL_CORE
LABS:                        14.6   8.76  )-----------( 101      ( 09 Feb 2020 02:29 )             46.2     02-09    136  |  106  |  20  ----------------------------<  154<H>  4.2   |  16<L>  |  1.46<H>    Ca    8.7      09 Feb 2020 03:11  Mg     1.6     02-09    TPro  5.7<L>  /  Alb  2.9<L>  /  TBili  1.4<H>  /  DBili  x   /  AST  237<H>  /  ALT  320<H>  /  AlkPhos  132<H>  02-09    PT/INR - ( 09 Feb 2020 03:11 )   PT: 40.9 sec;   INR: 3.51 ratio         PTT - ( 09 Feb 2020 03:11 )  PTT:26.8 sec    CAPILLARY BLOOD GLUCOSE            RADIOLOGY & ADDITIONAL TESTS:    Imaging Personally Reviewed:  [ X] YES  [ ] NO

## 2020-02-09 NOTE — ED ADULT TRIAGE NOTE - CHIEF COMPLAINT QUOTE
Please follow up with Dr. Ferguson prior to last chemotherapy.  Please schedule labs in infusion prior to follow up appointment.    We will send a message to Dr. Blanco's nurse to reschedule surgery.  If you don't hear anything from them in the next couple days please call us.  You can leave the Pre-op scheduled as is.    Last chemotherapy on 1/17/17 and will continue Herceptin every 3 weeks for a total of 1 year.    Follow Up Date/Time: 1/17/17 at 8:45    If you have any questions or concerns please feel free to call.    Juan José Hernandez, RN, BSN   Oncology Care Coordinator RN  [facility]  [phone]       BIBA- constipation X1 week. Drank prune juice with positive effects but now c/o CP/ABD and palpitations. HX afib, on eliquis, metoprolol

## 2020-02-09 NOTE — CHART NOTE - NSCHARTNOTEFT_GEN_A_CORE
patient seen and examined     elevated heart rate SVT vs Afib     On cardizem   consider putting back on Amiodarone and beta blocker     Doubt pancreattitis continue to monitor

## 2020-02-09 NOTE — CONSULT NOTE ADULT - ASSESSMENT
SVT on CACB, Elliquis  Pancreatic lesion    The chart has been reviewed but the patient has not yet been examined.  Full note to follow. 63 yo male with PAF, s/p RFA at [facility] last year. Followed by Dr. Ha.  Admitted with abdominal complaints, found to be in SVT on CACB, since converted in ED. On NOAC.  Also suspected pancreatic lesion seen on CT.    Plan:  Continue to monitor.  On DOAC and low dose bbl.  May need re-evaluation by EPS to decide regarding repeat RFA, as out-patient.  GI evaluation in progress.

## 2020-02-09 NOTE — ED ADULT NURSE NOTE - NSIMPLEMENTINTERV_GEN_ALL_ED
Implemented All Universal Safety Interventions:  [place] to call system. Call bell, personal items and telephone within reach. Instruct patient to call for assistance. Room bathroom lighting operational. Non-slip footwear when patient is off stretcher. Physically safe environment: no spills, clutter or unnecessary equipment. Stretcher in lowest position, wheels locked, appropriate side rails in place.

## 2020-02-09 NOTE — H&P ADULT - NSHPPHYSICALEXAM_GEN_ALL_CORE
Vital Signs Last 24 Hrs  T(C): 37.2 (09 Feb 2020 01:35), Max: 37.2 (09 Feb 2020 01:35)  T(F): 99 (09 Feb 2020 01:35), Max: 99 (09 Feb 2020 01:35)  HR: 113 (09 Feb 2020 03:14) (113 - 122)  BP: 102/80 (09 Feb 2020 03:12) (92/62 - 102/80)  BP(mean): --  RR: 16 (09 Feb 2020 01:35) (16 - 16)  SpO2: 98% (09 Feb 2020 01:35) (98% - 98%)    PHYSICAL EXAM:    GENERAL: NAD, well-groomed, well-developed  HEAD:  Atraumatic, Normocephalic  EYES: EOMI, PERRLA, conjunctiva and sclera clear  ENMT: No tonsillar erythema, exudates, or enlargement; Moist mucous membranes, No lesions  NECK: Supple, No JVD, Normal thyroid  NERVOUS SYSTEM:  Alert & Oriented X3, Motor Strength 5/5 B/L upper and lower extremities; DTRs 2+ intact and symmetric  CHEST/LUNG: Clear to percussion bilaterally; No rales, rhonchi, wheezing, or rubs  HEART: irRegular rate and rhythm tachy; No rubs, or gallops, +S1,S2  ABDOMEN: Soft, Nontender, Nondistended; Bowel sounds present  EXTREMITIES:  2+ Peripheral Pulses, No clubbing, cyanosis, or edema  LYMPH: No cervical adenopathy  RECTAL: deferred  BREAST: No palpatble masses, skin no lesions   : deferred  SKIN: No rashes or lesions    IMPROVE VTE Individual Risk Assessment        RISK                                                          Points  [  ] Previous VTE                                                3  [  ] Thrombophilia                                             2  [  ] Lower limb paralysis                                    2        (unable to hold up >15 seconds)    [  ] Current Cancer                                             2         (within 6 months)  [ x ] Immobilization > 24 hrs                              1  [  ] ICU/CCU stay > 24 hours                            1  [x  ] Age > 60                                                    1  IMPROVE VTE Score ____2_____

## 2020-02-09 NOTE — ED ADULT NURSE NOTE - OBJECTIVE STATEMENT
PT C/O ABD PAIN X 3DAYS. PT C/O CONSTIPATION AND TOOK PRUNE JUICE WHICH GAVE HIM RELIEF. PT PW SVT RYTHYM . PT HAD ABLATION (5/1/19

## 2020-02-09 NOTE — ED ADULT NURSE REASSESSMENT NOTE - NS ED NURSE REASSESS COMMENT FT1
recived pt to bed 6 awake alert and oriented times. 4. pt denies pain at this time. noted in sinus tach on monitor. 20 guage iv noted to right ac. site intact. lactated ringers infusing at 100ml/ hr. pt admitted awaiting bed. plan of care explained to pt who verbalized understanding.

## 2020-02-09 NOTE — PATIENT PROFILE ADULT - FALL HARM RISK CONCLUSION
Anesthesia Type: 1% lidocaine without epinephrine Biopsy Type: H and E Post-Care Instructions: I reviewed with the patient in detail post-care instructions. Patient is to keep the biopsy site dry overnight, and then apply bacitracin twice daily until healed. Patient may apply hydrogen peroxide soaks to remove any crusting. Anesthesia Volume In Cc: 0.5 Bill 94142 For Specimen Handling/Conveyance To Laboratory?: no Additional Anticipated Plans: If malignant consider Mohs surgery Hemostasis: Electrocautery Silver Nitrate Text: The wound bed was treated with silver nitrate after the biopsy was performed. Size Of Lesion In Cm: 0.2 Additional Anesthesia Volume In Cc (Will Not Render If 0): 0 Biopsy Method: Dermablade Notification Instructions: Patient will be notified of biopsy results. However, patient instructed to call the office if not contacted within 2 weeks. Cryotherapy Text: The wound bed was treated with cryotherapy after the biopsy was performed. Consent: Verbal consent was obtained and risks were reviewed including but not limited to scarring, infection, bleeding, scabbing, incomplete removal, nerve damage and allergy to anesthesia. Electrodesiccation Text: The wound bed was treated with electrodesiccation after the biopsy was performed. Dressing: bandage Curettage Text: The wound bed was treated with curettage after the biopsy was performed. Electrodesiccation And Curettage Text: The wound bed was treated with electrodesiccation and curettage after the biopsy was performed. Was A Bandage Applied: Yes Detail Level: Detailed Billing Type: Third-Party Bill Wound Care: Aquaphor Type Of Destruction Used: Electrodesiccation and Curettage Fall Risk

## 2020-02-10 LAB
ANION GAP SERPL CALC-SCNC: 9 MMOL/L — SIGNIFICANT CHANGE UP (ref 5–17)
BUN SERPL-MCNC: 18 MG/DL — SIGNIFICANT CHANGE UP (ref 7–23)
CALCIUM SERPL-MCNC: 9.3 MG/DL — SIGNIFICANT CHANGE UP (ref 8.5–10.1)
CHLORIDE SERPL-SCNC: 105 MMOL/L — SIGNIFICANT CHANGE UP (ref 96–108)
CO2 SERPL-SCNC: 23 MMOL/L — SIGNIFICANT CHANGE UP (ref 22–31)
CREAT SERPL-MCNC: 1.18 MG/DL — SIGNIFICANT CHANGE UP (ref 0.5–1.3)
GLUCOSE SERPL-MCNC: 77 MG/DL — SIGNIFICANT CHANGE UP (ref 70–99)
HCT VFR BLD CALC: 38.8 % — LOW (ref 39–50)
HGB BLD-MCNC: 12.7 G/DL — LOW (ref 13–17)
MAGNESIUM SERPL-MCNC: 1.8 MG/DL — SIGNIFICANT CHANGE UP (ref 1.6–2.6)
MCHC RBC-ENTMCNC: 27.9 PG — SIGNIFICANT CHANGE UP (ref 27–34)
MCHC RBC-ENTMCNC: 32.7 GM/DL — SIGNIFICANT CHANGE UP (ref 32–36)
MCV RBC AUTO: 85.3 FL — SIGNIFICANT CHANGE UP (ref 80–100)
NRBC # BLD: 0 /100 WBCS — SIGNIFICANT CHANGE UP (ref 0–0)
PHOSPHATE SERPL-MCNC: 3.5 MG/DL — SIGNIFICANT CHANGE UP (ref 2.5–4.5)
PLATELET # BLD AUTO: 104 K/UL — LOW (ref 150–400)
POTASSIUM SERPL-MCNC: 4.6 MMOL/L — SIGNIFICANT CHANGE UP (ref 3.5–5.3)
POTASSIUM SERPL-SCNC: 4.6 MMOL/L — SIGNIFICANT CHANGE UP (ref 3.5–5.3)
RBC # BLD: 4.55 M/UL — SIGNIFICANT CHANGE UP (ref 4.2–5.8)
RBC # FLD: 15.6 % — HIGH (ref 10.3–14.5)
SODIUM SERPL-SCNC: 137 MMOL/L — SIGNIFICANT CHANGE UP (ref 135–145)
T3FREE SERPL-MCNC: 1.81 PG/ML — SIGNIFICANT CHANGE UP (ref 1.8–4.6)
T4 FREE SERPL-MCNC: 1 NG/DL — SIGNIFICANT CHANGE UP (ref 0.9–1.8)
WBC # BLD: 6.76 K/UL — SIGNIFICANT CHANGE UP (ref 3.8–10.5)
WBC # FLD AUTO: 6.76 K/UL — SIGNIFICANT CHANGE UP (ref 3.8–10.5)

## 2020-02-10 PROCEDURE — 99232 SBSQ HOSP IP/OBS MODERATE 35: CPT

## 2020-02-10 PROCEDURE — 99233 SBSQ HOSP IP/OBS HIGH 50: CPT

## 2020-02-10 RX ORDER — DILTIAZEM HCL 120 MG
1 CAPSULE, EXT RELEASE 24 HR ORAL
Qty: 30 | Refills: 0
Start: 2020-02-10 | End: 2020-03-10

## 2020-02-10 RX ORDER — PANTOPRAZOLE SODIUM 20 MG/1
40 TABLET, DELAYED RELEASE ORAL
Refills: 0 | Status: DISCONTINUED | OUTPATIENT
Start: 2020-02-10 | End: 2020-02-11

## 2020-02-10 RX ORDER — DILTIAZEM HCL 120 MG
10 CAPSULE, EXT RELEASE 24 HR ORAL ONCE
Refills: 0 | Status: COMPLETED | OUTPATIENT
Start: 2020-02-10 | End: 2020-02-10

## 2020-02-10 RX ORDER — SIMETHICONE 80 MG/1
80 TABLET, CHEWABLE ORAL THREE TIMES A DAY
Refills: 0 | Status: DISCONTINUED | OUTPATIENT
Start: 2020-02-10 | End: 2020-02-11

## 2020-02-10 RX ADMIN — Medication 1 TABLET(S): at 17:43

## 2020-02-10 RX ADMIN — Medication 30 MILLIGRAM(S): at 23:55

## 2020-02-10 RX ADMIN — Medication 30 MILLIGRAM(S): at 17:44

## 2020-02-10 RX ADMIN — Medication 10 MILLIGRAM(S): at 23:05

## 2020-02-10 RX ADMIN — Medication 30 MILLIGRAM(S): at 06:50

## 2020-02-10 RX ADMIN — Medication 81 MILLIGRAM(S): at 11:30

## 2020-02-10 RX ADMIN — SIMETHICONE 80 MILLIGRAM(S): 80 TABLET, CHEWABLE ORAL at 21:36

## 2020-02-10 RX ADMIN — SODIUM CHLORIDE 100 MILLILITER(S): 9 INJECTION, SOLUTION INTRAVENOUS at 02:08

## 2020-02-10 RX ADMIN — APIXABAN 5 MILLIGRAM(S): 2.5 TABLET, FILM COATED ORAL at 06:50

## 2020-02-10 RX ADMIN — PANTOPRAZOLE SODIUM 40 MILLIGRAM(S): 20 TABLET, DELAYED RELEASE ORAL at 17:43

## 2020-02-10 RX ADMIN — APIXABAN 5 MILLIGRAM(S): 2.5 TABLET, FILM COATED ORAL at 17:43

## 2020-02-10 NOTE — DISCHARGE NOTE PROVIDER - CARE PROVIDERS DIRECT ADDRESSES
,DirectAddress_Unknown,[email or web address] ,DirectAddress_Unknown,[email or web address],DirectAddress_Unknown

## 2020-02-10 NOTE — DISCHARGE NOTE PROVIDER - HOSPITAL COURSE
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  Pt reports gets episodes of abdominal bloating and feels constipated(not actually constipated) and takes laxatives prn, had similar episode and took miralax first w/o help then had 2 cups of prune juice w/5-6bm's and pt still felt bloated and abd discofort and also did feel some palpitations and decided to call ems.  pt denies any fever, chills, sob, cp, n/v/d/ no travels or sick contacts.  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  Pt reports gets episodes of abdominal bloating and feels constipated(not actually constipated) and takes laxatives prn, had similar episode and took miralax first w/o help then had 2 cups of prune juice w/5-6bm's and pt still felt bloated and abd discofort and also did feel some palpitations and decided to call ems.  pt denies any fever, chills, sob, cp, n/v/d/ no travels or sick contacts.  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 Presented to hospital with SVT.         CT revealed: fullness in the pancreatic head with nonspecific fluid in right anterior pararenal space, nonspecific. Differential: focal pancreatitis vs underlying pancreatic lesion. Thickening vs underdistention of rectum, correlate for proctitis.         Lipase wnl, , , CEA wnl. MRCP unremarkable (however study limited by lack of IV contrast)                  Problem/Recommendation - 1:    Problem: Pancreatic lesion. Recommendation: **Would order MRI with contrast as outpatient to r/o malignancy    Discussed close GI f/u.         Problem/Recommendation - 2:    ·  Problem: Abdominal pain, unspecified abdominal location.  Recommendation: -PPI daily     -Simethicone     -Advance diet as needed     **Colonoscopy as outpatient discussed given rectal thickening on imaging, change in bowel habits.             Patient will need to follow up with cardiolgy

## 2020-02-10 NOTE — PROGRESS NOTE ADULT - ATTENDING COMMENTS
PAF ---> NSR.  On low dose Cardizem, hemodynamically stable.  Out patient followup with EPS to re-evaluate need for repeat RFA.  Abdominal discomfort not resolved, pancreatic lesion suspected? disposition as per medicine.  TTE pending.  Stable otherwise from CV standpoint, f/u with Cardiology in 1 week.

## 2020-02-10 NOTE — DISCHARGE NOTE PROVIDER - CARE PROVIDER_API CALL
Jatinder Ha)  Cardiovascular Disease; Sleep Medicine  [address] [place] [address]  Phone: [phone]  Fax: [phone]  Follow Up Time:     Manuela Hess)  Cardiac Electrophysiology; Cardiovascular Disease; Internal Medicine  [address] [place] [address]  Phone: [phone]  Fax: [phone]  Follow Up Time: Jatinder Ha)  Cardiovascular Disease; Sleep Medicine  [address] [place] [address]  Phone: [phone]  Fax: [phone]  Follow Up Time:     Manuela Hess)  Cardiac Electrophysiology; Cardiovascular Disease; Internal Medicine  [address] [place] [address]  Phone: [phone]  Fax: [phone]  Follow Up Time:     Kacie Haywood)  Gastroenterology; Internal Medicine  [address] [place] [address]  Phone: [phone]  Fax: [phone]  Follow Up Time:

## 2020-02-10 NOTE — PROGRESS NOTE ADULT - ASSESSMENT
65 yo male with PAF, s/p RFA at [facility] 2019. Followed by Dr. Ha.  Admitted with abdominal complaints, found to be in SVT on CACB, s/p Amio bolus in ED, converted to afib then.   Now in RSR on tele  On DOAC - Eliquis   Also suspected pancreatic lesion seen on CT A/P.    Trop 0.032    ·	SVT  ·	Pancreatic lesion  ·	PAF  ·	Transaminitis  ·	Anemia  ·	Elevated INR  ·	MAURO - resolved  ·	Elevated TSH         Plan:  -Since converted to RS, rate controlled.   -Cardizem 30 q6  -?Cont DOAC with elevated INR, ? GI eval  -Monitor for bleeding  -Avoid hepato-toxic meds  -MAURO resolved  -TSH 7.3 with normal T4/T3  -May need re-evaluation by EPS to decide regarding repeat RFA, as out-patient.  -Activity as tolerated 63 yo male with PAF, s/p RFA at [facility] 2019. Followed by Dr. Ha.  Admitted with abdominal complaints, found to be in SVT on CACB, s/p Amio bolus in ED, converted to afib then.   Now in RSR on tele  On DOAC - Eliquis   Also suspected pancreatic lesion seen on CT A/P.  Still with c/o abd bloating    Trop 0.032    ·	SVT  ·	PAF  ·	Transaminitis  ·	Pancreatic lesion  ·	Anemia  ·	Elevated INR  ·	MAURO - resolved  ·	Elevated TSH         Plan:  -Since converted to RS, rate controlled.   -Cardizem 30 q6  -?Cont DOAC with elevated INR, ? GI eval  -Monitor for bleeding  -Avoid hepato-toxic meds  -MAURO resolved  -TSH 7.3 with normal T4/T3  -May need re-evaluation by EPS to decide regarding repeat RFA, as out-patient.  -Activity as tolerated 65 yo male with PAF, s/p RFA at [facility] 2019. Followed by Dr. Ha.  Admitted with abdominal complaints, found to be in SVT on CACB, s/p Amio bolus in ED, converted to afib then.   Now in RSR on tele  On DOAC - Eliquis   Also suspected pancreatic lesion seen on CT A/P.  Still with c/o abd bloating    Trop 0.032    ·	SVT  ·	PAF  ·	Transaminitis  ·	Pancreatic lesion  ·	Anemia  ·	Elevated INR  ·	MAURO - resolved  ·	Elevated TSH         Plan:  -Since converted to RS, rate controlled.   -Cardizem 30 q6, change to long acting formulation at discharge.  -?Cont DOAC with elevated INR, ? GI eval  -Monitor for bleeding  -Avoid hepato-toxic meds  -MAURO resolved  -TSH 7.3 with normal T4/T3  -May need re-evaluation by EPS to decide regarding repeat RFA, as out-patient.  -Activity as tolerated

## 2020-02-10 NOTE — DISCHARGE NOTE PROVIDER - NSDCMRMEDTOKEN_GEN_ALL_CORE_FT
apixaban 5 mg oral tablet: 1 tab(s) orally every 12 hours  aspirin 81 mg oral delayed release tablet: 1 tab(s) orally once a day  dilTIAZem 60 mg oral tablet: 1 tab(s) orally 4 times a day  lisinopril 2.5 mg oral tablet: 1 tab(s) orally once a day  pantoprazole 40 mg oral delayed release tablet: 1 tab(s) orally once a day (before a meal)  simethicone 80 mg oral tablet, chewable: 1 tab(s) orally 3 times a day

## 2020-02-10 NOTE — PROGRESS NOTE ADULT - SUBJECTIVE AND OBJECTIVE BOX
Patient is a 64y old  Male who presents with a chief complaint of stomach discomfort (09 Feb 2020 13:12)    PAST MEDICAL & SURGICAL HISTORY:  Atrial fibrillation  s/p RFA  Asthma  No significant past surgical history    INTERVAL HISTORY:  	  MEDICATIONS:  MEDICATIONS  (STANDING):  amoxicillin  875 milliGRAM(s)/clavulanate 1 Tablet(s) Oral two times a day  apixaban 5 milliGRAM(s) Oral every 12 hours  aspirin enteric coated 81 milliGRAM(s) Oral daily  diltiazem    Tablet 30 milliGRAM(s) Oral four times a day  influenza   Vaccine 0.5 milliLiter(s) IntraMuscular once    Vitals:  T(F): 97.3 (02-10-20 @ 11:33), Max: 98.3 (02-09-20 @ 23:37)  HR: 81 (02-10-20 @ 11:33) (69 - 81)  BP: 107/75 (02-10-20 @ 11:33) (97/71 - 120/72)  RR: 18 (02-10-20 @ 11:33) (17 - 19)  SpO2: 97% (02-10-20 @ 11:33) (96% - 97%)  Wt(kg): --83.9    02-09 @ 07:01  -  02-10 @ 07:00  --------------------------------------------------------  IN:    lactated ringers.: 1100 mL    Oral Fluid: 150 mL  Total IN: 1250 mL    OUT:  Total OUT: 0 mL    Total NET: 1250 mL    02-10 @ 07:01  -  02-10 @ 12:02  --------------------------------------------------------  IN:    Oral Fluid: 240 mL  Total IN: 240 mL    OUT:  Total OUT: 0 mL    Total NET: 240 mL    Weight (kg): 83.9 (02-09 @ 20:40)  BMI (kg/m2): 25.8 (02-09 @ 20:40)    PHYSICAL EXAM:  Neuro: Awake, responsive  CV: S1 S2 RRR  Lungs: CTABL  GI: Soft, BS +, ND, NT  Extremities: No edema    TELEMETRY: NSR  	  RADIOLOGY:     < from: CT Abdomen and Pelvis w/ IV Cont (02.09.20 @ 04:27) >  Fullness in the region of the pancreatic head with nonspecific fluid in the right anterior para renal space. Findings are nonspecific. Differential includes focal pancreatitis versus underlying pancreatic lesion. Lipase level correlation is recommended with consideration for endoscopic ultrasound.    Thickening versus underdistention of the rectum. Correlate for proctitis. Fluid-filled colon.    < end of copied text >    < from: US Abdomen Complete (02.09.20 @ 09:06) >  Impression:    Small amount of upper abdominal ascites.    Small liver with mild heterogeneity, nonspecific.    Limited visualization of the pancreas due to overlying bowel gas.    < end of copied text >    DIAGNOSTIC TESTING:    [X ] Echocardiogram:  < from: TTE with Doppler (w/Cont) (02.20.19 @ 19:38) >  Conclusions:  1. Mitral annular calcification, otherwise normal mitral  valve. Mild-moderate mitral regurgitation.  2. Normal left ventricular internal dimensions and wall  thicknesses.  3. Moderate global left ventricular systolic  dysfunction. Endocardial visualization enhanced with  intravenous injection of Ultrasonic Enhancing Agent  (Definity).  4. Moderate right atrial enlargement.  5. Right ventricular enlargement with decreased right  ventricular systolic function.  6. Estimated right ventricular systolic pressure equals 33  mm Hg, assuming right atrial pressure equals 8 mm Hg,  consistent with normal pulmonary pressures.  7. Normal tricuspid valve. Moderate-severe tricuspid  regurgitation.    < end of copied text >    [X ]  Catheterization:  < from: Cardiac Cath Lab - Adult (02.21.19 @ 15:30) >  VENTRICLES: No left ventriculogram was performed.  CORONARY VESSELS: The coronary circulation is right dominant.  LM:   --  LM: Normal.  LAD:   --  LAD: Normal.  CX:   --  Circumflex: Normal.  RCA:   --  RCA: Normal.    < end of copied text >    LABS:	 	    CARDIAC MARKERS:  Troponin I, Serum: .032 ng/mL (02-09 @ 03:11)    10 Feb 2020 08:06    137    |  105    |  18     ----------------------------<  77     4.6     |  23     |  1.18   09 Feb 2020 03:11    136    |  106    |  20     ----------------------------<  154    4.2     |  16     |  1.46     Ca    9.3        10 Feb 2020 08:06  Phos  3.5       10 Feb 2020 08:06  Mg     1.8       10 Feb 2020 08:06    TPro  5.7    /  Alb  2.9    /  TBili  1.4    /  DBili  x      /  AST  237    /  ALT  320    /  AlkPhos  132    09 Feb 2020 03:11                  12.7   6.76  )-----------( 104      ( 10 Feb 2020 08:06 )             38.8 ,                       14.6   8.76  )-----------( 101      ( 09 Feb 2020 02:29 )             46.2   TSH: Thyroid Stimulating Hormone, Serum: 7.310 uIU/mL (02-09 @ 03:11)  INR: 3.51 ratio (02-09 @ 03:11) Patient is a 64y old  Male who presents with a chief complaint of stomach discomfort (09 Feb 2020 13:12)    PAST MEDICAL & SURGICAL HISTORY:  Atrial fibrillation  s/p RFA  Asthma  No significant past surgical history    INTERVAL HISTORY: Denies CP, SOB, dizziness, palpitations Endorse ABD fullness  	  MEDICATIONS:  MEDICATIONS  (STANDING):  amoxicillin  875 milliGRAM(s)/clavulanate 1 Tablet(s) Oral two times a day  apixaban 5 milliGRAM(s) Oral every 12 hours  aspirin enteric coated 81 milliGRAM(s) Oral daily  diltiazem    Tablet 30 milliGRAM(s) Oral four times a day  influenza   Vaccine 0.5 milliLiter(s) IntraMuscular once    Vitals:  T(F): 97.3 (02-10-20 @ 11:33), Max: 98.3 (02-09-20 @ 23:37)  HR: 81 (02-10-20 @ 11:33) (69 - 81)  BP: 107/75 (02-10-20 @ 11:33) (97/71 - 120/72)  RR: 18 (02-10-20 @ 11:33) (17 - 19)  SpO2: 97% (02-10-20 @ 11:33) (96% - 97%)  Wt(kg): --83.9    02-09 @ 07:01  -  02-10 @ 07:00  --------------------------------------------------------  IN:    lactated ringers.: 1100 mL    Oral Fluid: 150 mL  Total IN: 1250 mL    OUT:  Total OUT: 0 mL    Total NET: 1250 mL    02-10 @ 07:01  -  02-10 @ 12:02  --------------------------------------------------------  IN:    Oral Fluid: 240 mL  Total IN: 240 mL    OUT:  Total OUT: 0 mL    Total NET: 240 mL    Weight (kg): 83.9 (02-09 @ 20:40)  BMI (kg/m2): 25.8 (02-09 @ 20:40)    PHYSICAL EXAM:  Neuro: Awake, responsive  CV: S1 S2 RRR  Lungs: CTABL  GI: Soft, BS +, tender to palpitation upper ABD BL, distended  Extremities: trace non pitting edema to LE    TELEMETRY: NSR  	  RADIOLOGY:     < from: CT Abdomen and Pelvis w/ IV Cont (02.09.20 @ 04:27) >  Fullness in the region of the pancreatic head with nonspecific fluid in the right anterior para renal space. Findings are nonspecific. Differential includes focal pancreatitis versus underlying pancreatic lesion. Lipase level correlation is recommended with consideration for endoscopic ultrasound.    Thickening versus underdistention of the rectum. Correlate for proctitis. Fluid-filled colon.    < end of copied text >    < from: US Abdomen Complete (02.09.20 @ 09:06) >  Impression:    Small amount of upper abdominal ascites.    Small liver with mild heterogeneity, nonspecific.    Limited visualization of the pancreas due to overlying bowel gas.    < end of copied text >    DIAGNOSTIC TESTING:    [X ] Echocardiogram:  < from: TTE with Doppler (w/Cont) (02.20.19 @ 19:38) >  Conclusions:  1. Mitral annular calcification, otherwise normal mitral  valve. Mild-moderate mitral regurgitation.  2. Normal left ventricular internal dimensions and wall  thicknesses.  3. Moderate global left ventricular systolic  dysfunction. Endocardial visualization enhanced with  intravenous injection of Ultrasonic Enhancing Agent  (Definity).  4. Moderate right atrial enlargement.  5. Right ventricular enlargement with decreased right  ventricular systolic function.  6. Estimated right ventricular systolic pressure equals 33  mm Hg, assuming right atrial pressure equals 8 mm Hg,  consistent with normal pulmonary pressures.  7. Normal tricuspid valve. Moderate-severe tricuspid  regurgitation.    < end of copied text >    [X ]  Catheterization:  < from: Cardiac Cath Lab - Adult (02.21.19 @ 15:30) >  VENTRICLES: No left ventriculogram was performed.  CORONARY VESSELS: The coronary circulation is right dominant.  LM:   --  LM: Normal.  LAD:   --  LAD: Normal.  CX:   --  Circumflex: Normal.  RCA:   --  RCA: Normal.    < end of copied text >    LABS:	 	    CARDIAC MARKERS:  Troponin I, Serum: .032 ng/mL (02-09 @ 03:11)    10 Feb 2020 08:06    137    |  105    |  18     ----------------------------<  77     4.6     |  23     |  1.18   09 Feb 2020 03:11    136    |  106    |  20     ----------------------------<  154    4.2     |  16     |  1.46     Ca    9.3        10 Feb 2020 08:06  Phos  3.5       10 Feb 2020 08:06  Mg     1.8       10 Feb 2020 08:06    TPro  5.7    /  Alb  2.9    /  TBili  1.4    /  DBili  x      /  AST  237    /  ALT  320    /  AlkPhos  132    09 Feb 2020 03:11                  12.7   6.76  )-----------( 104      ( 10 Feb 2020 08:06 )             38.8 ,                       14.6   8.76  )-----------( 101      ( 09 Feb 2020 02:29 )             46.2   TSH: Thyroid Stimulating Hormone, Serum: 7.310 uIU/mL (02-09 @ 03:11)  INR: 3.51 ratio (02-09 @ 03:11)

## 2020-02-10 NOTE — DISCHARGE NOTE PROVIDER - PROVIDER TOKENS
PROVIDER:[[id number]]],PROVIDER:[TOKEN:[[id number]]] PROVIDER:[TOKEN:[[id number]]],PROVIDER:[TOKEN:[[id number]]],PROVIDER:[TOKEN:[[id number]]]

## 2020-02-10 NOTE — PROGRESS NOTE ADULT - SUBJECTIVE AND OBJECTIVE BOX
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  Pt reports gets episodes of abdominal bloating and feels constipated(not actually constipated) and takes laxatives prn, had similar episode and took miralax first w/o help then had 2 cups of prune juice w/5-6bm's and pt still felt bloated and abd discofort and also did feel some palpitations and decided to call ems.  pt denies any fever, chills, sob, cp, n/v/d/ no travels or sick contacts.  In ed pt was in svt to >200 received amio then afib 130's and ct abdomen w/?pancreatic lesion (09 Feb 2020 05:41)    Patient is a 64y old  Male who presents with a chief complaint of stomach discomfort (10 Feb 2020 12:01)      INTERVAL HPI/OVERNIGHT EVENTS:    MEDICATIONS  (STANDING):  amoxicillin  875 milliGRAM(s)/clavulanate 1 Tablet(s) Oral two times a day  apixaban 5 milliGRAM(s) Oral every 12 hours  aspirin enteric coated 81 milliGRAM(s) Oral daily  diltiazem    Tablet 30 milliGRAM(s) Oral four times a day  influenza   Vaccine 0.5 milliLiter(s) IntraMuscular once  pantoprazole    Tablet 40 milliGRAM(s) Oral before breakfast  simethicone 80 milliGRAM(s) Chew three times a day    MEDICATIONS  (PRN):      Allergies    No Known Allergies    Intolerances        REVIEW OF SYSTEMS:  CONSTITUTIONAL: No fever, weight loss, or fatigue  EYES: No eye pain, visual disturbances, or discharge  ENMT:  No difficulty hearing, tinnitus, vertigo; No sinus or throat pain  NECK: No pain or stiffness  BREASTS: No pain, masses, or nipple discharge  RESPIRATORY: No cough, wheezing, chills or hemoptysis; No shortness of breath  CARDIOVASCULAR: No chest pain, palpitations, dizziness, or leg swelling  GASTROINTESTINAL: No abdominal or epigastric pain. No nausea, vomiting, or hematemesis; No diarrhea or constipation. No melena or hematochezia.  GENITOURINARY: No dysuria, frequency, hematuria, or incontinence  NEUROLOGICAL: No headaches, memory loss, loss of strength, numbness, or tremors  SKIN: No itching, burning, rashes, or lesions   LYMPH NODES: No enlarged glands  ENDOCRINE: No heat or cold intolerance; No hair loss  MUSCULOSKELETAL: No joint pain or swelling; No muscle, back, or extremity pain  PSYCHIATRIC: No depression, anxiety, mood swings, or difficulty sleeping  HEME/LYMPH: No easy bruising, or bleeding gums  ALLERGY AND IMMUNOLOGIC: No hives or eczema    Vital Signs Last 24 Hrs  T(C): 36.6 (10 Feb 2020 16:12), Max: 36.8 (09 Feb 2020 23:37)  T(F): 97.8 (10 Feb 2020 16:12), Max: 98.3 (09 Feb 2020 23:37)  HR: 80 (10 Feb 2020 16:12) (72 - 81)  BP: 114/82 (10 Feb 2020 16:12) (105/73 - 120/72)  BP(mean): --  RR: 17 (10 Feb 2020 16:12) (17 - 19)  SpO2: 96% (10 Feb 2020 16:12) (96% - 97%)    PHYSICAL EXAM:  GENERAL: NAD, well-groomed, well-developed  HEAD:  Atraumatic, Normocephalic  EYES: EOMI, PERRLA, conjunctiva and sclera clear  ENMT: No tonsillar erythema, exudates, or enlargement; Moist mucous membranes, Good dentition, No lesions  NECK: Supple, No JVD, Normal thyroid  NERVOUS SYSTEM:  Alert & Oriented X3, Good concentration; Motor Strength 5/5 B/L upper and lower extremities; DTRs 2+ intact and symmetric  CHEST/LUNG: Clear to percussion bilaterally; No rales, rhonchi, wheezing, or rubs  HEART: Regular rate and rhythm; No murmurs, rubs, or gallops  ABDOMEN: Soft, Nontender, Nondistended; Bowel sounds present  EXTREMITIES:  2+ Peripheral Pulses, No clubbing, cyanosis, or edema  LYMPH: No lymphadenopathy noted  SKIN: No rashes or lesions    LABS:                        12.7   6.76  )-----------( 104      ( 10 Feb 2020 08:06 )             38.8     02-10    137  |  105  |  18  ----------------------------<  77  4.6   |  23  |  1.18    Ca    9.3      10 Feb 2020 08:06  Phos  3.5     02-10  Mg     1.8     02-10    TPro  5.7<L>  /  Alb  2.9<L>  /  TBili  1.4<H>  /  DBili  x   /  AST  237<H>  /  ALT  320<H>  /  AlkPhos  132<H>  02-09    PT/INR - ( 09 Feb 2020 03:11 )   PT: 40.9 sec;   INR: 3.51 ratio         PTT - ( 09 Feb 2020 03:11 )  PTT:26.8 sec    CAPILLARY BLOOD GLUCOSE          RADIOLOGY & ADDITIONAL TESTS:    Imaging Personally Reviewed:  [X ] YES  [ ] NO    Consultant(s) Notes Reviewed:  [ X] YES  [ ] NO    Care Discussed with Consultants/Other Providers [ X] YES  [ ] NO

## 2020-02-10 NOTE — DISCHARGE NOTE PROVIDER - NSDCCPCAREPLAN_GEN_ALL_CORE_FT
PRINCIPAL DISCHARGE DIAGNOSIS  Diagnosis: SVT (supraventricular tachycardia)  Assessment and Plan of Treatment: hanane will   need to follow up with your cardiologist now on cardizem 60 every four hours      SECONDARY DISCHARGE DIAGNOSES  Diagnosis: Pancreatic lesion  Assessment and Plan of Treatment: please follow up with GI    Diagnosis: Abdominal pain  Assessment and Plan of Treatment: resolving please follow up with GI PRINCIPAL DISCHARGE DIAGNOSIS  Diagnosis: SVT (supraventricular tachycardia)  Assessment and Plan of Treatment: hanane will   need to follow up with your cardiologist now on cardizem 60 every four hours      SECONDARY DISCHARGE DIAGNOSES  Diagnosis: Pancreatic lesion  Assessment and Plan of Treatment: Pt is a 65 y/o male w/PMH of asthma and 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 Presented to hospital with SVT.   CT revealed: fullness in the pancreatic head with nonspecific fluid in right anterior pararenal space, nonspecific. Differential: focal pancreatitis vs underlying pancreatic lesion. Thickening vs underdistention of rectum, correlate for proctitis.   Lipase wnl, , , CEA wnl. MRCP unremarkable (however study limited by lack of IV contrast)    Problem/Recommendation - 1:  Problem: Pancreatic lesion. Recommendation: **Would order MRI with contrast as outpatient to r/o malignancy  Discussed close GI f/u.   Problem/Recommendation - 2:  ·  Problem: Abdominal pain, unspecified abdominal location.  Recommendation: -PPI daily   -Simethicone   -Advance diet as needed   **Colonoscopy as outpatient discussed given rectal thickening on imaging, change in bowel habits.       Diagnosis: Abdominal pain  Assessment and Plan of Treatment: resolving please follow up with GI

## 2020-02-11 ENCOUNTER — TRANSCRIPTION ENCOUNTER (OUTPATIENT)
Age: 65
End: 2020-02-11

## 2020-02-11 VITALS
DIASTOLIC BLOOD PRESSURE: 81 MMHG | SYSTOLIC BLOOD PRESSURE: 117 MMHG | TEMPERATURE: 99 F | HEART RATE: 104 BPM | OXYGEN SATURATION: 95 % | RESPIRATION RATE: 17 BRPM

## 2020-02-11 DIAGNOSIS — R10.9 UNSPECIFIED ABDOMINAL PAIN: ICD-10-CM

## 2020-02-11 LAB
ALBUMIN SERPL ELPH-MCNC: 2.7 G/DL — LOW (ref 3.3–5)
ALP SERPL-CCNC: 117 U/L — SIGNIFICANT CHANGE UP (ref 40–120)
ALT FLD-CCNC: 276 U/L — HIGH (ref 12–78)
ANION GAP SERPL CALC-SCNC: 9 MMOL/L — SIGNIFICANT CHANGE UP (ref 5–17)
AST SERPL-CCNC: 152 U/L — HIGH (ref 15–37)
BILIRUB DIRECT SERPL-MCNC: 0.34 MG/DL — HIGH (ref 0.05–0.2)
BILIRUB INDIRECT FLD-MCNC: 0.7 MG/DL — SIGNIFICANT CHANGE UP (ref 0.2–1)
BILIRUB SERPL-MCNC: 1 MG/DL — SIGNIFICANT CHANGE UP (ref 0.2–1.2)
BUN SERPL-MCNC: 15 MG/DL — SIGNIFICANT CHANGE UP (ref 7–23)
CALCIUM SERPL-MCNC: 9 MG/DL — SIGNIFICANT CHANGE UP (ref 8.5–10.1)
CANCER AG125 SERPL-ACNC: 36 U/ML — SIGNIFICANT CHANGE UP
CANCER AG19-9 SERPL-ACNC: 27 U/ML — SIGNIFICANT CHANGE UP
CEA SERPL-MCNC: 0.7 NG/ML — SIGNIFICANT CHANGE UP (ref 0–3.8)
CHLORIDE SERPL-SCNC: 107 MMOL/L — SIGNIFICANT CHANGE UP (ref 96–108)
CO2 SERPL-SCNC: 26 MMOL/L — SIGNIFICANT CHANGE UP (ref 22–31)
CREAT SERPL-MCNC: 0.97 MG/DL — SIGNIFICANT CHANGE UP (ref 0.5–1.3)
GGT SERPL-CCNC: 187 U/L — HIGH (ref 9–50)
GLUCOSE SERPL-MCNC: 74 MG/DL — SIGNIFICANT CHANGE UP (ref 70–99)
HAV IGM SER-ACNC: SIGNIFICANT CHANGE UP
HBV CORE IGM SER-ACNC: SIGNIFICANT CHANGE UP
HBV SURFACE AG SER-ACNC: SIGNIFICANT CHANGE UP
HCT VFR BLD CALC: 38.4 % — LOW (ref 39–50)
HCV AB S/CO SERPL IA: 0.15 S/CO — SIGNIFICANT CHANGE UP (ref 0–0.99)
HCV AB SERPL-IMP: SIGNIFICANT CHANGE UP
HGB BLD-MCNC: 12.6 G/DL — LOW (ref 13–17)
INR BLD: 3.77 RATIO — HIGH (ref 0.88–1.16)
LIDOCAIN IGE QN: 120 U/L — SIGNIFICANT CHANGE UP (ref 73–393)
MCHC RBC-ENTMCNC: 27.5 PG — SIGNIFICANT CHANGE UP (ref 27–34)
MCHC RBC-ENTMCNC: 32.8 GM/DL — SIGNIFICANT CHANGE UP (ref 32–36)
MCV RBC AUTO: 83.8 FL — SIGNIFICANT CHANGE UP (ref 80–100)
NRBC # BLD: 0 /100 WBCS — SIGNIFICANT CHANGE UP (ref 0–0)
PLATELET # BLD AUTO: 113 K/UL — LOW (ref 150–400)
POTASSIUM SERPL-MCNC: 4.2 MMOL/L — SIGNIFICANT CHANGE UP (ref 3.5–5.3)
POTASSIUM SERPL-SCNC: 4.2 MMOL/L — SIGNIFICANT CHANGE UP (ref 3.5–5.3)
PROT SERPL-MCNC: 5.5 GM/DL — LOW (ref 6–8.3)
PROTHROM AB SERPL-ACNC: 44 SEC — HIGH (ref 10–12.9)
RBC # BLD: 4.58 M/UL — SIGNIFICANT CHANGE UP (ref 4.2–5.8)
RBC # FLD: 15.9 % — HIGH (ref 10.3–14.5)
SODIUM SERPL-SCNC: 142 MMOL/L — SIGNIFICANT CHANGE UP (ref 135–145)
WBC # BLD: 5.87 K/UL — SIGNIFICANT CHANGE UP (ref 3.8–10.5)
WBC # FLD AUTO: 5.87 K/UL — SIGNIFICANT CHANGE UP (ref 3.8–10.5)

## 2020-02-11 PROCEDURE — 99239 HOSP IP/OBS DSCHRG MGMT >30: CPT

## 2020-02-11 PROCEDURE — 74181 MRI ABDOMEN W/O CONTRAST: CPT | Mod: 26

## 2020-02-11 RX ORDER — DILTIAZEM HCL 120 MG
5 CAPSULE, EXT RELEASE 24 HR ORAL
Qty: 125 | Refills: 0 | Status: DISCONTINUED | OUTPATIENT
Start: 2020-02-11 | End: 2020-02-11

## 2020-02-11 RX ORDER — DILTIAZEM HCL 120 MG
15 CAPSULE, EXT RELEASE 24 HR ORAL ONCE
Refills: 0 | Status: COMPLETED | OUTPATIENT
Start: 2020-02-11 | End: 2020-02-11

## 2020-02-11 RX ORDER — DILTIAZEM HCL 120 MG
10 CAPSULE, EXT RELEASE 24 HR ORAL
Qty: 125 | Refills: 0 | Status: DISCONTINUED | OUTPATIENT
Start: 2020-02-11 | End: 2020-02-11

## 2020-02-11 RX ORDER — DILTIAZEM HCL 120 MG
1 CAPSULE, EXT RELEASE 24 HR ORAL
Qty: 120 | Refills: 0
Start: 2020-02-11 | End: 2020-03-11

## 2020-02-11 RX ORDER — SODIUM CHLORIDE 9 MG/ML
500 INJECTION INTRAMUSCULAR; INTRAVENOUS; SUBCUTANEOUS ONCE
Refills: 0 | Status: COMPLETED | OUTPATIENT
Start: 2020-02-11 | End: 2020-02-11

## 2020-02-11 RX ORDER — PANTOPRAZOLE SODIUM 20 MG/1
1 TABLET, DELAYED RELEASE ORAL
Qty: 30 | Refills: 0
Start: 2020-02-11 | End: 2020-03-11

## 2020-02-11 RX ORDER — SIMETHICONE 80 MG/1
1 TABLET, CHEWABLE ORAL
Qty: 90 | Refills: 0
Start: 2020-02-11 | End: 2020-03-11

## 2020-02-11 RX ORDER — DILTIAZEM HCL 120 MG
60 CAPSULE, EXT RELEASE 24 HR ORAL
Refills: 0 | Status: DISCONTINUED | OUTPATIENT
Start: 2020-02-11 | End: 2020-02-11

## 2020-02-11 RX ORDER — DILTIAZEM HCL 120 MG
90 CAPSULE, EXT RELEASE 24 HR ORAL ONCE
Refills: 0 | Status: COMPLETED | OUTPATIENT
Start: 2020-02-11 | End: 2020-02-11

## 2020-02-11 RX ADMIN — APIXABAN 5 MILLIGRAM(S): 2.5 TABLET, FILM COATED ORAL at 05:48

## 2020-02-11 RX ADMIN — Medication 1 TABLET(S): at 05:48

## 2020-02-11 RX ADMIN — INFLUENZA VIRUS VACCINE 0.5 MILLILITER(S): 15; 15; 15; 15 SUSPENSION INTRAMUSCULAR at 15:47

## 2020-02-11 RX ADMIN — Medication 81 MILLIGRAM(S): at 12:12

## 2020-02-11 RX ADMIN — PANTOPRAZOLE SODIUM 40 MILLIGRAM(S): 20 TABLET, DELAYED RELEASE ORAL at 05:48

## 2020-02-11 RX ADMIN — Medication 15 MILLIGRAM(S): at 00:07

## 2020-02-11 RX ADMIN — Medication 90 MILLIGRAM(S): at 08:55

## 2020-02-11 RX ADMIN — APIXABAN 5 MILLIGRAM(S): 2.5 TABLET, FILM COATED ORAL at 17:42

## 2020-02-11 RX ADMIN — SODIUM CHLORIDE 1000 MILLILITER(S): 9 INJECTION INTRAMUSCULAR; INTRAVENOUS; SUBCUTANEOUS at 00:05

## 2020-02-11 RX ADMIN — SIMETHICONE 80 MILLIGRAM(S): 80 TABLET, CHEWABLE ORAL at 13:23

## 2020-02-11 RX ADMIN — SIMETHICONE 80 MILLIGRAM(S): 80 TABLET, CHEWABLE ORAL at 05:48

## 2020-02-11 RX ADMIN — Medication 60 MILLIGRAM(S): at 17:42

## 2020-02-11 RX ADMIN — Medication 5 MG/HR: at 03:43

## 2020-02-11 RX ADMIN — Medication 10 MG/HR: at 05:49

## 2020-02-11 RX ADMIN — Medication 30 MILLIGRAM(S): at 05:48

## 2020-02-11 NOTE — CONSULT NOTE ADULT - PROBLEM SELECTOR RECOMMENDATION 2
-PPI daily   -Simethicone   -Advance diet as needed   **Colonoscopy as outpatient discussed given rectal thickening on imaging, change in bowel habits

## 2020-02-11 NOTE — CONSULT NOTE ADULT - ASSESSMENT
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 Differential: focal pancreatitis vs underlying pancreatic lesion. Thickening vs underdistention of rectum, correlate for proctitis.     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 Differential: focal pancreatitis vs underlying pancreatic lesion. Thickening vs underdistention of rectum, correlate for proctitis.     Lipase wnl, , , CEA wnl. 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 Presented to hospital with SVT.     CT revealed: fullness in the pancreatic head with nonspecific fluid in right anterior pararenal space, nonspecific. Differential: focal pancreatitis vs underlying pancreatic lesion. Thickening vs underdistention of rectum, correlate for proctitis.     Lipase wnl, , , CEA wnl. MRCP unremarkable (however study limited by lack of IV contrast)

## 2020-02-11 NOTE — CONSULT NOTE ADULT - SUBJECTIVE AND OBJECTIVE BOX
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  Pt reports gets episodes of abdominal bloating and feels constipated(not actually constipated) and takes laxatives prn, had similar episode and took miralax first w/o help then had 2 cups of prune juice w/5-6bm's and pt still felt bloated and abd discofort and also did feel some palpitations and decided to call ems.  pt denies any fever, chills, sob, cp, n/v/d/ no travels or sick contacts.  In ed pt was in svt to >200 received amio then afib 130's and ct abdomen w/?pancreatic lesion (2020 05:41). GI was consulted for further evaluation of pancreatic lesion noted on imaging. He currently reports improvement in constipation, with a normal formed bowel movement today. He states that bloating and abdominal discomfort has significantly improved since admission. Denies any previous colonoscopy. Denies any family history of GI malignancy, pancreatic cancer.       PMH/PSH:PAST MEDICAL & SURGICAL HISTORY:  Atrial fibrillation  Asthma  No significant past surgical history      Allergies:  No Known Allergies      Medications:  amoxicillin  875 milliGRAM(s)/clavulanate 1 Tablet(s) Oral two times a day  apixaban 5 milliGRAM(s) Oral every 12 hours  aspirin enteric coated 81 milliGRAM(s) Oral daily  diltiazem    Tablet 60 milliGRAM(s) Oral four times a day  influenza   Vaccine 0.5 milliLiter(s) IntraMuscular once  pantoprazole    Tablet 40 milliGRAM(s) Oral before breakfast  simethicone 80 milliGRAM(s) Chew three times a day      Review of Systems:    General:  No weight loss, fevers, chills, night sweats, fatigue,   Eyes:  Good vision, no reported pain  ENT:  No sore throat, pain, runny nose, dysphagia  CV:  No pain, palpitations, hypo/hypertension  Resp:  No dyspnea, cough, tachypnea, wheezing  GI:  as per HPI   :  No pain, bleeding, incontinence, nocturia  Muscle:  No pain, weakness  Breast:  No pain, abscess, mass, discharge  Neuro:  No weakness, tingling, memory problems  Psych:  No fatigue, insomnia, mood problems, depression  Endocrine:  No polyuria, polydipsia, cold/heat intolerance  Heme:  No petechiae, ecchymosis, easy bruisability  Skin:  No rash, tattoos, scars, edema    Relevant Family History:   FAMILY HISTORY:  No pertinent family history in first degree relatives      Relevant Social History: Alcohol ( -) , Tobacco ( -) , Illicit drugs (- )     Physical Exam:    Vital Signs:  Vital Signs Last 24 Hrs  T(C): 36.2 (2020 10:30), Max: 36.7 (10 Feb 2020 23:05)  T(F): 97.2 (2020 10:30), Max: 98 (10 Feb 2020 23:05)  HR: 89 (2020 10:30) (80 - 146)  BP: 108/76 (2020 10:30) (101/65 - 121/84)  BP(mean): --  RR: 17 (2020 10:30) (17 - 17)  SpO2: 95% (2020 10:30) (95% - 96%)  Daily     Daily Weight in k.9 (2020 05:11)    General:  Appears stated age, well-groomed, well-nourished, no distress  HEENT:  NC/AT,  conjunctivae clear and pink, no thyromegaly, nodules, adenopathy, no JVD, anicteric sclera  Chest:  Full & symmetric excursion, no increased effort, breath sounds clear  Cardiovascular:  Regular rhythm, S1, S2, no murmur/rub/S3/S4, no abdominal bruit, no edema  Abdomen:  Soft, non tender, non distended, normoactive bowel sounds,  no masses , no hepatosplenomegaly, no signs of chronic liver disease  Extremities:  no cyanosis, clubbing or edema  Skin:  No rash/erythema/ecchymoses/petechiae/wounds/abscess/warm/dry  Neuro/Psych:  Alert, oriented, no asterixis, no tremor, no encephalopathy    Laboratory:                          12.6   5.87  )-----------( 113      ( 2020 07:15 )             38.4     -11    142  |  107  |  15  ----------------------------<  74  4.2   |  26  |  0.97    Ca    9.0      2020 07:15  Phos  3.5     02-10  Mg     1.8     02-10    TPro  5.5<L>  /  Alb  2.7<L>  /  TBili  1.0  /  DBili  .34<H>  /  AST  152<H>  /  ALT  276<H>  /  AlkPhos  117  11    LIVER FUNCTIONS - ( 2020 12:41 )  Alb: x     / Pro: x     / ALK PHOS: x     / ALT: x     / AST: x     / GGT: 187 U/L       PT/INR - ( 2020 07:15 )   PT: 44.0 sec;   INR: 3.77 ratio             Amylase Serum--      Lipase [id number] U/L       Ammonia--      Intake and Output    02-10-20 @ 07:  -  20 @ 07:00  --------------------------------------------------------  IN: 900 mL / OUT: 0 mL / NET: 900 mL    20 @ 07:01  -  20 @ 15:06  --------------------------------------------------------  IN: 352 mL / OUT: 300 mL / NET: 52 mL        Imaging:  < from: CT Abdomen and Pelvis w/ IV Cont (20 @ 04:27) >    EXAM:  CT ABDOMEN AND PELVIS IC                            PROCEDURE DATE:  2020          INTERPRETATION:  CLINICAL INFORMATION: Abdominal pain    COMPARISON: None.    PROCEDURE:   CT of the Abdomen and Pelvis was performed with intravenous contrast.   Intravenous contrast: 96 ml Omnipaque 350. 4 ml discarded.  Oral contrast: None.  Sagittal and coronal reformats were performed.    FINDINGS:    LOWER CHEST: Trace right pleural effusion.    LIVER: Within normal limits.  BILE DUCTS: Normal caliber.  GALLBLADDER: Within normal limits.  SPLEEN: Within normal limits.  PANCREAS: Fullness and hypoattenuation in the pancreatic head with mild fluid in the right anterior para renal space..  ADRENALS: Within normal limits.  KIDNEYS/URETERS: Within normal limits.    BLADDER: Underdistended urinary bladder..  REPRODUCTIVE ORGANS: Prominent prostate gland.    BOWEL: Fluid-filled colon. There is thickening underdistention of the rectum. Redundant sigmoid colon. No bowel obstruction. Appendix is not visualized.  PERITONEUM: Mild perihepatic and trace perisplenic ascites..  VESSELS: Study was performed in the arterial phase which limits evaluation. Normal caliber abdominal aorta. Reflux of contrast into the hepatic veins..  RETROPERITONEUM/LYMPH NODES: No lymphadenopathy.    ABDOMINAL WALL: Within normal limits.  BONES: Within normal limits.    IMPRESSION:     Fullness in the region of the pancreatic head with nonspecific fluid in the right anterior para renal space. Findings are nonspecific. Differential includes focal pancreatitis versus underlying pancreatic lesion. Lipase level correlation is recommended with consideration for endoscopic ultrasound.    Thickening versus underdistention of the rectum. Correlate for proctitis. Fluid-filled colon.                ROBBI AGUILAR M.D., ATTENDING RADIOLOGIST  This document has been electronically signed. 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  Pt reports gets episodes of abdominal bloating and feels constipated(not actually constipated) and takes laxatives prn, had similar episode and took miralax first w/o help then had 2 cups of prune juice w/5-6bm's and pt still felt bloated and abd discofort and also did feel some palpitations and decided to call ems.  pt denies any fever, chills, sob, cp, n/v/d/ no travels or sick contacts.  In ed pt was in svt to >200 received amio then afib 130's and ct abdomen w/?pancreatic lesion (2020 05:41). GI was consulted for further evaluation of pancreatic lesion noted on imaging. He currently reports improvement in constipation, with a normal formed bowel movement today. He states that bloating and abdominal discomfort has significantly improved since admission. Denies any previous colonoscopy. Denies any family history of GI malignancy, pancreatic cancer.       PMH/PSH:PAST MEDICAL & SURGICAL HISTORY:  Atrial fibrillation  Asthma  No significant past surgical history      Allergies:  No Known Allergies      Medications:  amoxicillin  875 milliGRAM(s)/clavulanate 1 Tablet(s) Oral two times a day  apixaban 5 milliGRAM(s) Oral every 12 hours  aspirin enteric coated 81 milliGRAM(s) Oral daily  diltiazem    Tablet 60 milliGRAM(s) Oral four times a day  influenza   Vaccine 0.5 milliLiter(s) IntraMuscular once  pantoprazole    Tablet 40 milliGRAM(s) Oral before breakfast  simethicone 80 milliGRAM(s) Chew three times a day      Review of Systems:    General:  No weight loss, fevers, chills, night sweats, fatigue,   Eyes:  Good vision, no reported pain  ENT:  No sore throat, pain, runny nose, dysphagia  CV:  No pain, palpitations, hypo/hypertension  Resp:  No dyspnea, cough, tachypnea, wheezing  GI:  as per HPI   :  No pain, bleeding, incontinence, nocturia  Muscle:  No pain, weakness  Breast:  No pain, abscess, mass, discharge  Neuro:  No weakness, tingling, memory problems  Psych:  No fatigue, insomnia, mood problems, depression  Endocrine:  No polyuria, polydipsia, cold/heat intolerance  Heme:  No petechiae, ecchymosis, easy bruisability  Skin:  No rash, tattoos, scars, edema    Relevant Family History:   FAMILY HISTORY:  No pertinent family history in first degree relatives      Relevant Social History: Alcohol ( -) , Tobacco ( -) , Illicit drugs (- )     Physical Exam:    Vital Signs:  Vital Signs Last 24 Hrs  T(C): 36.2 (2020 10:30), Max: 36.7 (10 Feb 2020 23:05)  T(F): 97.2 (2020 10:30), Max: 98 (10 Feb 2020 23:05)  HR: 89 (2020 10:30) (80 - 146)  BP: 108/76 (2020 10:30) (101/65 - 121/84)  BP(mean): --  RR: 17 (2020 10:30) (17 - 17)  SpO2: 95% (2020 10:30) (95% - 96%)  Daily     Daily Weight in k.9 (2020 05:11)    General:  Appears stated age, well-groomed, well-nourished, no distress  HEENT:  NC/AT,  conjunctivae clear and pink, no thyromegaly, nodules, adenopathy, no JVD, anicteric sclera  Chest:  Full & symmetric excursion, no increased effort, breath sounds clear  Cardiovascular:  Regular rhythm, S1, S2, no murmur/rub/S3/S4, no abdominal bruit, no edema  Abdomen:  Soft, non tender, non distended, normoactive bowel sounds,  no masses , no hepatosplenomegaly, no signs of chronic liver disease  Extremities:  no cyanosis, clubbing or edema  Skin:  No rash/erythema/ecchymoses/petechiae/wounds/abscess/warm/dry  Neuro/Psych:  Alert, oriented, no asterixis, no tremor, no encephalopathy    Laboratory:                          12.6   5.87  )-----------( 113      ( 2020 07:15 )             38.4     -11    142  |  107  |  15  ----------------------------<  74  4.2   |  26  |  0.97    Ca    9.0      2020 07:15  Phos  3.5     02-10  Mg     1.8     02-10    TPro  5.5<L>  /  Alb  2.7<L>  /  TBili  1.0  /  DBili  .34<H>  /  AST  152<H>  /  ALT  276<H>  /  AlkPhos  117  11    LIVER FUNCTIONS - ( 2020 12:41 )  Alb: x     / Pro: x     / ALK PHOS: x     / ALT: x     / AST: x     / GGT: 187 U/L       PT/INR - ( 2020 07:15 )   PT: 44.0 sec;   INR: 3.77 ratio             Amylase Serum--      Lipase [id number] U/L       Ammonia--      Intake and Output    02-10-20 @ 07:  -  20 @ 07:00  --------------------------------------------------------  IN: 900 mL / OUT: 0 mL / NET: 900 mL    20 @ 07:01  -  20 @ 15:06  --------------------------------------------------------  IN: 352 mL / OUT: 300 mL / NET: 52 mL        Imaging:  < from: CT Abdomen and Pelvis w/ IV Cont (20 @ 04:27) >    EXAM:  CT ABDOMEN AND PELVIS IC                            PROCEDURE DATE:  2020          INTERPRETATION:  CLINICAL INFORMATION: Abdominal pain    COMPARISON: None.    PROCEDURE:   CT of the Abdomen and Pelvis was performed with intravenous contrast.   Intravenous contrast: 96 ml Omnipaque 350. 4 ml discarded.  Oral contrast: None.  Sagittal and coronal reformats were performed.    FINDINGS:    LOWER CHEST: Trace right pleural effusion.    LIVER: Within normal limits.  BILE DUCTS: Normal caliber.  GALLBLADDER: Within normal limits.  SPLEEN: Within normal limits.  PANCREAS: Fullness and hypoattenuation in the pancreatic head with mild fluid in the right anterior para renal space..  ADRENALS: Within normal limits.  KIDNEYS/URETERS: Within normal limits.    BLADDER: Underdistended urinary bladder..  REPRODUCTIVE ORGANS: Prominent prostate gland.    BOWEL: Fluid-filled colon. There is thickening underdistention of the rectum. Redundant sigmoid colon. No bowel obstruction. Appendix is not visualized.  PERITONEUM: Mild perihepatic and trace perisplenic ascites..  VESSELS: Study was performed in the arterial phase which limits evaluation. Normal caliber abdominal aorta. Reflux of contrast into the hepatic veins..  RETROPERITONEUM/LYMPH NODES: No lymphadenopathy.    ABDOMINAL WALL: Within normal limits.  BONES: Within normal limits.    IMPRESSION:     Fullness in the region of the pancreatic head with nonspecific fluid in the right anterior para renal space. Findings are nonspecific. Differential includes focal pancreatitis versus underlying pancreatic lesion. Lipase level correlation is recommended with consideration for endoscopic ultrasound.    Thickening versus underdistention of the rectum. Correlate for proctitis. Fluid-filled colon.                ROBBI AGUILAR M.D., ATTENDING RADIOLOGIST  This document has been electronically signed. 2020  4:47AM                < end of copied text >    < from: MR MRCP No Cont (20 @ 09:47) >    EXAM:  MR MRCP                            PROCEDURE DATE:  2020          INTERPRETATION:  CLINICAL INFORMATION: Pancreatic head fullness on CT    COMPARISON: CT dated 2020    PROCEDURE:   MRI of the abdomen was performed without intravenous contrast.  IV Contrast: None. 0 cc administered, 0 cc discarded.  MRCP was performed.    FINDINGS:    Multiple sequences are degraded by motion.    LOWER CHEST: Small bilateral pleural effusions.    LIVER: Within normal limits.   BILE DUCTS: Normalcaliber. CBD 6 mm. No choledocholithiasis.  GALLBLADDER: No cholelithiasis. Nonspecific gallbladder wall thickening and edema.  SPLEEN: Within normal limits.  PANCREAS: No ductal dilatation. No discrete mass.  ADRENALS: Within normal limits.  KIDNEYS/URETERS: Small renal cysts measuring up to 6 mm. No hydronephrosis.    VISUALIZED PORTIONS:    BOWEL: Within normal limits.   PERITONEUM: Mild ascites.  VESSELS: Atherosclerotic changes.  RETROPERITONEUM/LYMPH NODES: No lymphadenopathy.    ABDOMINAL WALL: Soft tissue edema.  BONES: Degenerative changes.    IMPRESSION:     Study limited by motion and lack of intravenous contrast.  Small bilateral pleural effusions.  No cholelithiasis, choledocholithiasis, or biliary ductal dilatation.  Nonspecificgallbladder wall thickening/edema, question hepatitis.  No discrete pancreatic mass identified (within limitations of noncontrast study). No pancreatic ductal dilatation.      STEFANO GODOY M.D., ATTENDING RADIOLOGIST  This document has beenelectronically signed. 2020 10:18AM

## 2020-02-11 NOTE — DISCHARGE NOTE NURSING/CASE MANAGEMENT/SOCIAL WORK - PATIENT PORTAL LINK FT
You can access the FollowMyHealth Patient Portal offered by [facility] by registering at the following website: [email or web address]. By joining [employer]’s FollowMyHealth portal, you will also be able to view your health information using other applications (apps) compatible with our system.

## 2020-02-11 NOTE — CONSULT NOTE ADULT - PROBLEM SELECTOR RECOMMENDATION 9
-MRI Pancreas with/without contrast to evaluate pancreatic head; pending clinical status can be performed as outpatient   -CA 19-9 **Would order MRI with contrast as outpatient to r/o malignancy  Discussed close GI f/u

## 2020-02-16 DIAGNOSIS — D64.9 ANEMIA, UNSPECIFIED: ICD-10-CM

## 2020-02-16 DIAGNOSIS — R79.1 ABNORMAL COAGULATION PROFILE: ICD-10-CM

## 2020-02-16 DIAGNOSIS — I47.1 SUPRAVENTRICULAR TACHYCARDIA: ICD-10-CM

## 2020-02-16 DIAGNOSIS — N17.9 ACUTE KIDNEY FAILURE, UNSPECIFIED: ICD-10-CM

## 2020-02-16 DIAGNOSIS — J45.20 MILD INTERMITTENT ASTHMA, UNCOMPLICATED: ICD-10-CM

## 2020-02-16 DIAGNOSIS — K86.9 DISEASE OF PANCREAS, UNSPECIFIED: ICD-10-CM

## 2020-02-16 DIAGNOSIS — Z79.01 LONG TERM (CURRENT) USE OF ANTICOAGULANTS: ICD-10-CM

## 2020-02-16 DIAGNOSIS — I48.11 LONGSTANDING PERSISTENT ATRIAL FIBRILLATION: ICD-10-CM

## 2021-05-21 NOTE — H&P ADULT - NSHPLABSRESULTS_GEN_ALL_CORE
TREATMENT PLAN (Medication Management Only)        [facility]    Name and Date of Birth:  Ilda Medina 6 y o  2010  Date of Treatment Plan: May 21, 2021  Diagnosis/Diagnoses:    1  Attention deficit hyperactivity disorder (ADHD), combined type, moderate    2  Oppositional defiant disorder    3  Depression, unspecified depression type    4  Anxiety      Strengths/Personal Resources for Self-Care: supportive family, taking medications as prescribed, ability to communicate needs, ability to listen, ability to reason  Area/Areas of need (in own words): anxiety symptoms, ADHD symptoms, behavioral problems  1  Long Term Goal: improve anxiety, improve anger control, improve ADHD symptoms  Target Date: 1 year - 5/21/2022  Person/Persons responsible for completion of goal: SHIRA Ch   2   Short Term Objective (s) - How will we reach this goal?:   A  Provider new recommended medication/dosage changes and/or continue medication(s): continue current medications as prescribed  B   Continue individual therapy  Target Date: 3 months - 8/21/2021  Person/Persons Responsible for Completion of Goal: SHIRA Ch  Progress Towards Goals: continuing treatment  Treatment Modality: medication management every 3 months  Review due 6 months from date of this plan: 6 months - 11/21/2021  Expected length of service: maintenance unless revised  My Physician/PA/NP and I have developed this plan together and I agree to work on the goals and objectives  I understand the treatment goals that were developed for my treatment      Treatment Plan done but not signed at time of office visit due to:  Plan reviewed by phone or in person  and verbal consent given due to [place] social distancing
LABS:                        15.6   5.02  )-----------( 166      ( 20 Feb 2019 17:02 )             48.4     20 Feb 2019 17:02    139    |  102    |  12     ----------------------------<  124    3.6     |  26     |  1.63     Ca    8.7        20 Feb 2019 17:02    TPro  6.7    /  Alb  3.3    /  TBili  0.9    /  DBili  x      /  AST  130    /  ALT  160    /  AlkPhos  116    20 Feb 2019 17:02    PT/INR - ( 20 Feb 2019 17:02 )   PT: 32.8 sec;   INR: 2.84 ratio         PTT - ( 20 Feb 2019 17:02 )  PTT:34.8 sec  CAPILLARY BLOOD GLUCOSE        BLOOD CULTURE    RADIOLOGY & ADDITIONAL TESTS:    Imaging Personally Reviewed:  [ ] YES

## 2021-07-12 ENCOUNTER — EMERGENCY (EMERGENCY)
Facility: HOSPITAL | Age: 66
LOS: 1 days | Discharge: HOME | End: 2021-07-12
Attending: STUDENT IN AN ORGANIZED HEALTH CARE EDUCATION/TRAINING PROGRAM | Admitting: STUDENT IN AN ORGANIZED HEALTH CARE EDUCATION/TRAINING PROGRAM
Payer: COMMERCIAL

## 2021-07-12 VITALS
TEMPERATURE: 98 F | OXYGEN SATURATION: 100 % | DIASTOLIC BLOOD PRESSURE: 101 MMHG | SYSTOLIC BLOOD PRESSURE: 140 MMHG | RESPIRATION RATE: 19 BRPM | HEART RATE: 79 BPM

## 2021-07-12 VITALS
SYSTOLIC BLOOD PRESSURE: 119 MMHG | DIASTOLIC BLOOD PRESSURE: 102 MMHG | OXYGEN SATURATION: 100 % | RESPIRATION RATE: 18 BRPM | TEMPERATURE: 98 F | HEART RATE: 80 BPM

## 2021-07-12 LAB
ALBUMIN SERPL ELPH-MCNC: 3.8 G/DL — SIGNIFICANT CHANGE UP (ref 3.3–5)
ALBUMIN SERPL ELPH-MCNC: 4.5 G/DL — SIGNIFICANT CHANGE UP (ref 3.3–5)
ALP SERPL-CCNC: 122 U/L — HIGH (ref 40–120)
ALP SERPL-CCNC: 125 U/L — HIGH (ref 40–120)
ALT FLD-CCNC: 171 U/L — HIGH (ref 4–41)
ALT FLD-CCNC: 192 U/L — HIGH (ref 4–41)
ANION GAP SERPL CALC-SCNC: 12 MMOL/L — SIGNIFICANT CHANGE UP (ref 7–14)
ANION GAP SERPL CALC-SCNC: 14 MMOL/L — SIGNIFICANT CHANGE UP (ref 7–14)
AST SERPL-CCNC: 226 U/L — HIGH (ref 4–40)
AST SERPL-CCNC: 228 U/L — HIGH (ref 4–40)
BASOPHILS # BLD AUTO: 0.05 K/UL — SIGNIFICANT CHANGE UP (ref 0–0.2)
BASOPHILS NFR BLD AUTO: 1.2 % — SIGNIFICANT CHANGE UP (ref 0–2)
BILIRUB SERPL-MCNC: 1.2 MG/DL — SIGNIFICANT CHANGE UP (ref 0.2–1.2)
BILIRUB SERPL-MCNC: 1.4 MG/DL — HIGH (ref 0.2–1.2)
BLOOD GAS VENOUS COMPREHENSIVE RESULT: SIGNIFICANT CHANGE UP
BLOOD GAS VENOUS COMPREHENSIVE RESULT: SIGNIFICANT CHANGE UP
BUN SERPL-MCNC: 13 MG/DL — SIGNIFICANT CHANGE UP (ref 7–23)
BUN SERPL-MCNC: 15 MG/DL — SIGNIFICANT CHANGE UP (ref 7–23)
CALCIUM SERPL-MCNC: 9.4 MG/DL — SIGNIFICANT CHANGE UP (ref 8.4–10.5)
CALCIUM SERPL-MCNC: 9.7 MG/DL — SIGNIFICANT CHANGE UP (ref 8.4–10.5)
CHLORIDE SERPL-SCNC: 103 MMOL/L — SIGNIFICANT CHANGE UP (ref 98–107)
CHLORIDE SERPL-SCNC: 99 MMOL/L — SIGNIFICANT CHANGE UP (ref 98–107)
CK MB CFR SERPL CALC: 4.7 NG/ML — SIGNIFICANT CHANGE UP
CO2 SERPL-SCNC: 25 MMOL/L — SIGNIFICANT CHANGE UP (ref 22–31)
CO2 SERPL-SCNC: 25 MMOL/L — SIGNIFICANT CHANGE UP (ref 22–31)
CREAT SERPL-MCNC: 1.21 MG/DL — SIGNIFICANT CHANGE UP (ref 0.5–1.3)
CREAT SERPL-MCNC: 1.45 MG/DL — HIGH (ref 0.5–1.3)
EOSINOPHIL # BLD AUTO: 0.21 K/UL — SIGNIFICANT CHANGE UP (ref 0–0.5)
EOSINOPHIL NFR BLD AUTO: 5.2 % — SIGNIFICANT CHANGE UP (ref 0–6)
GLUCOSE SERPL-MCNC: 118 MG/DL — HIGH (ref 70–99)
GLUCOSE SERPL-MCNC: 96 MG/DL — SIGNIFICANT CHANGE UP (ref 70–99)
HCT VFR BLD CALC: 46.8 % — SIGNIFICANT CHANGE UP (ref 39–50)
HGB BLD-MCNC: 15 G/DL — SIGNIFICANT CHANGE UP (ref 13–17)
IANC: 2.32 K/UL — SIGNIFICANT CHANGE UP (ref 1.5–8.5)
IMM GRANULOCYTES NFR BLD AUTO: 0.2 % — SIGNIFICANT CHANGE UP (ref 0–1.5)
LYMPHOCYTES # BLD AUTO: 1.12 K/UL — SIGNIFICANT CHANGE UP (ref 1–3.3)
LYMPHOCYTES # BLD AUTO: 27.7 % — SIGNIFICANT CHANGE UP (ref 13–44)
MAGNESIUM SERPL-MCNC: 1.6 MG/DL — SIGNIFICANT CHANGE UP (ref 1.6–2.6)
MCHC RBC-ENTMCNC: 28 PG — SIGNIFICANT CHANGE UP (ref 27–34)
MCHC RBC-ENTMCNC: 32.1 GM/DL — SIGNIFICANT CHANGE UP (ref 32–36)
MCV RBC AUTO: 87.5 FL — SIGNIFICANT CHANGE UP (ref 80–100)
MONOCYTES # BLD AUTO: 0.33 K/UL — SIGNIFICANT CHANGE UP (ref 0–0.9)
MONOCYTES NFR BLD AUTO: 8.2 % — SIGNIFICANT CHANGE UP (ref 2–14)
NEUTROPHILS # BLD AUTO: 2.32 K/UL — SIGNIFICANT CHANGE UP (ref 1.8–7.4)
NEUTROPHILS NFR BLD AUTO: 57.5 % — SIGNIFICANT CHANGE UP (ref 43–77)
NRBC # BLD: 0 /100 WBCS — SIGNIFICANT CHANGE UP
NRBC # FLD: 0 K/UL — SIGNIFICANT CHANGE UP
NT-PROBNP SERPL-SCNC: 2498 PG/ML — HIGH
PHOSPHATE SERPL-MCNC: 2.9 MG/DL — SIGNIFICANT CHANGE UP (ref 2.5–4.5)
PLATELET # BLD AUTO: 114 K/UL — LOW (ref 150–400)
POTASSIUM SERPL-MCNC: 5.3 MMOL/L — SIGNIFICANT CHANGE UP (ref 3.5–5.3)
POTASSIUM SERPL-MCNC: 5.6 MMOL/L — HIGH (ref 3.5–5.3)
POTASSIUM SERPL-SCNC: 5.3 MMOL/L — SIGNIFICANT CHANGE UP (ref 3.5–5.3)
POTASSIUM SERPL-SCNC: 5.6 MMOL/L — HIGH (ref 3.5–5.3)
PROT SERPL-MCNC: 6.1 G/DL — SIGNIFICANT CHANGE UP (ref 6–8.3)
PROT SERPL-MCNC: 7.2 G/DL — SIGNIFICANT CHANGE UP (ref 6–8.3)
RBC # BLD: 5.35 M/UL — SIGNIFICANT CHANGE UP (ref 4.2–5.8)
RBC # FLD: 15.7 % — HIGH (ref 10.3–14.5)
SODIUM SERPL-SCNC: 138 MMOL/L — SIGNIFICANT CHANGE UP (ref 135–145)
SODIUM SERPL-SCNC: 140 MMOL/L — SIGNIFICANT CHANGE UP (ref 135–145)
TROPONIN T, HIGH SENSITIVITY RESULT: 21 NG/L — SIGNIFICANT CHANGE UP
TROPONIN T, HIGH SENSITIVITY RESULT: 21 NG/L — SIGNIFICANT CHANGE UP
WBC # BLD: 4.04 K/UL — SIGNIFICANT CHANGE UP (ref 3.8–10.5)
WBC # FLD AUTO: 4.04 K/UL — SIGNIFICANT CHANGE UP (ref 3.8–10.5)

## 2021-07-12 PROCEDURE — 71045 X-RAY EXAM CHEST 1 VIEW: CPT | Mod: 26

## 2021-07-12 PROCEDURE — 99285 EMERGENCY DEPT VISIT HI MDM: CPT | Mod: 25

## 2021-07-12 PROCEDURE — 93010 ELECTROCARDIOGRAM REPORT: CPT

## 2021-07-12 RX ORDER — RIVAROXABAN 15 MG-20MG
1 KIT ORAL
Qty: 14 | Refills: 0
Start: 2021-07-12 | End: 2021-07-25

## 2021-07-12 RX ORDER — ALBUTEROL 90 UG/1
2.5 AEROSOL, METERED ORAL ONCE
Refills: 0 | Status: COMPLETED | OUTPATIENT
Start: 2021-07-12 | End: 2021-07-12

## 2021-07-12 RX ORDER — AMOXICILLIN 250 MG/5ML
500 SUSPENSION, RECONSTITUTED, ORAL (ML) ORAL ONCE
Refills: 0 | Status: COMPLETED | OUTPATIENT
Start: 2021-07-12 | End: 2021-07-12

## 2021-07-12 RX ORDER — AMOXICILLIN 250 MG/5ML
1 SUSPENSION, RECONSTITUTED, ORAL (ML) ORAL
Qty: 21 | Refills: 0
Start: 2021-07-12 | End: 2021-07-18

## 2021-07-12 RX ORDER — RIVAROXABAN 15 MG-20MG
20 KIT ORAL ONCE
Refills: 0 | Status: COMPLETED | OUTPATIENT
Start: 2021-07-12 | End: 2021-07-12

## 2021-07-12 RX ORDER — SODIUM CHLORIDE 9 MG/ML
1000 INJECTION, SOLUTION INTRAVENOUS ONCE
Refills: 0 | Status: COMPLETED | OUTPATIENT
Start: 2021-07-12 | End: 2021-07-12

## 2021-07-12 RX ADMIN — SODIUM CHLORIDE 1000 MILLILITER(S): 9 INJECTION, SOLUTION INTRAVENOUS at 18:15

## 2021-07-12 RX ADMIN — SODIUM CHLORIDE 1000 MILLILITER(S): 9 INJECTION, SOLUTION INTRAVENOUS at 23:35

## 2021-07-12 RX ADMIN — RIVAROXABAN 20 MILLIGRAM(S): KIT at 23:47

## 2021-07-12 RX ADMIN — ALBUTEROL 2.5 MILLIGRAM(S): 90 AEROSOL, METERED ORAL at 20:52

## 2021-07-12 RX ADMIN — Medication 500 MILLIGRAM(S): at 21:38

## 2021-07-12 NOTE — ED PROVIDER NOTE - PROGRESS NOTE DETAILS
Jabier Cole, PGY3: LFTs elevated with unknown cause. Patient advised to follow up with PMD for repeat labs. Will start on Amox for RUL PNA. Will also start Xarelto for AFib after recommendation by primary cardiologist. Patient to follow up in office tomorrow. Discussed return precautions and all questions answered. Pt in agreement w/ plan. CAOx3, NAD, VSS. Stable for d/c. Jabier Cole, PGY3: D/w patient's cardiologist who will see patient in office tomorrow. Recommends starting Xarelto and will d/w patient regarding DAPT. Jabier Cole, PGY3: LFTs elevated with unknown cause. Elevated lactate likely in setting of Albuterol administration in ED. Patient advised to follow up with PMD for repeat labs. Will start on Amox for RUL PNA. Will also start Xarelto for AFib after recommendation by primary cardiologist. Patient to follow up in office tomorrow. Discussed return precautions and all questions answered. Pt in agreement w/ plan. CAOx3, NAD, VSS. Stable for d/c.

## 2021-07-12 NOTE — ED ADULT TRIAGE NOTE - CHIEF COMPLAINT QUOTE
Pt experiencing palpitations intermittent since yesterday, has h/o ablation on aspirin and Plavix,, was found to be in SVT with EMS, pt was then cardioverted and heart rhythm returned to NSR. Pt brought back directly to [facility].

## 2021-07-12 NOTE — ED PROVIDER NOTE - NS ED ROS FT
GENERAL: no fever, no chills  EYES: no change in vision, no irritation, no discharge, no redness, no pain  HEENT: no trouble swallowing or speaking, no throat pain, no ear pain  CARDIAC: no chest pain, +palpitations   PULMONARY: no cough, no shortness of breath, no wheezing  GI: no abdominal pain, no nausea, no vomiting, no diarrhea, no constipation  : no changes in urination, no dysuria  SKIN: no rashes  NEURO: no headache, no numbness, no weakness  MSK: no joint pain, no muscle pain, no back pain, no calf pain

## 2021-07-12 NOTE — ED PROVIDER NOTE - OBJECTIVE STATEMENT
65 year old male PMH Afib on ASA/Plavix, Asthma, BIBEMS for palpitations. Patient states symptoms started yesterday. He was found by EMS to be diaphoretic with unobtainable BP. HR was 220 upon arrival. No CP or SOB at the time. Patient was cardioverted at 100J by EMS into sinus rhythm. He currently denies complaints. Last had ablation 2019 for a-fib. Regularly follows w/ cardiology from [facility]. Presently denies HA, visual changes, cp, sob, abd pain, n/v, back pain, or weakness. 65 year old male PMH Afib on ASA/Plavix, Asthma, BIBEMS for palpitations. Patient states symptoms started yesterday. He was found by EMS to be diaphoretic with unobtainable BP. HR was 220 upon arrival. No CP or SOB at the time. Patient was cardioverted at 100J by EMS into sinus rhythm. He currently denies complaints. Last had ablation 2019 for a-fib. Regularly follows w/ cardiology from [facility]. Presently denies HA, visual changes, cp, sob, abd pain, n/v, back pain, or weakness.

## 2021-07-12 NOTE — ED PROVIDER NOTE - PROVIDER TOKENS
PROVIDER:[TOKEN:[[id number]],FOLLOWUP:[Urgent],ESTABLISHEDPATIENT:[T]] The history, relevant review of systems, past medical and surgical history, medical decision making, and physical examination was documented by the scribe in my presence and I attest to the accuracy of the documentation.

## 2021-07-12 NOTE — ED PROVIDER NOTE - PHYSICAL EXAMINATION
GENERAL: A&Ox3, non-toxic appearing, no acute distress  HEENT: NCAT, EOMI, oral mucosa moist, normal conjunctiva  RESP: CTAB, no respiratory distress, no wheezes/rhonchi/rales, speaking in full sentences  CV: irregularly irregular, no murmurs/rubs/gallops  ABDOMEN: soft, non-tender, non-distended, no guarding  MSK: no visible deformities  NEURO: no focal sensory or motor deficits, CN 2-12 grossly intact  SKIN: warm, normal color, well perfused, no rash  PSYCH: normal affect

## 2021-07-12 NOTE — ED ADULT NURSE NOTE - OBJECTIVE STATEMENT
Pt h/o ablation in the past on aspirin and Plavix, brought in by EMS after experiencing palpitations starting yesterday, pt was cardioverted with EMS and heart rhythm regulated to NSR, pt denies any present pain, abd soft, non-tender, non-distended, skin is cool dry and intact, ivl placed 20g ivl to Lt forearm, labs collected and sent, report given to primary RN. Will continue to monitor.

## 2021-07-12 NOTE — ED PROVIDER NOTE - CARE PROVIDER_API CALL
Jatinder Ha  CARDIOVASCULAR DISEASE  [address] [place] [address]  Phone: [phone]  Fax: [phone]  Established Patient  Follow Up Time: Urgent

## 2021-07-12 NOTE — ED ADULT NURSE NOTE - CHIEF COMPLAINT QUOTE
Pt experiencing palpitations intermittent since yesterday, has h/o ablation on aspirin and Plavix,, was found to be in SVT with EMS, pt was then cardioverted and heart rhythm returned to NSR. Pt brought back directly to [facility].

## 2021-07-12 NOTE — ED PROVIDER NOTE - PATIENT PORTAL LINK FT
You can access the FollowMyHealth Patient Portal offered by [facility] by registering at the following website: [email or web address]. By joining [employer]’s FollowMyHealth portal, you will also be able to view your health information using other applications (apps) compatible with our system.

## 2021-07-12 NOTE — ED PROVIDER NOTE - CLINICAL SUMMARY MEDICAL DECISION MAKING FREE TEXT BOX
Jabier Cole, PGY3: 65 year old male p/w palpitations x1 day. Found to be diaphoretic w/ unobtainable BP and HR 220s by EMS, cardioverted at 100J to sinus rhytrhm. Patient denies cp, sob, n/v. No clear inciting event, no infectious sx, no prior svt/BETH but was cardioverted in 2019. Plan for ekg, labs, tele monitoring, d/w cardiology. Possible d/c if patient has close follow up. Jabier Cole, PGY3: 65 year old male p/w palpitations x1 day. Found to be diaphoretic w/ unobtainable BP and HR 220s by EMS, cardioverted at 100J to sinus rhythm. Patient denies cp, sob, n/v. No clear inciting event, no infectious sx, no prior svt/BETH but was cardioverted in 2019. Plan for ekg, labs, tele monitoring, d/w cardiology. Possible d/c if patient has close follow up.

## 2021-07-12 NOTE — ED PROVIDER NOTE - NSFOLLOWUPINSTRUCTIONS_ED_ALL_ED_FT
Please follow up tomorrow in the office with Dr. Ha. Take Xarelto as prescribed. Continue taking all other medications you are prescribed, including Aspirin and Plavix.     Please  your antibiotics from the pharmacy and take them as prescribed. Continue taking until finished, even if you feel completely better. Inform your primary doctor if you experience rash, shortness of breath, abdominal pain, or diarrhea.     Please follow up with your primary care doctor in 3-5 days for repeat liver function testing. Bring a copy of your results with you to your appointment.     Rest, drink plenty of fluids.  Advance activity as tolerated.  Continue all previously prescribed medications as directed.  Follow up with your primary care physician in 48-72 hours- bring copies of your results.  Return to the ER for worsening or persistent symptoms, and/or ANY NEW OR CONCERNING SYMPTOMS. If you have issues obtaining follow up, please call: [phone] to obtain a doctor or specialist who takes your insurance in your area.

## 2021-07-13 ENCOUNTER — INPATIENT (INPATIENT)
Facility: HOSPITAL | Age: 66
LOS: 1 days | Discharge: ANOTHER HOSPITAL | End: 2021-07-15
Attending: INTERNAL MEDICINE | Admitting: INTERNAL MEDICINE
Payer: COMMERCIAL

## 2021-07-13 VITALS
TEMPERATURE: 98 F | WEIGHT: 173.94 LBS | HEIGHT: 71 IN | OXYGEN SATURATION: 95 % | SYSTOLIC BLOOD PRESSURE: 105 MMHG | HEART RATE: 94 BPM | DIASTOLIC BLOOD PRESSURE: 68 MMHG | RESPIRATION RATE: 18 BRPM

## 2021-07-13 DIAGNOSIS — I10 ESSENTIAL (PRIMARY) HYPERTENSION: ICD-10-CM

## 2021-07-13 DIAGNOSIS — R77.8 OTHER SPECIFIED ABNORMALITIES OF PLASMA PROTEINS: ICD-10-CM

## 2021-07-13 DIAGNOSIS — I48.20 CHRONIC ATRIAL FIBRILLATION, UNSPECIFIED: ICD-10-CM

## 2021-07-13 LAB
ALBUMIN SERPL ELPH-MCNC: 3.3 G/DL — SIGNIFICANT CHANGE UP (ref 3.3–5)
ALP SERPL-CCNC: 103 U/L — SIGNIFICANT CHANGE UP (ref 40–120)
ALT FLD-CCNC: 157 U/L — HIGH (ref 12–78)
ANION GAP SERPL CALC-SCNC: 6 MMOL/L — SIGNIFICANT CHANGE UP (ref 5–17)
APPEARANCE UR: CLEAR — SIGNIFICANT CHANGE UP
AST SERPL-CCNC: 120 U/L — HIGH (ref 15–37)
BACTERIA # UR AUTO: ABNORMAL
BASOPHILS # BLD AUTO: 0.03 K/UL — SIGNIFICANT CHANGE UP (ref 0–0.2)
BASOPHILS NFR BLD AUTO: 0.7 % — SIGNIFICANT CHANGE UP (ref 0–2)
BILIRUB SERPL-MCNC: 1.4 MG/DL — HIGH (ref 0.2–1.2)
BILIRUB UR-MCNC: NEGATIVE — SIGNIFICANT CHANGE UP
BUN SERPL-MCNC: 13 MG/DL — SIGNIFICANT CHANGE UP (ref 7–23)
CALCIUM SERPL-MCNC: 8.5 MG/DL — SIGNIFICANT CHANGE UP (ref 8.5–10.1)
CHLORIDE SERPL-SCNC: 102 MMOL/L — SIGNIFICANT CHANGE UP (ref 96–108)
CO2 SERPL-SCNC: 32 MMOL/L — HIGH (ref 22–31)
COLOR SPEC: YELLOW — SIGNIFICANT CHANGE UP
CREAT SERPL-MCNC: 1.28 MG/DL — SIGNIFICANT CHANGE UP (ref 0.5–1.3)
DIFF PNL FLD: ABNORMAL
EOSINOPHIL # BLD AUTO: 0.11 K/UL — SIGNIFICANT CHANGE UP (ref 0–0.5)
EOSINOPHIL NFR BLD AUTO: 2.5 % — SIGNIFICANT CHANGE UP (ref 0–6)
EPI CELLS # UR: SIGNIFICANT CHANGE UP
GLUCOSE SERPL-MCNC: 134 MG/DL — HIGH (ref 70–99)
GLUCOSE UR QL: NEGATIVE MG/DL — SIGNIFICANT CHANGE UP
HCT VFR BLD CALC: 44.5 % — SIGNIFICANT CHANGE UP (ref 39–50)
HGB BLD-MCNC: 14.7 G/DL — SIGNIFICANT CHANGE UP (ref 13–17)
IMM GRANULOCYTES NFR BLD AUTO: 0 % — SIGNIFICANT CHANGE UP (ref 0–1.5)
KETONES UR-MCNC: NEGATIVE — SIGNIFICANT CHANGE UP
LEUKOCYTE ESTERASE UR-ACNC: NEGATIVE — SIGNIFICANT CHANGE UP
LIDOCAIN IGE QN: 63 U/L — LOW (ref 73–393)
LYMPHOCYTES # BLD AUTO: 0.91 K/UL — LOW (ref 1–3.3)
LYMPHOCYTES # BLD AUTO: 21.1 % — SIGNIFICANT CHANGE UP (ref 13–44)
MAGNESIUM SERPL-MCNC: 1.6 MG/DL — SIGNIFICANT CHANGE UP (ref 1.6–2.6)
MCHC RBC-ENTMCNC: 28.5 PG — SIGNIFICANT CHANGE UP (ref 27–34)
MCHC RBC-ENTMCNC: 33 GM/DL — SIGNIFICANT CHANGE UP (ref 32–36)
MCV RBC AUTO: 86.2 FL — SIGNIFICANT CHANGE UP (ref 80–100)
MONOCYTES # BLD AUTO: 0.45 K/UL — SIGNIFICANT CHANGE UP (ref 0–0.9)
MONOCYTES NFR BLD AUTO: 10.4 % — SIGNIFICANT CHANGE UP (ref 2–14)
NEUTROPHILS # BLD AUTO: 2.82 K/UL — SIGNIFICANT CHANGE UP (ref 1.8–7.4)
NEUTROPHILS NFR BLD AUTO: 65.3 % — SIGNIFICANT CHANGE UP (ref 43–77)
NITRITE UR-MCNC: NEGATIVE — SIGNIFICANT CHANGE UP
NRBC # BLD: 0 /100 WBCS — SIGNIFICANT CHANGE UP (ref 0–0)
NT-PROBNP SERPL-SCNC: 3282 PG/ML — HIGH (ref 0–125)
PH UR: 6 — SIGNIFICANT CHANGE UP (ref 5–8)
PLATELET # BLD AUTO: 123 K/UL — LOW (ref 150–400)
POTASSIUM SERPL-MCNC: 3.8 MMOL/L — SIGNIFICANT CHANGE UP (ref 3.5–5.3)
POTASSIUM SERPL-SCNC: 3.8 MMOL/L — SIGNIFICANT CHANGE UP (ref 3.5–5.3)
PROT SERPL-MCNC: 6.3 GM/DL — SIGNIFICANT CHANGE UP (ref 6–8.3)
PROT UR-MCNC: 15 MG/DL
RAPID RVP RESULT: SIGNIFICANT CHANGE UP
RBC # BLD: 5.16 M/UL — SIGNIFICANT CHANGE UP (ref 4.2–5.8)
RBC # FLD: 15.6 % — HIGH (ref 10.3–14.5)
RBC CASTS # UR COMP ASSIST: SIGNIFICANT CHANGE UP /HPF (ref 0–4)
SARS-COV-2 RNA SPEC QL NAA+PROBE: SIGNIFICANT CHANGE UP
SODIUM SERPL-SCNC: 140 MMOL/L — SIGNIFICANT CHANGE UP (ref 135–145)
SP GR SPEC: 1.01 — SIGNIFICANT CHANGE UP (ref 1.01–1.02)
TROPONIN I SERPL-MCNC: 0.08 NG/ML — HIGH (ref 0.01–0.04)
UROBILINOGEN FLD QL: NEGATIVE MG/DL — SIGNIFICANT CHANGE UP
WBC # BLD: 4.32 K/UL — SIGNIFICANT CHANGE UP (ref 3.8–10.5)
WBC # FLD AUTO: 4.32 K/UL — SIGNIFICANT CHANGE UP (ref 3.8–10.5)
WBC UR QL: SIGNIFICANT CHANGE UP

## 2021-07-13 PROCEDURE — 93010 ELECTROCARDIOGRAM REPORT: CPT

## 2021-07-13 PROCEDURE — 99285 EMERGENCY DEPT VISIT HI MDM: CPT

## 2021-07-13 PROCEDURE — 71045 X-RAY EXAM CHEST 1 VIEW: CPT | Mod: 26

## 2021-07-13 PROCEDURE — 99222 1ST HOSP IP/OBS MODERATE 55: CPT

## 2021-07-13 RX ORDER — AMIODARONE HYDROCHLORIDE 400 MG/1
1 TABLET ORAL
Qty: 900 | Refills: 0 | Status: DISCONTINUED | OUTPATIENT
Start: 2021-07-13 | End: 2021-07-15

## 2021-07-13 RX ORDER — RIVAROXABAN 15 MG-20MG
20 KIT ORAL
Refills: 0 | Status: DISCONTINUED | OUTPATIENT
Start: 2021-07-13 | End: 2021-07-15

## 2021-07-13 RX ORDER — METOPROLOL TARTRATE 50 MG
5 TABLET ORAL ONCE
Refills: 0 | Status: COMPLETED | OUTPATIENT
Start: 2021-07-13 | End: 2021-07-13

## 2021-07-13 RX ORDER — ASPIRIN/CALCIUM CARB/MAGNESIUM 324 MG
162 TABLET ORAL ONCE
Refills: 0 | Status: COMPLETED | OUTPATIENT
Start: 2021-07-13 | End: 2021-07-13

## 2021-07-13 RX ORDER — SODIUM CHLORIDE 9 MG/ML
1000 INJECTION INTRAMUSCULAR; INTRAVENOUS; SUBCUTANEOUS ONCE
Refills: 0 | Status: COMPLETED | OUTPATIENT
Start: 2021-07-13 | End: 2021-07-13

## 2021-07-13 RX ORDER — AMIODARONE HYDROCHLORIDE 400 MG/1
150 TABLET ORAL ONCE
Refills: 0 | Status: COMPLETED | OUTPATIENT
Start: 2021-07-13 | End: 2021-07-13

## 2021-07-13 RX ORDER — FUROSEMIDE 40 MG
20 TABLET ORAL ONCE
Refills: 0 | Status: COMPLETED | OUTPATIENT
Start: 2021-07-13 | End: 2021-07-13

## 2021-07-13 RX ORDER — AMIODARONE HYDROCHLORIDE 400 MG/1
200 TABLET ORAL DAILY
Refills: 0 | Status: DISCONTINUED | OUTPATIENT
Start: 2021-07-13 | End: 2021-07-14

## 2021-07-13 RX ORDER — DILTIAZEM HCL 120 MG
10 CAPSULE, EXT RELEASE 24 HR ORAL ONCE
Refills: 0 | Status: COMPLETED | OUTPATIENT
Start: 2021-07-13 | End: 2021-07-13

## 2021-07-13 RX ADMIN — Medication 162 MILLIGRAM(S): at 19:14

## 2021-07-13 RX ADMIN — SODIUM CHLORIDE 1000 MILLILITER(S): 9 INJECTION INTRAMUSCULAR; INTRAVENOUS; SUBCUTANEOUS at 19:12

## 2021-07-13 RX ADMIN — Medication 5 MILLIGRAM(S): at 19:46

## 2021-07-13 RX ADMIN — AMIODARONE HYDROCHLORIDE 618 MILLIGRAM(S): 400 TABLET ORAL at 20:12

## 2021-07-13 RX ADMIN — Medication 20 MILLIGRAM(S): at 20:12

## 2021-07-13 RX ADMIN — Medication 5 MILLIGRAM(S): at 19:10

## 2021-07-13 NOTE — H&P ADULT - ASSESSMENT
Patient is a 65M with a PMH of Afib on ASA, recently started on Xarelto, asthma, hx of monomorphic VTac s/p ablation in 2019 who presents to the ED for palpitations.  Patient states that yesterday he started feeling short of breath so he took a nebulizer for what he assumed was his asthma.  Patient reports developing severe palpitations, called EMS who found him diaphoretic with unobtainable BP and HR of 220.  Patient was cardioverted at 100J in the field to sinus rhythm and brought to [facility] on 7/12.  Was discharged from ED with cardio follow up (Dr Ha.)  Patient was started on xarelto by cardio today in office.  Developed dyspnea at home again, had a nebulizer and developed palpitations again and called EMS.  Currently has no active complaints.  Tachy to 130 in ED, labs show mildly elevated troponin level.  Will admit to tele.            IMPROVE VTE Individual Risk Assessment          RISK                                                          Points  [  ] Previous VTE                                                3  [  ] Thrombophilia                                             2  [  ] Lower limb paralysis                                    2        (unable to hold up >15 seconds)    [  ] Current Cancer                                             2         (within 6 months)  [  ] Immobilization > 24 hrs                              1  [  ] ICU/CCU stay > 24 hours                            1  [  ] Age > 60                                                    1    IMPROVE VTE Score - 1

## 2021-07-13 NOTE — ED PROVIDER NOTE - PROGRESS NOTE DETAILS
rate improved after meds, trop elevated, will need inpatient monitoring and trop trending   asa given

## 2021-07-13 NOTE — H&P ADULT - HISTORY OF PRESENT ILLNESS
Patient is a 65M with a PMH of Afib on ASA, recently started on Xarelto, asthma, hx of monomorphic VTac s/p ablation in 2019 who presents to the ED for palpitations.  Patient states that yesterday he started feeling short of breath so he took a nebulizer for what he assumed was his asthma.  Patient reports developing severe palpitations, called EMS who found him diaphoretic with unobtainable BP and HR of 220.  Patient was cardioverted at 100J in the field to sinus rhythm and brought to [facility] on 7/12.  Was discharged from ED with cardio follow up (Dr Ha.)  Patient was started on xarelto by cardio today in office.  Developed dyspnea at home again, had a nebulizer and developed palpitations again and called EMS.  Currently has no active complaints.  Tachy to 130 in ED, labs show mildly elevated troponin level.  Will admit to tele.

## 2021-07-13 NOTE — ED PROVIDER NOTE - OBJECTIVE STATEMENT
64 yo male w/PMH of a-fib (on Xarelto and metoprolol 25mg) s/p ablation x2 years ago, asthma presents to the ED BIBEMS for palpitations. Pt was seen at [facility] yesterday for same complaints, went to see his cardiologist today and was prescribed a new medication which he does not know the name of, but he did not pick it up. Pt does endorse mild LE edema. Denies fever/chills, cough, CP, SOB, back pain, abdominal pain or N/V/D. No hx MI or stent.

## 2021-07-13 NOTE — ED ADULT NURSE NOTE - CHIEF COMPLAINT QUOTE
pt A&Ox3 c/o palpitations. hx of A-fib, on Xarelto. seen at [facility] last night. pt denies CP, n, v

## 2021-07-13 NOTE — H&P ADULT - PROBLEM SELECTOR PLAN 1
Patient received IV amio and metoprolol 5 IVP twice in ED  Will start PO amio and attempt to control HR with cardizem IVP  Amio drip if needed  Cardio eval in am

## 2021-07-13 NOTE — H&P ADULT - PROBLEM SELECTOR PLAN 2
Troponin elevated.  Will trend.  Likely due to uncontrolled AFib   No current chest pain.  Cardio eval in am.

## 2021-07-13 NOTE — ED PROVIDER NOTE - NS ED ROS FT
CONST: no fevers, no chills, no trauma  EYES: no pain, no visual disturbances  ENT: no sore throat, no epistaxis, no rhinorrhea, no hearing changes  CV: no chest pain, +palpitations, no orthopnea, no extremity pain or swelling  RESP: no shortness of breath, no cough, no sputum, no pleurisy, no wheezing  ABD: no abdominal pain, no nausea, no vomiting, no diarrhea, no black or bloody stool  : no dysuria, no hematuria, no frequency, no urgency  MSK: no back pain, no neck pain, no extremity pain  NEURO: no headache, no sensory disturbances, no focal weakness, no dizziness  HEME: no easy bleeding or bruising  SKIN: no diaphoresis, no rash

## 2021-07-13 NOTE — H&P ADULT - NSHPPHYSICALEXAM_GEN_ALL_CORE
Physical exam:  General: patient in no acute distress, resting comfortably  Head:  Atraumatic, Normocephalic  Eyes: EOMI, PERRLA, clear sclera  Neck: Supple, thyroid nontender, non enlarged  Cardio: S1/S2 +ve, irregular, rapid rate   Resp: clear to ausculation bilaterally, no rales or wheezes  GI: abdomen soft, nontender, non distended, no guarding, BS +ve x 4  Ext: no significant pedal edema  Neuro: CN 2-12 intact, no significant motor or sensory deficits.  Skin: No rashes or lesions

## 2021-07-13 NOTE — ED ADULT TRIAGE NOTE - CHIEF COMPLAINT QUOTE
pt A&Ox3 c/o palpitations. hx of A-fib, on Xarelto. seen at [facility] last night. pt denies CP, n, v

## 2021-07-13 NOTE — ED PROVIDER NOTE - PHYSICAL EXAMINATION
Gen: Alert, Well appearing. NAD    Head: NC, AT, PERRL, normal lids/conjunctiva   ENT: Bilateral TM WNL, patent oropharynx without erythema/exudate, uvula midline  Neck: supple, no tenderness/meningismus  Pulm: Bilateral clear BS, normal resp effort  CV: RRR, no M/R/G, +dist pulses   Abd: soft, NT/ND, +BS, no guarding/rebound tenderness  Mskel: extremities x4 with normal ROM. no ctl spine ttp. no edema/erythema/cyanosis   Skin: no rash, no bruising  Neuro: AAOx3, no sensory/motor deficits, CN 2-12 intact VITALS: reviewed  GEN: NAD, A & O x 4  HEAD/EYES: NCAT, EOMI, anicteric sclerae, n  ENT: mucus membranes moist, oropharynx WNL, trachea midline, no JVD  RESP: lungs CTA with equal breath sounds bilaterally, chest wall nontender and atraumatic  CV: heart with irregular rhythm S1, S2, tachycardic, distal pulses intact and symmetric bilaterally  ABDOMEN: normoactive bowel sounds, soft, nondistended, nontender, no palpable masses  : no CVAT  MSK: extremities atraumatic and nontender, no edema, no asymmetry.   SKIN: warm, dry, no rash, no bruising, no cyanosis. color appropriate for ethnicity  NEURO: alert, mentating appropriately, no facial asymmetry.   PSYCH: Affect appropriate

## 2021-07-13 NOTE — H&P ADULT - NSHPLABSRESULTS_GEN_ALL_CORE
Recent Vitals  T(C): 36.6 (21 @ 17:52), Max: 36.6 (21 @ 17:52)  HR: 94 (21 @ 17:52) (94 - 94)  BP: 105/68 (21 @ 17:52) (105/68 - 105/68)  RR: 18 (21 @ 17:52) (18 - 18)  SpO2: 95% (21 @ 17:52) (95% - 95%)                        14.7   4.32  )-----------( 123      ( 2021 19:38 )             44.5         140  |  102  |  13  ----------------------------<  134<H>  3.8   |  32<H>  |  1.28    Ca    8.5      2021 19:38  Phos  2.9       Mg     1.6         TPro  6.3  /  Alb  3.3  /  TBili  1.4<H>  /  DBili  x   /  AST  120<H>  /  ALT  157<H>  /  AlkPhos  103        LIVER FUNCTIONS - ( 2021 19:38 )  Alb: 3.3 g/dL / Pro: 6.3 gm/dL / ALK PHOS: 103 U/L / ALT: 157 U/L / AST: 120 U/L / GGT: x           Urinalysis Basic - ( 2021 19:36 )    Color: Yellow / Appearance: Clear / S.010 / pH: x  Gluc: x / Ketone: Negative  / Bili: Negative / Urobili: Negative mg/dL   Blood: x / Protein: 15 mg/dL / Nitrite: Negative   Leuk Esterase: Negative / RBC: 0-2 /HPF / WBC 0-2   Sq Epi: x / Non Sq Epi: Occasional / Bacteria: Few        Home Medications:

## 2021-07-13 NOTE — H&P ADULT - NSHPREVIEWOFSYSTEMS_GEN_ALL_CORE
Constitutional: no fever, chills, night sweats  Ears: no hearing changes or ear pain,   Nose: no nasal congestion, sinus pain, or rhinorrhea  Cardio: palpitations +, no chest pain, orthopnea, edema  Resp: dyspnea positive.  No cough, wheezing  GI: no nausea, vomiting, diarrhea, constipation, hematochezia, or melena  : no dysuria, urinary frequency, hematuria  MSK: no back pain, neck pain  Skin: no rash, pruritis   Neuro: no weakness, dizziness, lightheadedness, syncope   Heme/Lymph: no bruising or bleeding

## 2021-07-13 NOTE — ED ADULT NURSE NOTE - OBJECTIVE STATEMENT
pt A&Ox3 c/o palpitations. hx of A-fib, on Xarelto. seen at [facility] last night. pt denies CP, n, v

## 2021-07-14 PROBLEM — I48.91 UNSPECIFIED ATRIAL FIBRILLATION: Chronic | Status: ACTIVE | Noted: 2021-07-12

## 2021-07-14 PROBLEM — J45.909 UNSPECIFIED ASTHMA, UNCOMPLICATED: Chronic | Status: ACTIVE | Noted: 2021-07-12

## 2021-07-14 LAB
TROPONIN I SERPL-MCNC: 0.07 NG/ML — HIGH (ref 0.01–0.04)
TROPONIN I SERPL-MCNC: 0.08 NG/ML — HIGH (ref 0.01–0.04)
TSH SERPL-MCNC: 5.47 UU/ML — HIGH (ref 0.36–3.74)

## 2021-07-14 PROCEDURE — 99254 IP/OBS CNSLTJ NEW/EST MOD 60: CPT

## 2021-07-14 RX ORDER — ALBUTEROL 90 UG/1
3 AEROSOL, METERED ORAL
Qty: 0 | Refills: 0 | DISCHARGE

## 2021-07-14 RX ORDER — BUDESONIDE AND FORMOTEROL FUMARATE DIHYDRATE 160; 4.5 UG/1; UG/1
2 AEROSOL RESPIRATORY (INHALATION)
Qty: 0 | Refills: 0 | DISCHARGE

## 2021-07-14 RX ORDER — AMIODARONE HYDROCHLORIDE 400 MG/1
0.5 TABLET ORAL
Qty: 900 | Refills: 0 | Status: DISCONTINUED | OUTPATIENT
Start: 2021-07-14 | End: 2021-07-15

## 2021-07-14 RX ORDER — METOPROLOL TARTRATE 50 MG
25 TABLET ORAL
Refills: 0 | Status: DISCONTINUED | OUTPATIENT
Start: 2021-07-14 | End: 2021-07-15

## 2021-07-14 RX ORDER — METOPROLOL TARTRATE 50 MG
1 TABLET ORAL
Qty: 0 | Refills: 0 | DISCHARGE

## 2021-07-14 RX ADMIN — RIVAROXABAN 20 MILLIGRAM(S): KIT at 17:38

## 2021-07-14 RX ADMIN — AMIODARONE HYDROCHLORIDE 33.3 MG/MIN: 400 TABLET ORAL at 00:36

## 2021-07-14 RX ADMIN — AMIODARONE HYDROCHLORIDE 200 MILLIGRAM(S): 400 TABLET ORAL at 05:49

## 2021-07-14 RX ADMIN — AMIODARONE HYDROCHLORIDE 16.7 MG/MIN: 400 TABLET ORAL at 07:25

## 2021-07-14 RX ADMIN — Medication 25 MILLIGRAM(S): at 21:07

## 2021-07-14 NOTE — CONSULT NOTE ADULT - SUBJECTIVE AND OBJECTIVE BOX
CARDIOLOGY CONSULT NOTE    Patient is a 65y Male with a known history of :  Chronic atrial fibrillation [I48.20]    Troponin level elevated [R77.8]    Essential hypertension [I10]      HPI:  65M with a PMH of Afib on ASA and Xarelto.    Pt admitted 2019 with a WCT in the setting of chest tightness and palpitations and cardiomyopathy with LVEF 35-40%; treated with amiodarone 150mg IVP and placed on gtt.  Transferred to [facility] where he underwent cath to r/o CAD- normal cors, and subsuquently underwent ablation for what turned out to be atrial flutter with aberrancy.  7/12/21: Pt admitted with palpitations; EMS found him diaphoretic with an unobtainable BP and HR of 220; cardioverted at 100J in the field to sinus rhythm.    Was discharged from ED with cardio follow up (Dr Ha.)    7/13/21: Patient was started on xarelto in the office.    7/14/21: Developed dyspnea at home again with palpitations.  Tachy to 130 in ED.  Started on amio gtt.  HRs 110-130s        REVIEW OF SYSTEMS:    CONSTITUTIONAL: No fever, weight loss, or fatigue  EYES: No eye pain, visual disturbances, or discharge  ENMT:  No difficulty hearing, tinnitus, vertigo; No sinus or throat pain  NECK: No pain or stiffness  BREASTS: No pain, masses, or nipple discharge  RESPIRATORY: No cough, wheezing, chills or hemoptysis; No shortness of breath  CARDIOVASCULAR: No chest pain, palpitations, dizziness, or leg swelling  GASTROINTESTINAL: No abdominal or epigastric pain. No nausea, vomiting, or hematemesis; No diarrhea or constipation. No melena or hematochezia.  GENITOURINARY: No dysuria, frequency, hematuria, or incontinence  NEUROLOGICAL: No headaches, memory loss, loss of strength, numbness, or tremors  SKIN: No itching, burning, rashes, or lesions   LYMPH NODES: No enlarged glands  ENDOCRINE: No heat or cold intolerance; No hair loss  MUSCULOSKELETAL: No joint pain or swelling; No muscle, back, or extremity pain  PSYCHIATRIC: No depression, anxiety, mood swings, or difficulty sleeping  HEME/LYMPH: No easy bruising, or bleeding gums  ALLERGY AND IMMUNOLOGIC: No hives or eczema    MEDICATIONS  (STANDING):  aMIOdarone Infusion 1 mG/Min (33.3 mL/Hr) IV Continuous <Continuous>  aMIOdarone Infusion 0.5 mG/Min (16.7 mL/Hr) IV Continuous <Continuous>  rivaroxaban 20 milliGRAM(s) Oral with dinner    MEDICATIONS  (PRN):      ALLERGIES: No Known Allergies      FAMILY HISTORY:  FH: HTN (hypertension)        PHYSICAL EXAMINATION:  -----------------------------  T(C): 36.7 (07-14-21 @ 10:51), Max: 37.2 (07-14-21 @ 05:17)  HR: 98 (07-14-21 @ 10:51) (85 - 125)  BP: 122/87 (07-14-21 @ 10:51) (105/68 - 130/85)  RR: 17 (07-14-21 @ 10:51) (16 - 18)  SpO2: 98% (07-14-21 @ 10:51) (95% - 100%)        Constitutional: well developed, normal appearance, well groomed, well nourished, no deformities and no acute distress.   Eyes: the conjunctiva exhibited no abnormalities and the eyelids demonstrated no xanthelasmas.   HEENT: normal oral mucosa, no oral pallor and no oral cyanosis.   Neck: normal jugular venous A waves present, normal jugular venous V waves present and no jugular venous cruz A waves.   Pulmonary: no respiratory distress, normal respiratory rhythm and effort, no accessory muscle use and lungs were clear to auscultation bilaterally.   Cardiovascular:  tachy irreg irreg  Abdomen: soft, non-tender, no hepato-splenomegaly and no abdominal mass palpated.   Musculoskeletal: the gait could not be assessed..   Extremities: no clubbing of the fingernails, no localized cyanosis, no petechial hemorrhages and no ischemic changes.   Skin: normal skin color and pigmentation, no rash, no venous stasis, no skin lesions, no skin ulcer and no xanthoma was observed.   Psychiatric: oriented to person, place, and time, the affect was normal, the mood was normal and not feeling anxious.     LABS:   --------  07-13    140  |  102  |  13  ----------------------------<  134<H>  3.8   |  32<H>  |  1.28    Ca    8.5      13 Jul 2021 19:38  Phos  2.9     07-12  Mg     1.6     07-13    TPro  6.3  /  Alb  3.3  /  TBili  1.4<H>  /  DBili  x   /  AST  120<H>  /  ALT  157<H>  /  AlkPhos  103  07-13                         14.7   4.32  )-----------( 123      ( 13 Jul 2021 19:38 )             44.5       07-13 @ 19:38 BNP: 3282 pg/mL    07-14 @ 02:21 CPK total:--, CKMB --, Troponin I - .065 ng/mL<H>  07-13 @ 23:54 CPK total:--, CKMB --, Troponin I - .076 ng/mL<H>  07-13 @ 19:38 CPK total:--, CKMB --, Troponin I - .078 ng/mL<H>          RADIOLOGY:  -----------------  e< from: TTE with Doppler (w/Cont) (02.20.19 @ 19:38) >  Conclusions:  1. Mitral annular calcification, otherwise normal mitral  valve. Mild-moderate mitral regurgitation.  2. Normal left ventricular internal dimensions and wall  thicknesses.  3. Moderate global left ventricular systolic  dysfunction.Endocardial visualization enhanced with  intravenous injection of Ultrasonic Enhancing Agent  (Definity).  4. Moderate right atrial enlargement.  5. Right ventricular enlargement with decreased right  ventricular systolic function.  6. Estimated right ventricular systolic pressure equals 33  mm Hg, assuming right atrial pressure equals 8 mm Hg,  consistent with normal pulmonary pressures.  7. Normal tricuspid valve. Moderate-severe tricuspid  regurgitation.  *** No previous Echo exam.  ------------------------------------------------------------------------    < end of copied text >

## 2021-07-14 NOTE — CONSULT NOTE ADULT - PROVIDER SPECIALTY LIST ADULT
Cardiology Alert and oriented to person, place, time/situation. normal mood and affect. no apparent risk to self or others.

## 2021-07-14 NOTE — PROGRESS NOTE ADULT - SUBJECTIVE AND OBJECTIVE BOX
SUBJECTIVE / OVERNIGHT EVENTS: pt denies chest pain, shortness of breath       MEDICATIONS  (STANDING):  aMIOdarone Infusion 1 mG/Min (33.3 mL/Hr) IV Continuous <Continuous>  aMIOdarone Infusion 0.5 mG/Min (16.7 mL/Hr) IV Continuous <Continuous>  metoprolol tartrate 25 milliGRAM(s) Oral two times a day  rivaroxaban 20 milliGRAM(s) Oral with dinner    MEDICATIONS  (PRN):    Vital Signs Last 24 Hrs  T(C): 36.4 (2021 23:58), Max: 37.2 (2021 05:17)  T(F): 97.6 (2021 23:58), Max: 99 (2021 05:17)  HR: 85 (2021 23:58) (85 - 124)  BP: 129/89 (2021 23:58) (122/87 - 143/98)  BP(mean): --  RR: 18 (2021 23:58) (17 - 18)  SpO2: 99% (2021 23:58) (96% - 99%)    CAPILLARY BLOOD GLUCOSE        I&O's Summary    2021 07:01  -  15 Jul 2021 00:54  --------------------------------------------------------  IN: 413 mL / OUT: 500 mL / NET: -87 mL        Constitutional: No fever, fatigue  Skin: No rash.  Eyes: No recent vision problems or eye pain.  ENT: No congestion, ear pain, or sore throat.  Cardiovascular: No chest pain or palpation.  Respiratory: No cough, shortness of breath, congestion, or wheezing.  Gastrointestinal: No abdominal pain, nausea, vomiting, or diarrhea.  Genitourinary: No dysuria.  Musculoskeletal: No joint swelling.  Neurologic: No headache.    PHYSICAL EXAM:  GENERAL: NAD  EYES: EOMI, PERRLA  NECK: Supple, No JVD  CHEST/LUNG: dec breath sounds at bases  HEART:  S1 , S2 + irreg  ABDOMEN: soft , bs+  EXTREMITIES: trace edema+   NEUROLOGY:alert awake      LABS:                        14.7   4.32  )-----------( 123      ( 2021 19:38 )             44.5     07-13    140  |  102  |  13  ----------------------------<  134<H>  3.8   |  32<H>  |  1.28    Ca    8.5      2021 19:38  Mg     1.6     07-13    TPro  6.3  /  Alb  3.3  /  TBili  1.4<H>  /  DBili  x   /  AST  120<H>  /  ALT  157<H>  /  AlkPhos  103  07-13      CARDIAC MARKERS ( 2021 02:21 )  .065 ng/mL / x     / x     / x     / x      CARDIAC MARKERS ( 2021 23:54 )  .076 ng/mL / x     / x     / x     / x      CARDIAC MARKERS ( 2021 19:38 )  .078 ng/mL / x     / x     / x     / x          Urinalysis Basic - ( 2021 19:36 )    Color: Yellow / Appearance: Clear / S.010 / pH: x  Gluc: x / Ketone: Negative  / Bili: Negative / Urobili: Negative mg/dL   Blood: x / Protein: 15 mg/dL / Nitrite: Negative   Leuk Esterase: Negative / RBC: 0-2 /HPF / WBC 0-2   Sq Epi: x / Non Sq Epi: Occasional / Bacteria: Few        RADIOLOGY & ADDITIONAL TESTS:    Imaging Personally Reviewed:    Consultant(s) Notes Reviewed:      Care Discussed with Consultants/Other Providers:

## 2021-07-14 NOTE — ACUTE INTERFACILITY TRANSFER NOTE - HOSPITAL COURSE
Patient Education     Pharyngitis: Strep (Confirmed)     You have had a positive test for strep throat. Strep throat is a contagious illness. It is spread by coughing, kissing or by touching others after touching your mouth or nose. Symptoms include throat pain which is worse with swallowing, aching all over, headache and fever. It is treated with antibiotic medication. This should help you start to feel better within 1-2 days.  Home care  · Rest at home. Drink plenty of fluids to avoid dehydration.  · No work or school for the first 2 days of taking the antibiotics. After this time, you will not be contagious. You can then return to school or work if you are feeling better.   · The antibiotic medication must be taken for the full 10 days, even if you feel better. This is very important to ensure the infection is treated. It is also important to prevent drug-resistent organisms from developing. If you were given an antibiotic shot, no more antibiotics are needed.  · You may use acetaminophen (Tylenol) or ibuprofen (Motrin, Advil) to control pain or fever, unless another medicine was prescribed for this. (NOTE: If you have chronic liver or kidney disease or ever had a stomach ulcer or GI bleeding, talk with your doctor before using these medicines.)  · Throat lozenges or sprays (such as Chloraseptic) help reduce pain. Gargling with warm salt water will also reduce throat pain. Dissolve 1/2 teaspoon of salt in 1 glass of warm water. This may be useful just before meals.   · Soft foods are okay. Avoid salty or spicy foods.  Follow-up care  Follow up with your healthcare provider or our staff if you are not improving over the next week.  When to seek medical advice  Call your healthcare provider right away if any of these occur:  · Fever as directed by your doctor   · New or worsening ear pain, sinus pain, or headache  · Painful lumps in the back of neck  · Stiff neck  · Lymph nodes are getting larger or becoming soft  Pt admitted 2019 with a WCT in the setting of chest tightness and palpitations and cardiomyopathy with LVEF 35-40%; treated with amiodarone 150mg IVP and placed on gtt.  Transferred to [facility] where he underwent cath to r/o CAD- normal cors, and subsuquently underwent ablation for what turned out to be atrial flutter with aberrancy.  7/12/21: Pt admitted with palpitations; EMS found him diaphoretic with an unobtainable BP and HR of 220; cardioverted at 100J in the field to sinus rhythm.  Cariology:   -D/W Dr. Ha; plan to transfer to [facility] today for EPS eval and likely repeat ablation.  -Currently on amio; will add low-dose bb  -On Xarelto for AC. in the middle  · Inability to swallow liquids, excessive drooling, or inability to open mouth wide due to throat pain  · Signs of dehydration (very dark urine or no urine, sunken eyes, dizziness)  · Trouble breathing or noisy breathing  · Muffled voice  · New rash  © 7026-8389 [employer]. [address] [place] [address], [place] [address]. All rights reserved. This information is not intended as a substitute for professional medical care. Always follow your healthcare professional's instructions.

## 2021-07-14 NOTE — CONSULT NOTE ADULT - ASSESSMENT
65M with a PMH of Afib on ASA and Xarelto.    Pt admitted 2019 with a WCT in the setting of chest tightness and palpitations and cardiomyopathy with LVEF 35-40%; treated with amiodarone 150mg IVP and placed on gtt.  Transferred to [facility] where he underwent cath to r/o CAD- normal cors, and subsuquently underwent ablation for what turned out to be atrial flutter with aberrancy.  7/12/21: Pt admitted with palpitations; EMS found him diaphoretic with an unobtainable BP and HR of 220; cardioverted at 100J in the field to sinus rhythm.    Was discharged from ED with cardio follow up (Dr Ha.)    7/13/21: Patient was started on xarelto in the office.    7/14/21: Developed dyspnea at home again with palpitations.  Tachy to 130 in ED.  Started on amio gtt.  HRs 110-130s      D/W Dr. Ha; plan to transfer to [facility] today for EPS eval and likely repeat ablation.  Currently on amio; will add low-dose bb  On Xarelto for AC.

## 2021-07-14 NOTE — ACUTE INTERFACILITY TRANSFER NOTE - PLAN OF CARE
Currently on amiodarone gtt. For transfer to [facility] under Dr. Whitt. ablation/EPS eval Blood pressure currently controlled off medications return to normal heart rhythm secondary to RVR. continue to trend

## 2021-07-15 ENCOUNTER — INPATIENT (INPATIENT)
Facility: HOSPITAL | Age: 66
LOS: 5 days | Discharge: HOME | End: 2021-07-21
Attending: INTERNAL MEDICINE | Admitting: INTERNAL MEDICINE
Payer: COMMERCIAL

## 2021-07-15 VITALS
HEIGHT: 71 IN | DIASTOLIC BLOOD PRESSURE: 95 MMHG | HEART RATE: 78 BPM | RESPIRATION RATE: 16 BRPM | WEIGHT: 315 LBS | OXYGEN SATURATION: 100 % | SYSTOLIC BLOOD PRESSURE: 130 MMHG | TEMPERATURE: 98 F

## 2021-07-15 VITALS
OXYGEN SATURATION: 100 % | SYSTOLIC BLOOD PRESSURE: 129 MMHG | HEART RATE: 75 BPM | TEMPERATURE: 98 F | RESPIRATION RATE: 15 BRPM | DIASTOLIC BLOOD PRESSURE: 90 MMHG

## 2021-07-15 DIAGNOSIS — I51.9 HEART DISEASE, UNSPECIFIED: ICD-10-CM

## 2021-07-15 LAB
ALBUMIN SERPL ELPH-MCNC: 3.3 G/DL — SIGNIFICANT CHANGE UP (ref 3.3–5)
ALP SERPL-CCNC: 94 U/L — SIGNIFICANT CHANGE UP (ref 40–120)
ALT FLD-CCNC: 129 U/L — HIGH (ref 12–78)
ANION GAP SERPL CALC-SCNC: 7 MMOL/L — SIGNIFICANT CHANGE UP (ref 5–17)
AST SERPL-CCNC: 81 U/L — HIGH (ref 15–37)
BILIRUB DIRECT SERPL-MCNC: 0.4 MG/DL — HIGH (ref 0.05–0.2)
BILIRUB INDIRECT FLD-MCNC: 1.9 MG/DL — HIGH (ref 0.2–1)
BILIRUB SERPL-MCNC: 2.3 MG/DL — HIGH (ref 0.2–1.2)
BUN SERPL-MCNC: 13 MG/DL — SIGNIFICANT CHANGE UP (ref 7–23)
CALCIUM SERPL-MCNC: 8.9 MG/DL — SIGNIFICANT CHANGE UP (ref 8.5–10.1)
CHLORIDE SERPL-SCNC: 102 MMOL/L — SIGNIFICANT CHANGE UP (ref 96–108)
CHOLEST SERPL-MCNC: 125 MG/DL — SIGNIFICANT CHANGE UP
CO2 SERPL-SCNC: 29 MMOL/L — SIGNIFICANT CHANGE UP (ref 22–31)
COVID-19 SPIKE DOMAIN AB INTERP: POSITIVE
COVID-19 SPIKE DOMAIN ANTIBODY RESULT: >250 U/ML — HIGH
CREAT SERPL-MCNC: 1.1 MG/DL — SIGNIFICANT CHANGE UP (ref 0.5–1.3)
GLUCOSE SERPL-MCNC: 60 MG/DL — LOW (ref 70–99)
HDLC SERPL-MCNC: 48 MG/DL — SIGNIFICANT CHANGE UP
LIPID PNL WITH DIRECT LDL SERPL: 63 MG/DL — SIGNIFICANT CHANGE UP
NON HDL CHOLESTEROL: 76 MG/DL — SIGNIFICANT CHANGE UP
NT-PROBNP SERPL-SCNC: 1783 PG/ML — HIGH (ref 0–125)
POTASSIUM SERPL-MCNC: 3.9 MMOL/L — SIGNIFICANT CHANGE UP (ref 3.5–5.3)
POTASSIUM SERPL-SCNC: 3.9 MMOL/L — SIGNIFICANT CHANGE UP (ref 3.5–5.3)
PROT SERPL-MCNC: 6.7 GM/DL — SIGNIFICANT CHANGE UP (ref 6–8.3)
SARS-COV-2 IGG+IGM SERPL QL IA: >250 U/ML — HIGH
SARS-COV-2 IGG+IGM SERPL QL IA: POSITIVE
SODIUM SERPL-SCNC: 138 MMOL/L — SIGNIFICANT CHANGE UP (ref 135–145)
TRIGL SERPL-MCNC: 68 MG/DL — SIGNIFICANT CHANGE UP

## 2021-07-15 PROCEDURE — 99233 SBSQ HOSP IP/OBS HIGH 50: CPT

## 2021-07-15 RX ORDER — FUROSEMIDE 40 MG
40 TABLET ORAL
Refills: 0 | Status: DISCONTINUED | OUTPATIENT
Start: 2021-07-15 | End: 2021-07-15

## 2021-07-15 RX ORDER — SIMETHICONE 80 MG/1
80 TABLET, CHEWABLE ORAL EVERY 6 HOURS
Refills: 0 | Status: DISCONTINUED | OUTPATIENT
Start: 2021-07-15 | End: 2021-07-15

## 2021-07-15 RX ORDER — METOPROLOL TARTRATE 50 MG
5 TABLET ORAL ONCE
Refills: 0 | Status: COMPLETED | OUTPATIENT
Start: 2021-07-15 | End: 2021-07-15

## 2021-07-15 RX ORDER — AMIODARONE HYDROCHLORIDE 400 MG/1
200 TABLET ORAL
Refills: 0 | Status: DISCONTINUED | OUTPATIENT
Start: 2021-07-15 | End: 2021-07-15

## 2021-07-15 RX ORDER — METOPROLOL TARTRATE 50 MG
25 TABLET ORAL EVERY 6 HOURS
Refills: 0 | Status: DISCONTINUED | OUTPATIENT
Start: 2021-07-15 | End: 2021-07-15

## 2021-07-15 RX ORDER — POTASSIUM CHLORIDE 20 MEQ
20 PACKET (EA) ORAL ONCE
Refills: 0 | Status: COMPLETED | OUTPATIENT
Start: 2021-07-15 | End: 2021-07-15

## 2021-07-15 RX ADMIN — Medication 5 MILLIGRAM(S): at 03:21

## 2021-07-15 RX ADMIN — Medication 5 MILLIGRAM(S): at 15:33

## 2021-07-15 RX ADMIN — SIMETHICONE 80 MILLIGRAM(S): 80 TABLET, CHEWABLE ORAL at 10:52

## 2021-07-15 RX ADMIN — AMIODARONE HYDROCHLORIDE 200 MILLIGRAM(S): 400 TABLET ORAL at 18:55

## 2021-07-15 RX ADMIN — AMIODARONE HYDROCHLORIDE 200 MILLIGRAM(S): 400 TABLET ORAL at 10:50

## 2021-07-15 RX ADMIN — Medication 5 MILLIGRAM(S): at 10:03

## 2021-07-15 RX ADMIN — Medication 25 MILLIGRAM(S): at 06:19

## 2021-07-15 RX ADMIN — Medication 25 MILLIGRAM(S): at 18:56

## 2021-07-15 RX ADMIN — Medication 40 MILLIGRAM(S): at 18:56

## 2021-07-15 RX ADMIN — RIVAROXABAN 20 MILLIGRAM(S): KIT at 18:56

## 2021-07-15 RX ADMIN — Medication 20 MILLIEQUIVALENT(S): at 15:33

## 2021-07-15 RX ADMIN — Medication 40 MILLIGRAM(S): at 06:18

## 2021-07-15 RX ADMIN — SIMETHICONE 80 MILLIGRAM(S): 80 TABLET, CHEWABLE ORAL at 03:39

## 2021-07-15 NOTE — PROGRESS NOTE ADULT - SUBJECTIVE AND OBJECTIVE BOX
SUBJECTIVE / OVERNIGHT EVENTS: pt denies chest pain, shortness of breath     MEDICATIONS  (STANDING):  aMIOdarone    Tablet 200 milliGRAM(s) Oral two times a day  furosemide   Injectable 40 milliGRAM(s) IV Push two times a day  metoprolol tartrate 25 milliGRAM(s) Oral every 6 hours  rivaroxaban 20 milliGRAM(s) Oral with dinner    MEDICATIONS  (PRN):  simethicone 80 milliGRAM(s) Chew every 6 hours PRN Indigestion    Vital Signs Last 24 Hrs  T(C): 36.4 (15 Jul 2021 10:24), Max: 36.6 (15 Jul 2021 04:51)  T(F): 97.5 (15 Jul 2021 10:24), Max: 97.8 (15 Jul 2021 04:51)  HR: 84 (15 Jul 2021 15:30) (84 - 128)  BP: 107/89 (15 Jul 2021 15:30) (107/89 - 143/98)  BP(mean): --  RR: 17 (15 Jul 2021 10:24) (17 - 18)  SpO2: 99% (15 Jul 2021 10:24) (99% - 99%)    Constitutional: No fever, fatigue  Skin: No rash.  Eyes: No recent vision problems or eye pain.  ENT: No congestion, ear pain, or sore throat.  Cardiovascular: No chest pain or palpation.  Respiratory: No cough, shortness of breath, congestion, or wheezing.  Gastrointestinal: No abdominal pain, nausea, vomiting, or diarrhea.  Genitourinary: No dysuria.  Musculoskeletal: No joint swelling.  Neurologic: No headache.    PHYSICAL EXAM:  GENERAL: NAD  EYES: EOMI, PERRLA  NECK: Supple, No JVD  CHEST/LUNG: dec breath sounds at bases  HEART:  S1 , S2 + irreg  ABDOMEN: soft , bs+  EXTREMITIES: trace edema+   NEUROLOGY:alert awake    LABS:  07-15    138  |  102  |  13  ----------------------------<  60<L>  3.9   |  29  |  1.10    Ca    8.9      15 Jul 2021 09:50  Mg     1.6         TPro  6.7  /  Alb  3.3  /  TBili  2.3<H>  /  DBili  .40<H>  /  AST  81<H>  /  ALT  129<H>  /  AlkPhos  94  07-15    Creatinine Trend: 1.10 <--, 1.28 <--, 1.21 <--, 1.45 <--                        14.7   4.32  )-----------( 123      ( 2021 19:38 )             44.5     Urine Studies:  Urinalysis Basic - ( 2021 19:36 )    Color: Yellow / Appearance: Clear / S.010 / pH:   Gluc:  / Ketone: Negative  / Bili: Negative / Urobili: Negative mg/dL   Blood:  / Protein: 15 mg/dL / Nitrite: Negative   Leuk Esterase: Negative / RBC: 0-2 /HPF / WBC 0-2   Sq Epi:  / Non Sq Epi: Occasional / Bacteria: Few        CARDIAC MARKERS ( 2021 02:21 )  .065 ng/mL / x     / x     / x     / x      CARDIAC MARKERS ( 2021 23:54 )  .076 ng/mL / x     / x     / x     / x      CARDIAC MARKERS ( 2021 19:38 )  .078 ng/mL / x     / x     / x     / x            LIVER FUNCTIONS - ( 15 Jul 2021 09:50 )  Alb: 3.3 g/dL / Pro: 6.7 gm/dL / ALK PHOS: 94 U/L / ALT: 129 U/L / AST: 81 U/L / GGT: x               Imaging Personally Reviewed:    Consultant(s) Notes Reviewed:      Care Discussed with Consultants/Other Providers:

## 2021-07-15 NOTE — PROGRESS NOTE ADULT - PROBLEM SELECTOR PLAN 2
Troponin elevated.  Will trend.  Likely due to uncontrolled AFib   No current chest pain.  Cardio eval in am.
Troponin elevated.  Will trend.  Likely due to uncontrolled AFib   No current chest pain.  Cardio eval in am.

## 2021-07-15 NOTE — PROGRESS NOTE ADULT - PROBLEM SELECTOR PLAN 1
Patient received IV amio and metoprolol 5 IVP twice in ED  Started on amio gtt.  HRs 110-130s  poss transfer to [facility] for repeat ablation
Patient received IV amio and metoprolol 5 IVP twice in ED  Started on amio gtt.  HRs 110-130s  poss transfer to [facility] for repeat ablation
GDMA2/Gestational Diabetes/Maternal Positive GBS

## 2021-07-15 NOTE — PROGRESS NOTE ADULT - SUBJECTIVE AND OBJECTIVE BOX
Patient is a 65y old  Male who presents with a chief complaint of uncontrolled AFib (14 Jul 2021 13:54)    PAST MEDICAL & SURGICAL HISTORY:  Asthma    Atrial fibrillation    Asthma    Atrial fibrillation    No significant past surgical history    No significant past surgical history    INTERVAL HISTORY: Patient in bed, c/o palpitations; denies chest pain, SOB or dyspnea.  	  MEDICATIONS:  MEDICATIONS  (STANDING):  aMIOdarone    Tablet 200 milliGRAM(s) Oral two times a day  aMIOdarone Infusion 1 mG/Min (33.3 mL/Hr) IV Continuous <Continuous>  aMIOdarone Infusion 0.5 mG/Min (16.7 mL/Hr) IV Continuous <Continuous>  furosemide   Injectable 40 milliGRAM(s) IV Push two times a day  metoprolol tartrate 25 milliGRAM(s) Oral two times a day  rivaroxaban 20 milliGRAM(s) Oral with dinner    MEDICATIONS  (PRN):  simethicone 80 milliGRAM(s) Chew every 6 hours PRN Indigestion      Vitals:  T(F): 97.5 (07-15-21 @ 10:24), Max: 98.8 (07-14-21 @ 16:19)  HR: 110 (07-15-21 @ 10:24) (85 - 128)  BP: 119/82 (07-15-21 @ 10:24) (116/83 - 143/98)  RR: 17 (07-15-21 @ 10:24) (17 - 18)  SpO2: 99% (07-15-21 @ 10:24) (96% - 99%)  Wt(kg): --    07-14 @ 07:01  -  07-15 @ 07:00  --------------------------------------------------------  IN:    Oral Fluid: 413 mL  Total IN: 413 mL    OUT:    Voided (mL): 500 mL  Total OUT: 500 mL    Total NET: -87 mL        Weight (kg): 76 (07-14 @ 05:17)  BMI (kg/m2): 23.4 (07-14 @ 05:17)      PHYSICAL EXAM:  Neuro: Awake, responsive  CV: S1 S2 RRR  Lungs: CTABL  GI: Soft, BS +, ND, NT  Extremities: No edema    TELEMETRY:   	    ECG:  	    RADIOLOGY:     DIAGNOSTIC TESTING:    [ ] Echocardiogram:  [ ]  Catheterization:  [ ] Stress Test:    OTHER: 	2    LABS:	 	    CARDIAC MARKERS:  Troponin I, Serum: .065 ng/mL (07-14 @ 02:21)  Troponin I, Serum: .076 ng/mL (07-13 @ 23:54)  Troponin I, Serum: .078 ng/mL (07-13 @ 19:38)          15 Jul 2021 09:50    138    |  102    |  13     ----------------------------<  60     3.9     |  29     |  1.10   13 Jul 2021 19:38    140    |  102    |  13     ----------------------------<  134    3.8     |  32     |  1.28   12 Jul 2021 21:38    138    |  99     |  13     ----------------------------<  96     5.6     |  25     |  1.21     Ca    8.9        15 Jul 2021 09:50  Mg     1.6       13 Jul 2021 19:38    TPro  6.7    /  Alb  3.3    /  TBili  2.3    /  DBili  .40    /  AST  81     /  ALT  129    /  AlkPhos  94     15 Jul 2021 09:50                          14.7   4.32  )-----------( 123      ( 13 Jul 2021 19:38 )             44.5 ,                       15.0   4.04  )-----------( 114      ( 12 Jul 2021 17:43 )             46.8   proBNP: Serum Pro-Brain Natriuretic Peptide: 1783 pg/mL (07-15 @ 09:50)  Serum Pro-Brain Natriuretic Peptide: 3282 pg/mL (07-13 @ 19:38)  Serum Pro-Brain Natriuretic Peptide: 2498 pg/mL (07-12 @ 17:43)    Lipid Profile:   HgA1c:   TSH: Thyroid Stimulating Hormone, Serum: 5.470 uU/mL (07-14 @ 02:21)                 Patient is a 65y old  Male who presents with a chief complaint of uncontrolled AFib (14 Jul 2021 13:54)    PAST MEDICAL & SURGICAL HISTORY:  Asthma    Atrial fibrillation    Asthma    Atrial fibrillation    No significant past surgical history    No significant past surgical history    INTERVAL HISTORY: Patient in bed, c/o palpitations; denies chest pain, SOB or dyspnea.  	  MEDICATIONS:  MEDICATIONS  (STANDING):  aMIOdarone    Tablet 200 milliGRAM(s) Oral two times a day  aMIOdarone Infusion 1 mG/Min (33.3 mL/Hr) IV Continuous <Continuous>  aMIOdarone Infusion 0.5 mG/Min (16.7 mL/Hr) IV Continuous <Continuous>  furosemide   Injectable 40 milliGRAM(s) IV Push two times a day  metoprolol tartrate 25 milliGRAM(s) Oral two times a day  rivaroxaban 20 milliGRAM(s) Oral with dinner    MEDICATIONS  (PRN):  simethicone 80 milliGRAM(s) Chew every 6 hours PRN Indigestion      Vitals:  T(F): 97.5 (07-15-21 @ 10:24), Max: 98.8 (07-14-21 @ 16:19)  HR: 110 (07-15-21 @ 10:24) (85 - 128)  BP: 119/82 (07-15-21 @ 10:24) (116/83 - 143/98)  RR: 17 (07-15-21 @ 10:24) (17 - 18)  SpO2: 99% (07-15-21 @ 10:24) (96% - 99%)  Wt(kg): --75.8 kg    07-14 @ 07:01  -  07-15 @ 07:00  --------------------------------------------------------  IN:    Oral Fluid: 413 mL  Total IN: 413 mL    OUT:    Voided (mL): 500 mL  Total OUT: 500 mL    Total NET: -87 mL    Weight (kg): 76 (07-14 @ 05:17)  BMI (kg/m2): 23.4 (07-14 @ 05:17)    PHYSICAL EXAM:  Neuro: Awake, responsive  CV: S1 S2 irregular  Lungs: CTABL  GI: Soft, BS +, ND, NT  Extremities: No edema    TELEMETRY: Afib  	    ECG:  	< from: 12 Lead ECG (07.13.21 @ 18:12) >    Ventricular Rate 141 BPM    Atrial Rate 326 BPM    QRS Duration 84 ms    Q-T Interval 312 ms    QTC Calculation(Bazett) 477 ms    R Axis 5 degrees    T Axis 47 degrees    Diagnosis Line Atrial flutter with variable AV block with premature ventricular or aberrantly conducted complexes  Abnormal ECG  When compared with ECG of 13-JUL-2021 18:11,  Atrial flutter has replaced Atrial fibrillation    < end of copied text >    RADIOLOGY: < from: Xray Chest 1 View- PORTABLE-Urgent (07.13.21 @ 18:37) >  INTERPRETATION:  Chest pain    AP chest. Prior 7/12/2021    No change heart mediastinum. No change in the chronic appearing opacities right upper lobe with associated volume loss. No acute infiltrate pleural effusion or other new abnormality    IMPRESSION: No acute change from 7/12/2021    < end of copied text >    DIAGNOSTIC TESTING:    [x ] Echocardiogram: < from: TTE with Doppler (w/Cont) (02.20.19 @ 19:38) >  Conclusions:  1. Mitral annular calcification, otherwise normal mitral  valve. Mild-moderate mitral regurgitation.  2. Normal left ventricular internal dimensions and wall  thicknesses.  3. Moderate global left ventricular systolic  dysfunction.Endocardial visualization enhanced with  intravenous injection of Ultrasonic Enhancing Agent  (Definity).  4. Moderate right atrial enlargement.  5. Right ventricular enlargement with decreased right  ventricular systolic function.  6. Estimated right ventricular systolic pressure equals 33  mm Hg, assuming right atrial pressure equals 8 mm Hg,  consistent with normal pulmonary pressures.  7. Normal tricuspid valve. Moderate-severe tricuspid  regurgitation.  *** No previous Echo exam.    < end of copied text >    [x ]  Catheterization: < from: Cardiac Cath Lab - Adult (02.21.19 @ 15:30) >  CORONARY VESSELS: The coronary circulation is right dominant.  LM:   --  LM: Normal.  LAD:   --  LAD: Normal.  CX:   --  Circumflex: Normal.  RCA:   --  RCA: Normal.    < end of copied text >    LABS:	 	    CARDIAC MARKERS:  Troponin I, Serum: .065 ng/mL (07-14 @ 02:21)  Troponin I, Serum: .076 ng/mL (07-13 @ 23:54)  Troponin I, Serum: .078 ng/mL (07-13 @ 19:38)    15 Jul 2021 09:50    138    |  102    |  13     ----------------------------<  60     3.9     |  29     |  1.10   13 Jul 2021 19:38    140    |  102    |  13     ----------------------------<  134    3.8     |  32     |  1.28   12 Jul 2021 21:38    138    |  99     |  13     ----------------------------<  96     5.6     |  25     |  1.21     Ca    8.9        15 Jul 2021 09:50  Mg     1.6       13 Jul 2021 19:38    TPro  6.7    /  Alb  3.3    /  TBili  2.3    /  DBili  .40    /  AST  81     /  ALT  129    /  AlkPhos  94     15 Jul 2021 09:50                          14.7   4.32  )-----------( 123      ( 13 Jul 2021 19:38 )             44.5 ,                       15.0   4.04  )-----------( 114      ( 12 Jul 2021 17:43 )             46.8   proBNP: Serum Pro-Brain Natriuretic Peptide: 1783 pg/mL (07-15 @ 09:50)  Serum Pro-Brain Natriuretic Peptide: 3282 pg/mL (07-13 @ 19:38)  Serum Pro-Brain Natriuretic Peptide: 2498 pg/mL (07-12 @ 17:43)    Lipid Profile:   HgA1c:   TSH: Thyroid Stimulating Hormone, Serum: 5.470 uU/mL (07-14 @ 02:21)

## 2021-07-15 NOTE — PROGRESS NOTE ADULT - ASSESSMENT
Patient is a 65M with a PMH of Afib on ASA, recently started on Xarelto, asthma, hx of monomorphic VTac s/p ablation in 2019 who presents to the ED for palpitations.  Patient states that yesterday he started feeling short of breath so he took a nebulizer for what he assumed was his asthma.  Patient reports developing severe palpitations, called EMS who found him diaphoretic with unobtainable BP and HR of 220.  Patient was cardioverted at 100J in the field to sinus rhythm and brought to [facility] on 7/12.  Was discharged from ED with cardio follow up (Dr Ha.)  Patient was started on xarelto by cardio today in office.  Developed dyspnea at home again, had a nebulizer and developed palpitations again and called EMS.  Currently has no active complaints.  Tachy to 130 in ED, labs show mildly elevated troponin level.  Will admit to tele.            IMPROVE VTE Individual Risk Assessment          RISK                                                          Points  [  ] Previous VTE                                                3  [  ] Thrombophilia                                             2  [  ] Lower limb paralysis                                    2        (unable to hold up >15 seconds)    [  ] Current Cancer                                             2         (within 6 months)  [  ] Immobilization > 24 hrs                              1  [  ] ICU/CCU stay > 24 hours                            1  [  ] Age > 60                                                    1    IMPROVE VTE Score - 1
65M with a PMH of Afib on ASA and Xarelto.    Pt admitted 2019 with a WCT in the setting of chest tightness and palpitations and cardiomyopathy with LVEF 35-40%; treated with amiodarone 150mg IVP and placed on gtt.  Transferred to [facility] where he underwent cath to r/o CAD- normal cors, and subsuquently underwent ablation for what turned out to be atrial flutter with aberrancy.  7/12/21: Pt admitted with palpitations; EMS found him diaphoretic with an unobtainable BP and HR of 220; cardioverted at 100J in the field to sinus rhythm.    Was discharged from ED with cardio follow up (Dr Ha.)    7/13/21: Patient was started on xarelto in the office.    7/14/21: Developed dyspnea at home again with palpitations.  Tachy to 130 in ED.  Started on amio gtt.  HRs 110-130s Amio drip completed. Oral Amio started.       D/W Dr. Ha; plan to transfer to [facility] today for EPS eval and likely repeat ablation. Awaiting [facility] Bed.  Currently on amio; will add low-dose bb  On Xarelto for AC.  Metoprolol increased to 25 mg Q6H. As BP improves may titrate dose.
Patient is a 65M with a PMH of Afib on ASA, recently started on Xarelto, asthma, hx of monomorphic VTac s/p ablation in 2019 who presents to the ED for palpitations.  Patient states that yesterday he started feeling short of breath so he took a nebulizer for what he assumed was his asthma.  Patient reports developing severe palpitations, called EMS who found him diaphoretic with unobtainable BP and HR of 220.  Patient was cardioverted at 100J in the field to sinus rhythm and brought to [facility] on 7/12.  Was discharged from ED with cardio follow up (Dr Ha.)  Patient was started on xarelto by cardio today in office.  Developed dyspnea at home again, had a nebulizer and developed palpitations again and called EMS.  Currently has no active complaints.  Tachy to 130 in ED, labs show mildly elevated troponin level.          IMPROVE VTE Individual Risk Assessment          RISK                                                          Points  [  ] Previous VTE                                                3  [  ] Thrombophilia                                             2  [  ] Lower limb paralysis                                    2        (unable to hold up >15 seconds)    [  ] Current Cancer                                             2         (within 6 months)  [  ] Immobilization > 24 hrs                              1  [  ] ICU/CCU stay > 24 hours                            1  [  ] Age > 60                                                    1    IMPROVE VTE Score - 1
Simple: Patient demonstrates quick and easy understanding

## 2021-07-16 LAB
ALBUMIN SERPL ELPH-MCNC: 3.8 G/DL — SIGNIFICANT CHANGE UP (ref 3.3–5)
ALP SERPL-CCNC: 91 U/L — SIGNIFICANT CHANGE UP (ref 40–120)
ALT FLD-CCNC: 89 U/L — HIGH (ref 4–41)
ANION GAP SERPL CALC-SCNC: 16 MMOL/L — HIGH (ref 7–14)
AST SERPL-CCNC: 54 U/L — HIGH (ref 4–40)
BASOPHILS # BLD AUTO: 0.06 K/UL — SIGNIFICANT CHANGE UP (ref 0–0.2)
BASOPHILS NFR BLD AUTO: 1 % — SIGNIFICANT CHANGE UP (ref 0–2)
BILIRUB SERPL-MCNC: 1.7 MG/DL — HIGH (ref 0.2–1.2)
BUN SERPL-MCNC: 14 MG/DL — SIGNIFICANT CHANGE UP (ref 7–23)
CALCIUM SERPL-MCNC: 9.5 MG/DL — SIGNIFICANT CHANGE UP (ref 8.4–10.5)
CHLORIDE SERPL-SCNC: 96 MMOL/L — LOW (ref 98–107)
CK MB BLD-MCNC: 2.6 % — HIGH (ref 0–2.5)
CK MB CFR SERPL CALC: 3 NG/ML — SIGNIFICANT CHANGE UP
CK SERPL-CCNC: 115 U/L — SIGNIFICANT CHANGE UP (ref 30–200)
CO2 SERPL-SCNC: 26 MMOL/L — SIGNIFICANT CHANGE UP (ref 22–31)
CREAT SERPL-MCNC: 1.21 MG/DL — SIGNIFICANT CHANGE UP (ref 0.5–1.3)
EOSINOPHIL # BLD AUTO: 0.4 K/UL — SIGNIFICANT CHANGE UP (ref 0–0.5)
EOSINOPHIL NFR BLD AUTO: 6.7 % — HIGH (ref 0–6)
GLUCOSE SERPL-MCNC: 72 MG/DL — SIGNIFICANT CHANGE UP (ref 70–99)
HCT VFR BLD CALC: 51.8 % — HIGH (ref 39–50)
HGB BLD-MCNC: 17.2 G/DL — HIGH (ref 13–17)
IANC: 2.76 K/UL — SIGNIFICANT CHANGE UP (ref 1.5–8.5)
IMM GRANULOCYTES NFR BLD AUTO: 0.2 % — SIGNIFICANT CHANGE UP (ref 0–1.5)
LYMPHOCYTES # BLD AUTO: 2.05 K/UL — SIGNIFICANT CHANGE UP (ref 1–3.3)
LYMPHOCYTES # BLD AUTO: 34.3 % — SIGNIFICANT CHANGE UP (ref 13–44)
MAGNESIUM SERPL-MCNC: 1.3 MG/DL — LOW (ref 1.6–2.6)
MAGNESIUM SERPL-MCNC: 1.9 MG/DL — SIGNIFICANT CHANGE UP (ref 1.6–2.6)
MCHC RBC-ENTMCNC: 28.5 PG — SIGNIFICANT CHANGE UP (ref 27–34)
MCHC RBC-ENTMCNC: 33.2 GM/DL — SIGNIFICANT CHANGE UP (ref 32–36)
MCV RBC AUTO: 85.8 FL — SIGNIFICANT CHANGE UP (ref 80–100)
MONOCYTES # BLD AUTO: 0.69 K/UL — SIGNIFICANT CHANGE UP (ref 0–0.9)
MONOCYTES NFR BLD AUTO: 11.6 % — SIGNIFICANT CHANGE UP (ref 2–14)
NEUTROPHILS # BLD AUTO: 2.76 K/UL — SIGNIFICANT CHANGE UP (ref 1.8–7.4)
NEUTROPHILS NFR BLD AUTO: 46.2 % — SIGNIFICANT CHANGE UP (ref 43–77)
NRBC # BLD: 0 /100 WBCS — SIGNIFICANT CHANGE UP
NRBC # FLD: 0 K/UL — SIGNIFICANT CHANGE UP
PHOSPHATE SERPL-MCNC: 3.9 MG/DL — SIGNIFICANT CHANGE UP (ref 2.5–4.5)
PLATELET # BLD AUTO: 121 K/UL — LOW (ref 150–400)
POTASSIUM SERPL-MCNC: 3.6 MMOL/L — SIGNIFICANT CHANGE UP (ref 3.5–5.3)
POTASSIUM SERPL-SCNC: 3.6 MMOL/L — SIGNIFICANT CHANGE UP (ref 3.5–5.3)
PROT SERPL-MCNC: 6.6 G/DL — SIGNIFICANT CHANGE UP (ref 6–8.3)
RBC # BLD: 6.04 M/UL — HIGH (ref 4.2–5.8)
RBC # FLD: 14.7 % — HIGH (ref 10.3–14.5)
SODIUM SERPL-SCNC: 138 MMOL/L — SIGNIFICANT CHANGE UP (ref 135–145)
TROPONIN T, HIGH SENSITIVITY RESULT: 12 NG/L — SIGNIFICANT CHANGE UP
WBC # BLD: 5.97 K/UL — SIGNIFICANT CHANGE UP (ref 3.8–10.5)
WBC # FLD AUTO: 5.97 K/UL — SIGNIFICANT CHANGE UP (ref 3.8–10.5)

## 2021-07-16 PROCEDURE — 99233 SBSQ HOSP IP/OBS HIGH 50: CPT

## 2021-07-16 PROCEDURE — 99222 1ST HOSP IP/OBS MODERATE 55: CPT

## 2021-07-16 PROCEDURE — ZZZZZ: CPT

## 2021-07-16 RX ORDER — FUROSEMIDE 40 MG
40 TABLET ORAL
Refills: 0 | Status: DISCONTINUED | OUTPATIENT
Start: 2021-07-16 | End: 2021-07-16

## 2021-07-16 RX ORDER — FUROSEMIDE 40 MG
40 TABLET ORAL
Refills: 0 | Status: DISCONTINUED | OUTPATIENT
Start: 2021-07-16 | End: 2021-07-21

## 2021-07-16 RX ORDER — MAGNESIUM SULFATE 500 MG/ML
2 VIAL (ML) INJECTION ONCE
Refills: 0 | Status: COMPLETED | OUTPATIENT
Start: 2021-07-16 | End: 2021-07-16

## 2021-07-16 RX ORDER — AMIODARONE HYDROCHLORIDE 400 MG/1
200 TABLET ORAL
Refills: 0 | Status: DISCONTINUED | OUTPATIENT
Start: 2021-07-16 | End: 2021-07-21

## 2021-07-16 RX ORDER — RIVAROXABAN 15 MG-20MG
20 KIT ORAL
Refills: 0 | Status: DISCONTINUED | OUTPATIENT
Start: 2021-07-16 | End: 2021-07-21

## 2021-07-16 RX ORDER — METOPROLOL TARTRATE 50 MG
25 TABLET ORAL
Refills: 0 | Status: DISCONTINUED | OUTPATIENT
Start: 2021-07-16 | End: 2021-07-21

## 2021-07-16 RX ADMIN — Medication 50 GRAM(S): at 05:06

## 2021-07-16 RX ADMIN — AMIODARONE HYDROCHLORIDE 200 MILLIGRAM(S): 400 TABLET ORAL at 17:01

## 2021-07-16 RX ADMIN — AMIODARONE HYDROCHLORIDE 200 MILLIGRAM(S): 400 TABLET ORAL at 03:57

## 2021-07-16 RX ADMIN — Medication 25 MILLIGRAM(S): at 17:01

## 2021-07-16 RX ADMIN — RIVAROXABAN 20 MILLIGRAM(S): KIT at 17:01

## 2021-07-16 RX ADMIN — Medication 25 MILLIGRAM(S): at 02:30

## 2021-07-16 NOTE — CHART NOTE - NSCHARTNOTEFT_GEN_A_CORE
Sign out given by MAR -patient transferred in from [place] for palpitations aflutter. 12lead EKG showed ? ST elevation in aVL as per MAR and cardiology was consulted and waiting to see the patient as per sign out. Patient denies any CP/ SOB at this time. Primary RN notified that patient still has palpitations. Patient received metoprolol 25 mg early, Advised to give am dose of  amiodarone also to give now. Assessed the patient @ bedside .Repeat 12 lead noted as patient in SR 70s. Patient denies any palpitations at this time Will continue

## 2021-07-16 NOTE — PROVIDER CONTACT NOTE (OTHER) - SITUATION
patient transferred from [place] for angiogram in AM. patient has no H&P from [facility]. patient has h&P from [place] and accepting physican at [facility] Dr. Whitt wrote a transfer note. patient transferred from [place] for ablation in AM. patient has no H&P from [facility]. patient has h&P from [place] and accepting physican at [facility] Dr. Whitt wrote a transfer note.

## 2021-07-16 NOTE — CONSULT NOTE ADULT - SUBJECTIVE AND OBJECTIVE BOX
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     CIGARETTES: never  DRUGS:  never  ALCOHOL: never    REVIEW OF SYSTEMS:    CONSTITUTIONAL: No fever, weight loss, chills, shakes, or fatigue  EYES: No eye pain, visual disturbances, or discharge  ENMT:  No difficulty hearing, tinnitus, vertigo; No sinus or throat pain  NECK: No pain or stiffness  BREASTS: No pain, masses, or nipple discharge  RESPIRATORY: No cough, wheezing, hemoptysis, or shortness of breath  CARDIOVASCULAR: No chest pain, dyspnea, palpitations, dizziness, syncope, paroxysmal nocturnal dyspnea, orthopnea, or arm or leg swelling  GASTROINTESTINAL: No abdominal  or epigastric pain, nausea, vomiting, hematemesis, diarrhea, constipation, melena or bright red blood.  GENITOURINARY: No dysuria, nocturia, hematuria, or urinary incontinence  NEUROLOGICAL: No headaches, memory loss, slurred speech, limb weakness, loss of strength, numbness, or tremors  SKIN: No itching, burning, rashes, or lesions   LYMPH NODES: No enlarged glands  ENDOCRINE: No heat or cold intolerance, or hair loss  MUSCULOSKELETAL: No joint pain or swelling, muscle, back, or extremity pain  PSYCHIATRIC: No depression, anxiety, or difficulty sleeping  HEME/LYMPH: No easy bruising or bleeding gums  ALLERY AND IMMUNOLOGIC: No hives or rash.      Vital Signs Last 24 Hrs  T(C): 36.8 (2021 12:38), Max: 37 (2021 05:09)  T(F): 98.2 (2021 12:38), Max: 98.6 (2021 05:09)  HR: 80 (2021 12:38) (75 - 125)  BP: 123/83 (2021 12:38) (107/89 - 134/102)  BP(mean): --  RR: 17 (2021 12:38) (15 - 19)  SpO2: 100% (2021 12:38) (96% - 100%)    PHYSICAL EXAM:    GENERAL: In no apparent distress, well nourished, and hydrated.  HEAD:  Atraumatic, Normocephalic  EYES: EOMI, PERRLA, conjunctiva and sclera clear  ENMT: No tonsillar erythema, exudates, or enlargement; Moist mucous membranes, One front tooth missing. No lesions  NECK: Supple and normal thyroid.  No JVD or carotid bruit.   HEART: Regular rate and rhythm; No murmurs, rubs, or gallops.  PULMONARY: Expiratory wheezing heard throughout posterior lobes.   ABDOMEN: Soft, Nontender, nondistended; Bowel sounds present  EXTREMITIES:  2+ Peripheral Pulses, No clubbing, cyanosis, or edema  LYMPH: No lymphadenopathy noted  NEUROLOGICAL: Grossly nonfocal          INTERPRETATION OF TELEMETRY: Normal sinus rhythm 70's. Episode of atrial flutter w/RVR (127)    ECG:    Atrial flutter (2:1 block) Septal q waves. Biatrial enlargement. MA interval 144ms. QRSd 78ms   QTc 470ms.     I&O's Detail      LABS:                        17.2   5.97  )-----------( 121      ( 2021 03:41 )             51.8     07-16    138  |  96<L>  |  14  ----------------------------<  72  3.6   |  26  |  1.21    Ca    9.5      2021 03:41  Phos  3.9     07-16  Mg     1.90     07-16    TPro  6.6  /  Alb  3.8  /  TBili  1.7<H>  /  DBili  x   /  AST  54<H>  /  ALT  89<H>  /  AlkPhos  91  07-16    CARDIAC MARKERS ( 2021 03:41 )  x     / x     / 115 U/L / x     / 3.0 ng/mL    Daily Height in cm: 180.34 (15 Jul 2021 22:23)    Daily     RADIOLOGY & ADDITIONAL STUDIES:  PREVIOUS DIAGNOSTIC TESTING:      ECHO  FINDINGS:  < from: TTE with Doppler (w/Cont) (19 @ 19:38) >  Patient name: RACHEL SARAH  YOB: 1955   Age: 63 (M)   MR#: [id number]  Study Date: 2019  Location: [place] [id number]: Gabby Valencia [facility]  Study quality: Technically fair  Referring Physician: Williams Pineda MD  Blood Pressure: 119/82 mmHg  Height: 180 cm  Weight: 79 kg  BSA: 2 m2  Heart Rate: 76 mmHg  ------------------------------------------------------------------------  PROCEDURE: Transthoracic echocardiogram with 2-D, M-Mode  and complete spectral and color flow Doppler. Verbal  consent was obtained for injection of  Ultrasonic Enhancing  Agent following a discussion of risks and benefits.  Following intravenous injection of Ultrasonic Enhancing  Agent , harmonic imaging was performed.  INDICATION: Abnormal electrocardiogram (ECG) (EKG) (R94.31)  ------------------------------------------------------------------------  Dimensions:    Normal Values:  LA:     3.9    2.0 - 4.0 cm  Ao:     2.7    2.0 - 3.8 cm  SEPTUM: 0.8    0.6 - 1.2 cm  PWT:    1.0  0.6 - 1.1 cm  LVIDd:  4.7    3.0 - 5.6 cm  LVIDs:  3.3    1.8 - 4.0 cm  Derived variables:  LVMI: 72 g/m2  RWT: 0.42  Fractional short: 30 %  EF (Visual Estimate): 35-40 %  Doppler Peak Velocity (m/sec): AoV=0.8 TV=2.5  ------------------------------------------------------------------------  Observations:  Mitral Valve: Mitral annular calcification, otherwise  normal mitral valve. Mild-moderate mitral regurgitation.  Aortic Valve/Aorta: Calcified trileaflet aortic valve with  normal opening. Peak transaortic valve gradient equals 3 mm  Hg. Minimal aortic regurgitation.  Peak left ventricular  outflow tract gradient equals 1 mm Hg.  Aortic Root: 2.7 cm.  Left Atrium: Normal left atrium.  LA volume index = 31  cc/m2.  Left Ventricle: Moderate global left ventricular systolic  < from: TTE with Doppler (w/Cont) (19 @ 19:38) >  dysfunction.Endocardial visualization enhanced with  intravenous injection of Ultrasonic Enhancing Agent  (Definity). Normal left ventricular internal dimensions and  wall thicknesses. Normal diastolic function  Right Heart: Moderate right atrial enlargement. Right  ventricular enlargement with decreased right ventricular  systolic function. Normal tricuspid valve. Moderate-severe  tricuspid regurgitation. Pulmonic valve not well  visualized. Minimal pulmonic regurgitation.  Pericardium/Pleura: Normal pericardium with no pericardial  effusion.  Hemodynamic: Estimated right atrial pressure is 8 mm Hg.  Estimated right ventricular systolic pressure equals 33 mm  Hg, assuming right atrial pressure equals 8 mm Hg,  consistent with normal pulmonary pressures.  ------------------------------------------------------------------------  Conclusions:  1. Mitral annular calcification, otherwise normal mitral  valve. Mild-moderate mitral regurgitation.  2. Normal left ventricular internal dimensions and wall  thicknesses.  3. Moderate global left ventricular systolic  dysfunction.Endocardial visualization enhanced with  intravenous injection of Ultrasonic Enhancing Agent  (Definity).  4. Moderate right atrial enlargement.  5. Right ventricular enlargement with decreased right  ventricular systolic function.  6. Estimated right ventricular systolic pressure equals 33  mm Hg, assuming right atrial pressure equals 8 mm Hg,  consistent with normal pulmonary pressures.  7. Normal tricuspid valve. Moderate-severe tricuspid  regurgitation.  < from: TTE with Doppler (w/Cont) (19 @ 19:38) >  * No previous Echo exam.  ------------------------------------------------------------------------  Confirmed on  2019 - 21:12:13 by Jimmy Al M.D.  ----        STRESS  FINDINGS:    CATHETERIZATION  FINDINGS:  < from: Cardiac Cath Lab - Adult (19 @ 15:30) >  [facility]  Department of Cardiology  [address] [place] [address]  [phone]  Cath Lab Report -- Comprehensive Report  Patient: RACHEL SARAH  Study date: 2019  Account number: [id number]  MR number: [id number]  : 1955  Gender: Male  Race:  Amer  Case Physician(s):  Kota Mandujano M.D.  Fellow:  Judson Hester M.D.  Referring Physician:  Williams Pineda M.D.  INDICATIONS: Unstable angina - CCS3.  HISTORY: There was no prior cardiac history. The patient has hypertension,  medication-treated dyslipidemia, and a family history of coronary artery  disease.  PROCEDURE:  --  Left coronary angiography.  --  Right coronary angiography.  TECHNIQUE: The risks and alternatives of the procedures and conscious  sedation were explained to the patient and informed consent was obtained.  Cardiac catheterization performed electively.  Local anesthetic given. Right radial artery access. A 6FR PRELUDE KIT was  inserted in the vessel. Left coronary artery angiography. The vessel was  injected utilizing a DXTERITY ULTRA 4.0 catheter and positioned in the  vessel ostia under fluoroscopic guidance. Angiography was performed in  multiple projections using hand-injection of contrast. Right coronary  artery angiography. The vessel was injected utilizing a DXTERITY ULTRA 4.0  catheter and positioned in the vessel ostia under fluoroscopic guidance.  Angiography was performed in multiple projections using hand-injection of  contrast. RADIATION EXPOSURE: 2.5 min.  CONTRAST GIVEN: Omnipaque 35 ml.  MEDICATIONS GIVEN: Fentanyl, 25 mcg, IV. Midazolam, 1 mg, IV. Heparin, 3000  units, IV. Cardene, 500 mcg.  VENTRICLES: No left ventriculogram was performed.  CORONARY VESSELS: The coronary circulation is right dominant.  LM:   --  LM: Normal.  LAD:   --  LAD: Normal.  CX:   --  Circumflex: Normal.  < from: Cardiac Cath Lab - Adult (19 @ 15:30) >  RCA:   --  RCA: Normal.  COMPLICATIONS: There were no complications.  DIAGNOSTIC RECOMMENDATIONS: Medical management is recommended.  Prepared and signed by  Kota Mandujano M.D.  Signed 2019 18:16:56             Patient is a 65y old male transferred from [facility] where he was admitted for   65M with pmhx Afib on Xarelto, h/o ablation for afib (at [facility], in 2019), present to [facility] c/o palpitation X1 day (). He states starting Monday he felt palpitations which associated with diaphoresis and transient SOB and nausea. He initially BIBEMS to [facility] and was cardioverted with 100J by EMS on Monday(). Then he was started on Xarelto & discharged with plan for Cardiology follow up with Dr. Ha. On Tuesday(), He felt dyspnea and took nebulizer without relief and developed palpitations again. He denies CP, lightheadedness, dizziness, vomiting, dyuria, diarrhea, fever or chills. On , he was admitted to [facility] and now transferred to [facility] for possible ablation.     Of note, pt was admitted on  with a WCT in the setting of chest tightness and palpitations and cardiomyopathy with LVEF 35-40%; treated with amiodarone 150mg IVP and placed on gtt. Transferred to [facility] where he underwent cath to r/o CAD- normal cors, and subsuquently underwent ablation for what turned out to be atrial flutter with aberrancy.    Admit to 709B, /95, HR 78, RR16, O2sat 100% on room air. Received amio 200mg, [id number], mag 2gm iv. EKG shows Aflutter with aberrancy, . Cardiology consult called with concern for ST changes in EKG. Repeat EKG shows NSR HR 73 with no acute ischemic changes.           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     CIGARETTES: never  DRUGS:  never  ALCOHOL: never    REVIEW OF SYSTEMS:    CONSTITUTIONAL: No fever, weight loss, chills, shakes, or fatigue  EYES: No eye pain, visual disturbances, or discharge  ENMT:  No difficulty hearing, tinnitus, vertigo; No sinus or throat pain  NECK: No pain or stiffness  BREASTS: No pain, masses, or nipple discharge  RESPIRATORY: No cough, wheezing, hemoptysis, or shortness of breath  CARDIOVASCULAR: No chest pain, dyspnea, palpitations, dizziness, syncope, paroxysmal nocturnal dyspnea, orthopnea, or arm or leg swelling  GASTROINTESTINAL: No abdominal  or epigastric pain, nausea, vomiting, hematemesis, diarrhea, constipation, melena or bright red blood.  GENITOURINARY: No dysuria, nocturia, hematuria, or urinary incontinence  NEUROLOGICAL: No headaches, memory loss, slurred speech, limb weakness, loss of strength, numbness, or tremors  SKIN: No itching, burning, rashes, or lesions   LYMPH NODES: No enlarged glands  ENDOCRINE: No heat or cold intolerance, or hair loss  MUSCULOSKELETAL: No joint pain or swelling, muscle, back, or extremity pain  PSYCHIATRIC: No depression, anxiety, or difficulty sleeping  HEME/LYMPH: No easy bruising or bleeding gums  ALLERY AND IMMUNOLOGIC: No hives or rash.      Vital Signs Last 24 Hrs  T(C): 36.8 (2021 12:38), Max: 37 (2021 05:09)  T(F): 98.2 (2021 12:38), Max: 98.6 (2021 05:09)  HR: 80 (2021 12:38) (75 - 125)  BP: 123/83 (2021 12:38) (107/89 - 134/102)  BP(mean): --  RR: 17 (2021 12:38) (15 - 19)  SpO2: 100% (2021 12:38) (96% - 100%)    PHYSICAL EXAM:    GENERAL: In no apparent distress, well nourished, and hydrated.  HEAD:  Atraumatic, Normocephalic  EYES: EOMI, PERRLA, conjunctiva and sclera clear  ENMT: No tonsillar erythema, exudates, or enlargement; Moist mucous membranes, One front tooth missing. No lesions  NECK: Supple and normal thyroid.  No JVD or carotid bruit.   HEART: Regular rate and rhythm; No murmurs, rubs, or gallops.  PULMONARY: Expiratory wheezing heard throughout posterior lobes.   ABDOMEN: Soft, Nontender, nondistended; Bowel sounds present  EXTREMITIES:  2+ Peripheral Pulses, No clubbing, cyanosis, or edema  LYMPH: No lymphadenopathy noted  NEUROLOGICAL: Grossly nonfocal          INTERPRETATION OF TELEMETRY: Normal sinus rhythm 70's. Episode of atrial flutter w/RVR (127)    ECG:    Atrial flutter (2:1 block) Septal q waves. Biatrial enlargement. RI interval 144ms. QRSd 78ms   QTc 470ms.     I&O's Detail      LABS:                        17.2   5.97  )-----------( 121      ( 2021 03:41 )             51.8     07-16    138  |  96<L>  |  14  ----------------------------<  72  3.6   |  26  |  1.21    Ca    9.5      2021 03:41  Phos  3.9     07-16  Mg     1.90     07-16    TPro  6.6  /  Alb  3.8  /  TBili  1.7<H>  /  DBili  x   /  AST  54<H>  /  ALT  89<H>  /  AlkPhos  91  07-16    CARDIAC MARKERS ( 2021 03:41 )  x     / x     / 115 U/L / x     / 3.0 ng/mL    Daily Height in cm: 180.34 (15 Jul 2021 22:23)    Daily     RADIOLOGY & ADDITIONAL STUDIES:  PREVIOUS DIAGNOSTIC TESTING:      ECHO  FINDINGS:  < from: TTE with Doppler (w/Cont) (19 @ 19:38) >  Patient name: RACHEL SARAH  YOB: 1955   Age: 63 (M)   MR#: [id number]  Study Date: 2019  Location: [place] [id number]: Gabby Valencia RDCS  Study quality: Technically fair  Referring Physician: Williams Pineda MD  Blood Pressure: 119/82 mmHg  Height: 180 cm  Weight: 79 kg  BSA: 2 m2  Heart Rate: 76 mmHg  ------------------------------------------------------------------------  PROCEDURE: Transthoracic echocardiogram with 2-D, M-Mode  and complete spectral and color flow Doppler. Verbal  consent was obtained for injection of  Ultrasonic Enhancing  Agent following a discussion of risks and benefits.  Following intravenous injection of Ultrasonic Enhancing  Agent , harmonic imaging was performed.  INDICATION: Abnormal electrocardiogram (ECG) (EKG) (R94.31)  ------------------------------------------------------------------------  Dimensions:    Normal Values:  LA:     3.9    2.0 - 4.0 cm  Ao:     2.7    2.0 - 3.8 cm  SEPTUM: 0.8    0.6 - 1.2 cm  PWT:    1.0  0.6 - 1.1 cm  LVIDd:  4.7    3.0 - 5.6 cm  LVIDs:  3.3    1.8 - 4.0 cm  Derived variables:  LVMI: 72 g/m2  RWT: 0.42  Fractional short: 30 %  EF (Visual Estimate): 35-40 %  Doppler Peak Velocity (m/sec): AoV=0.8 TV=2.5  ------------------------------------------------------------------------  Observations:  Mitral Valve: Mitral annular calcification, otherwise  normal mitral valve. Mild-moderate mitral regurgitation.  Aortic Valve/Aorta: Calcified trileaflet aortic valve with  normal opening. Peak transaortic valve gradient equals 3 mm  Hg. Minimal aortic regurgitation.  Peak left ventricular  outflow tract gradient equals 1 mm Hg.  Aortic Root: 2.7 cm.  Left Atrium: Normal left atrium.  LA volume index = 31  cc/m2.  Left Ventricle: Moderate global left ventricular systolic  < from: TTE with Doppler (w/Cont) (19 @ 19:38) >  dysfunction.Endocardial visualization enhanced with  intravenous injection of Ultrasonic Enhancing Agent  (Definity). Normal left ventricular internal dimensions and  wall thicknesses. Normal diastolic function  Right Heart: Moderate right atrial enlargement. Right  ventricular enlargement with decreased right ventricular  systolic function. Normal tricuspid valve. Moderate-severe  tricuspid regurgitation. Pulmonic valve not well  visualized. Minimal pulmonic regurgitation.  Pericardium/Pleura: Normal pericardium with no pericardial  effusion.  Hemodynamic: Estimated right atrial pressure is 8 mm Hg.  Estimated right ventricular systolic pressure equals 33 mm  Hg, assuming right atrial pressure equals 8 mm Hg,  consistent with normal pulmonary pressures.  ------------------------------------------------------------------------  Conclusions:  1. Mitral annular calcification, otherwise normal mitral  valve. Mild-moderate mitral regurgitation.  2. Normal left ventricular internal dimensions and wall  thicknesses.  3. Moderate global left ventricular systolic  dysfunction.Endocardial visualization enhanced with  intravenous injection of Ultrasonic Enhancing Agent  (Definity).  4. Moderate right atrial enlargement.  5. Right ventricular enlargement with decreased right  ventricular systolic function.  6. Estimated right ventricular systolic pressure equals 33  mm Hg, assuming right atrial pressure equals 8 mm Hg,  consistent with normal pulmonary pressures.  7. Normal tricuspid valve. Moderate-severe tricuspid  regurgitation.  < from: TTE with Doppler (w/Cont) (19 @ 19:38) >  * No previous Echo exam.  ------------------------------------------------------------------------  Confirmed on  2019 - 21:12:13 by Jimmy Al M.D.  ----        STRESS  FINDINGS:    CATHETERIZATION  FINDINGS:  < from: Cardiac Cath Lab - Adult (19 @ 15:30) >  [facility]  Department of Cardiology  [address] [place] [address]  [phone]  Cath Lab Report -- Comprehensive Report  Patient: RACHEL SARAH  Study date: 2019  Account number: [id number]  MR number: [id number]  : 1955  Gender: Male  Race:  Amer  Case Physician(s):  Kota Mandujano M.D.  Fellow:  Judson Hester M.D.  Referring Physician:  Williams Pineda M.D.  INDICATIONS: Unstable angina - CCS3.  HISTORY: There was no prior cardiac history. The patient has hypertension,  medication-treated dyslipidemia, and a family history of coronary artery  disease.  PROCEDURE:  --  Left coronary angiography.  --  Right coronary angiography.  TECHNIQUE: The risks and alternatives of the procedures and conscious  sedation were explained to the patient and informed consent was obtained.  Cardiac catheterization performed electively.  Local anesthetic given. Right radial artery access. A 6FR PRELUDE KIT was  inserted in the vessel. Left coronary artery angiography. The vessel was  injected utilizing a DXTERITY ULTRA 4.0 catheter and positioned in the  vessel ostia under fluoroscopic guidance. Angiography was performed in  multiple projections using hand-injection of contrast. Right coronary  artery angiography. The vessel was injected utilizing a DXTERITY ULTRA 4.0  catheter and positioned in the vessel ostia under fluoroscopic guidance.  Angiography was performed in multiple projections using hand-injection of  contrast. RADIATION EXPOSURE: 2.5 min.  CONTRAST GIVEN: Omnipaque 35 ml.  MEDICATIONS GIVEN: Fentanyl, 25 mcg, IV. Midazolam, 1 mg, IV. Heparin, 3000  units, IV. Cardene, 500 mcg.  VENTRICLES: No left ventriculogram was performed.  CORONARY VESSELS: The coronary circulation is right dominant.  LM:   --  LM: Normal.  LAD:   --  LAD: Normal.  CX:   --  Circumflex: Normal.  < from: Cardiac Cath Lab - Adult (19 @ 15:30) >  RCA:   --  RCA: Normal.  COMPLICATIONS: There were no complications.  DIAGNOSTIC RECOMMENDATIONS: Medical management is recommended.  Prepared and signed by  Kota Mandujano M.D.  Signed 2019 18:16:56             65M with pmhx asthma and atrial fibrillation s/p ablation at [facility], in 2019, off anticoagulation until 3 days ago (now on Xarelto) who presented to [facility] c/o palpitation X1 month ().  Palpitations were associated with diaphoresis, transient SOB and nausea. He initially BIBEMS to [facility] ED 21 and was cardioverted with 100J by EMS for hemodynamic instability. He was started on Xarelto & discharged with plan for cardiology follow up with Dr. Ha. On Tuesday(), he felt dyspneic and took nebulizer, thinking it was his asthma, without relief.  On , he was admitted to [facility] and now transferred to [facility] for possible ablation.   Of note, pt was admitted on  with a WCT in the setting of chest tightness and palpitations and cardiomyopathy with LVEF 35-40%; treated with amiodarone 150mg IVP and infusion. . Transferred to [facility] where he underwent cardiac cath which demonstrated normal coronaries.  He subsuquently underwent ablation for what turned out to be atrial flutter with aberrancy.    Admit to 709B, /95, HR 78, RR16, O2sat 100% on room air. Received amiodarone 200mg, metoprolol 25mg, magnesium 2gm IV.   EKG showed Aflutter (typical) with aberrancy, . Cardiology consult called with concern for ST changes in EKG. Repeat EKG shows NSR HR 73 with no acute ischemic changes.   EP consulted for AFib/flutter ablation.         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     CIGARETTES: never  DRUGS:  never  ALCOHOL: never    REVIEW OF SYSTEMS:    CONSTITUTIONAL: No fever, weight loss, chills, shakes, or fatigue  EYES: No eye pain, visual disturbances, or discharge  ENMT:  No difficulty hearing, tinnitus, vertigo; No sinus or throat pain  NECK: No pain or stiffness  BREASTS: No pain, masses, or nipple discharge  RESPIRATORY: No cough, wheezing, hemoptysis, or shortness of breath  CARDIOVASCULAR: No chest pain, dyspnea, palpitations, dizziness, syncope, paroxysmal nocturnal dyspnea, orthopnea, or arm or leg swelling  GASTROINTESTINAL: No abdominal  or epigastric pain, nausea, vomiting, hematemesis, diarrhea, constipation, melena or bright red blood.  GENITOURINARY: No dysuria, nocturia, hematuria, or urinary incontinence  NEUROLOGICAL: No headaches, memory loss, slurred speech, limb weakness, loss of strength, numbness, or tremors  SKIN: No itching, burning, rashes, or lesions   LYMPH NODES: No enlarged glands  ENDOCRINE: No heat or cold intolerance, or hair loss  MUSCULOSKELETAL: No joint pain or swelling, muscle, back, or extremity pain  PSYCHIATRIC: No depression, anxiety, or difficulty sleeping  HEME/LYMPH: No easy bruising or bleeding gums  ALLERGY AND IMMUNOLOGIC: No hives or rash.      Vital Signs Last 24 Hrs  T(C): 36.8 (2021 12:38), Max: 37 (2021 05:09)  T(F): 98.2 (2021 12:38), Max: 98.6 (2021 05:09)  HR: 80 (2021 12:38) (75 - 125)  BP: 123/83 (2021 12:38) (107/89 - 134/102)  BP(mean): --  RR: 17 (2021 12:38) (15 - 19)  SpO2: 100% (2021 12:38) (96% - 100%)    PHYSICAL EXAM:    GENERAL: In no apparent distress, well nourished, and hydrated.  HEAD:  Atraumatic, Normocephalic  EYES: EOMI, PERRLA, conjunctiva and sclera clear  ENMT: No tonsillar erythema, exudates, or enlargement; Moist mucous membranes, One front tooth missing. No lesions  NECK: Supple and normal thyroid.  No JVD or carotid bruit.   HEART: Regular rate and rhythm; No murmurs, rubs, or gallops.  PULMONARY: Expiratory wheezing heard throughout posterior lobes.   ABDOMEN: Soft, Nontender, nondistended; Bowel sounds present  EXTREMITIES:  2+ Peripheral Pulses, No clubbing, cyanosis, or edema  LYMPH: No lymphadenopathy noted  NEUROLOGICAL: Grossly nonfocal          INTERPRETATION OF TELEMETRY: Normal sinus rhythm 70's. Episode of atrial flutter w/RVR (127)    ECG:    Atrial flutter (2:1 block) Septal q waves. Biatrial enlargement. OK interval 144ms. QRSd 78ms   QTc 470ms.     I&O's Detail      LABS:                        17.2   5.97  )-----------( 121      ( 2021 03:41 )             51.8     07-16    138  |  96<L>  |  14  ----------------------------<  72  3.6   |  26  |  1.21    Ca    9.5      2021 03:41  Phos  3.9     07-16  Mg     1.90     07-16    TPro  6.6  /  Alb  3.8  /  TBili  1.7<H>  /  DBili  x   /  AST  54<H>  /  ALT  89<H>  /  AlkPhos  91  07-16    CARDIAC MARKERS ( 2021 03:41 )  x     / x     / 115 U/L / x     / 3.0 ng/mL    Daily Height in cm: 180.34 (15 Jul 2021 22:23)    Daily     RADIOLOGY & ADDITIONAL STUDIES:  PREVIOUS DIAGNOSTIC TESTING:      ECHO  FINDINGS:  < from: TTE with Doppler (w/Cont) (19 @ 19:38) >  Patient name: RACHEL SARAH  YOB: 1955   Age: 63 (M)   MR#: [id number]  Study Date: 2019  Location: [place] [id number]: [place] ALICE  Study quality: Technically fair  Referring Physician: Williams Pineda MD  Blood Pressure: 119/82 mmHg  Height: 180 cm  Weight: 79 kg  BSA: 2 m2  Heart Rate: 76 mmHg  ------------------------------------------------------------------------  PROCEDURE: Transthoracic echocardiogram with 2-D, M-Mode  and complete spectral and color flow Doppler. Verbal  consent was obtained for injection of  Ultrasonic Enhancing  Agent following a discussion of risks and benefits.  Following intravenous injection of Ultrasonic Enhancing  Agent , harmonic imaging was performed.  INDICATION: Abnormal electrocardiogram (ECG) (EKG) (R94.31)  ------------------------------------------------------------------------  Dimensions:    Normal Values:  LA:     3.9    2.0 - 4.0 cm  Ao:     2.7    2.0 - 3.8 cm  SEPTUM: 0.8    0.6 - 1.2 cm  PWT:    1.0  0.6 - 1.1 cm  LVIDd:  4.7    3.0 - 5.6 cm  LVIDs:  3.3    1.8 - 4.0 cm  Derived variables:  LVMI: 72 g/m2  RWT: 0.42  Fractional short: 30 %  EF (Visual Estimate): 35-40 %  Doppler Peak Velocity (m/sec): AoV=0.8 TV=2.5  ------------------------------------------------------------------------  Observations:  Mitral Valve: Mitral annular calcification, otherwise  normal mitral valve. Mild-moderate mitral regurgitation.  Aortic Valve/Aorta: Calcified trileaflet aortic valve with  normal opening. Peak transaortic valve gradient equals 3 mm  Hg. Minimal aortic regurgitation.  Peak left ventricular  outflow tract gradient equals 1 mm Hg.  Aortic Root: 2.7 cm.  Left Atrium: Normal left atrium.  LA volume index = 31  cc/m2.  Left Ventricle: Moderate global left ventricular systolic  dysfunction.Endocardial visualization enhanced with  intravenous injection of Ultrasonic Enhancing Agent  (Definity). Normal left ventricular internal dimensions and  wall thicknesses. Normal diastolic function  Right Heart: Moderate right atrial enlargement. Right  ventricular enlargement with decreased right ventricular  systolic function. Normal tricuspid valve. Moderate-severe  tricuspid regurgitation. Pulmonic valve not well  visualized. Minimal pulmonic regurgitation.  Pericardium/Pleura: Normal pericardium with no pericardial  effusion.  Hemodynamic: Estimated right atrial pressure is 8 mm Hg.  Estimated right ventricular systolic pressure equals 33 mm  Hg, assuming right atrial pressure equals 8 mm Hg,  consistent with normal pulmonary pressures.  ------------------------------------------------------------------------  Conclusions:  1. Mitral annular calcification, otherwise normal mitral  valve. Mild-moderate mitral regurgitation.  2. Normal left ventricular internal dimensions and wall  thicknesses.  3. Moderate global left ventricular systolic  dysfunction.Endocardial visualization enhanced with  intravenous injection of Ultrasonic Enhancing Agent  (Definity).  4. Moderate right atrial enlargement.  5. Right ventricular enlargement with decreased right  ventricular systolic function.  6. Estimated right ventricular systolic pressure equals 33  mm Hg, assuming right atrial pressure equals 8 mm Hg,  consistent with normal pulmonary pressures.  7. Normal tricuspid valve. Moderate-severe tricuspid  regurgitation.  < from: TTE with Doppler (w/Cont) (19 @ 19:38) >  * No previous Echo exam.  ------------------------------------------------------------------------  Confirmed on  2019 - 21:12:13 by Jimmy Al M.D.  ----        STRESS  FINDINGS:    CATHETERIZATION  FINDINGS:  < from: Cardiac Cath Lab - Adult (19 @ 15:30) >  [facility]  Department of Cardiology  [address] [place], [place] [id number] [phone]  Cath Lab Report -- Comprehensive Report  Patient: RACHEL SARAH  Study date: 2019  Account number: [id number]  MR number: [id number]  : 1955  Gender: Male  Race:  Amer  Case Physician(s):  Kota Mandujano M.D.  Fellow:  Judson Hester M.D.  Referring Physician:  Williams Pineda M.D.  INDICATIONS: Unstable angina - CCS3.  HISTORY: There was no prior cardiac history. The patient has hypertension,  medication-treated dyslipidemia, and a family history of coronary artery  disease.  PROCEDURE:  --  Left coronary angiography.  --  Right coronary angiography.  TECHNIQUE: The risks and alternatives of the procedures and conscious  sedation were explained to the patient and informed consent was obtained.  Cardiac catheterization performed electively.  Local anesthetic given. Right radial artery access. A 6FR PRELUDE KIT was  inserted in the vessel. Left coronary artery angiography. The vessel was  injected utilizing a DXTERITY ULTRA 4.0 catheter and positioned in the  vessel ostia under fluoroscopic guidance. Angiography was performed in  multiple projections using hand-injection of contrast. Right coronary  artery angiography. The vessel was injected utilizing a DXTERITY ULTRA 4.0  catheter and positioned in the vessel ostia under fluoroscopic guidance.  Angiography was performed in multiple projections using hand-injection of  contrast. RADIATION EXPOSURE: 2.5 min.  CONTRAST GIVEN: Omnipaque 35 ml.  MEDICATIONS GIVEN: Fentanyl, 25 mcg, IV. Midazolam, 1 mg, IV. Heparin, 3000  units, IV. Cardene, 500 mcg.  VENTRICLES: No left ventriculogram was performed.  CORONARY VESSELS: The coronary circulation is right dominant.  LM:   --  LM: Normal.  LAD:   --  LAD: Normal.  CX:   --  Circumflex: Normal.  < from: Cardiac Cath Lab - Adult (19 @ 15:30) >  RCA:   --  RCA: Normal.  COMPLICATIONS: There were no complications.  DIAGNOSTIC RECOMMENDATIONS: Medical management is recommended.  Prepared and signed by  Kota Mandujano M.D.  Signed 2019 18:16:56             65M with pmhx asthma and atrial fibrillation s/p ablation at [facility], in 2019, off anticoagulation until 3 days ago (now on Xarelto) who presented to [facility] c/o palpitation X1 month ().  Palpitations were associated with diaphoresis, transient SOB and nausea. He initially BIBEMS to [facility] ED 21 and was successfully cardioverted to sinus rhythm with 100J by EMS for hemodynamic instability. He was started on Xarelto & discharged with plan for cardiology follow up with Dr. Ha. On Tuesday(), he felt dyspneic and took his nebulizer, thinking it was his asthma. But when there was no  relief. he presented [facility] where he was admitted with AFib/Flutter w/RVR. Transferred to [facility] for possible ablation.   Of note, pt was admitted on  with a WCT in the setting of chest tightness and palpitations and cardiomyopathy with LVEF 35-40%; treated with amiodarone 150mg IVP and infusion. . Transferred to [facility] where he underwent cardiac cath which demonstrated normal coronaries.  He subsuquently underwent ablation for what turned out to be atrial flutter with aberrancy.    Admit to 709B, /95, HR 78, RR16, O2sat 100% on room air. Received amiodarone 200mg, metoprolol 25mg, magnesium 2gm IV.  EKG showed Aflutter (typical) with aberrancy, . Cardiology consult called with concern for ST changes in EKG. Repeat EKG shows NSR HR 73 with no acute ischemic changes.   EP consulted for AFib/flutter ablation.         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     CIGARETTES: never  DRUGS:  never  ALCOHOL: never    REVIEW OF SYSTEMS:    CONSTITUTIONAL: No fever, weight loss, chills, shakes, or fatigue  EYES: No eye pain, visual disturbances, or discharge  ENMT:  No difficulty hearing, tinnitus, vertigo; No sinus or throat pain  NECK: No pain or stiffness  BREASTS: No pain, masses, or nipple discharge  RESPIRATORY: No cough, wheezing, hemoptysis, or shortness of breath  CARDIOVASCULAR: No chest pain, dyspnea, palpitations, dizziness, syncope, paroxysmal nocturnal dyspnea, orthopnea, or arm or leg swelling  GASTROINTESTINAL: No abdominal  or epigastric pain, nausea, vomiting, hematemesis, diarrhea, constipation, melena or bright red blood.  GENITOURINARY: No dysuria, nocturia, hematuria, or urinary incontinence  NEUROLOGICAL: No headaches, memory loss, slurred speech, limb weakness, loss of strength, numbness, or tremors  SKIN: No itching, burning, rashes, or lesions   LYMPH NODES: No enlarged glands  ENDOCRINE: No heat or cold intolerance, or hair loss  MUSCULOSKELETAL: No joint pain or swelling, muscle, back, or extremity pain  PSYCHIATRIC: No depression, anxiety, or difficulty sleeping  HEME/LYMPH: No easy bruising or bleeding gums  ALLERGY AND IMMUNOLOGIC: No hives or rash.      Vital Signs Last 24 Hrs  T(C): 36.8 (2021 12:38), Max: 37 (2021 05:09)  T(F): 98.2 (2021 12:38), Max: 98.6 (2021 05:09)  HR: 80 (2021 12:38) (75 - 125)  BP: 123/83 (2021 12:38) (107/89 - 134/102)  BP(mean): --  RR: 17 (2021 12:38) (15 - 19)  SpO2: 100% (2021 12:38) (96% - 100%)    PHYSICAL EXAM:    GENERAL: In no apparent distress, well nourished, and hydrated.  HEAD:  Atraumatic, Normocephalic  EYES: EOMI, PERRLA, conjunctiva and sclera clear  ENMT: No tonsillar erythema, exudates, or enlargement; Moist mucous membranes, One front tooth missing. No lesions  NECK: Supple and normal thyroid.  No JVD or carotid bruit.   HEART: Regular rate and rhythm; No murmurs, rubs, or gallops.  PULMONARY: Expiratory wheezing heard throughout posterior lobes.   ABDOMEN: Soft, Nontender, nondistended; Bowel sounds present  EXTREMITIES:  2+ Peripheral Pulses, No clubbing, cyanosis, or edema  LYMPH: No lymphadenopathy noted  NEUROLOGICAL: Grossly nonfocal          INTERPRETATION OF TELEMETRY: Normal sinus rhythm 70's. Episode of atrial flutter w/RVR (127)    ECG:    Atrial flutter (2:1 block) Septal q waves. Biatrial enlargement. WY interval 144ms. QRSd 78ms   QTc 470ms.     I&O's Detail      LABS:                        17.2   5.97  )-----------( 121      ( 2021 03:41 )             51.8     07-16    138  |  96<L>  |  14  ----------------------------<  72  3.6   |  26  |  1.21    Ca    9.5      2021 03:41  Phos  3.9     07-16  Mg     1.90     07-16    TPro  6.6  /  Alb  3.8  /  TBili  1.7<H>  /  DBili  x   /  AST  54<H>  /  ALT  89<H>  /  AlkPhos  91  07-16    CARDIAC MARKERS ( 2021 03:41 )  x     / x     / 115 U/L / x     / 3.0 ng/mL    Daily Height in cm: 180.34 (15 Jul 2021 22:23)    Daily     RADIOLOGY & ADDITIONAL STUDIES:  PREVIOUS DIAGNOSTIC TESTING:      ECHO  FINDINGS:  < from: TTE with Doppler (w/Cont) (19 @ 19:38) >  Patient name: RACHEL SARAH  YOB: 1955   Age: 63 (M)   MR#: [id number]  Study Date: 2019  Location: [place] [id number]: [place]by [place] ALICE  Study quality: Technically fair  Referring Physician: Williams Pineda MD  Blood Pressure: 119/82 mmHg  Height: 180 cm  Weight: 79 kg  BSA: 2 m2  Heart Rate: 76 mmHg  ------------------------------------------------------------------------  PROCEDURE: Transthoracic echocardiogram with 2-D, M-Mode  and complete spectral and color flow Doppler. Verbal  consent was obtained for injection of  Ultrasonic Enhancing  Agent following a discussion of risks and benefits.  Following intravenous injection of Ultrasonic Enhancing  Agent , harmonic imaging was performed.  INDICATION: Abnormal electrocardiogram (ECG) (EKG) (R94.31)  ------------------------------------------------------------------------  Dimensions:    Normal Values:  LA:     3.9    2.0 - 4.0 cm  Ao:     2.7    2.0 - 3.8 cm  SEPTUM: 0.8    0.6 - 1.2 cm  PWT:    1.0  0.6 - 1.1 cm  LVIDd:  4.7    3.0 - 5.6 cm  LVIDs:  3.3    1.8 - 4.0 cm  Derived variables:  LVMI: 72 g/m2  RWT: 0.42  Fractional short: 30 %  EF (Visual Estimate): 35-40 %  Doppler Peak Velocity (m/sec): AoV=0.8 TV=2.5  ------------------------------------------------------------------------  Observations:  Mitral Valve: Mitral annular calcification, otherwise  normal mitral valve. Mild-moderate mitral regurgitation.  Aortic Valve/Aorta: Calcified trileaflet aortic valve with  normal opening. Peak transaortic valve gradient equals 3 mm  Hg. Minimal aortic regurgitation.  Peak left ventricular  outflow tract gradient equals 1 mm Hg.  Aortic Root: 2.7 cm.  Left Atrium: Normal left atrium.  LA volume index = 31  cc/m2.  Left Ventricle: Moderate global left ventricular systolic  dysfunction.Endocardial visualization enhanced with  intravenous injection of Ultrasonic Enhancing Agent  (Definity). Normal left ventricular internal dimensions and  wall thicknesses. Normal diastolic function  Right Heart: Moderate right atrial enlargement. Right  ventricular enlargement with decreased right ventricular  systolic function. Normal tricuspid valve. Moderate-severe  tricuspid regurgitation. Pulmonic valve not well  visualized. Minimal pulmonic regurgitation.  Pericardium/Pleura: Normal pericardium with no pericardial  effusion.  Hemodynamic: Estimated right atrial pressure is 8 mm Hg.  Estimated right ventricular systolic pressure equals 33 mm  Hg, assuming right atrial pressure equals 8 mm Hg,  consistent with normal pulmonary pressures.  ------------------------------------------------------------------------  Conclusions:  1. Mitral annular calcification, otherwise normal mitral  valve. Mild-moderate mitral regurgitation.  2. Normal left ventricular internal dimensions and wall  thicknesses.  3. Moderate global left ventricular systolic  dysfunction.Endocardial visualization enhanced with  intravenous injection of Ultrasonic Enhancing Agent  (Definity).  4. Moderate right atrial enlargement.  5. Right ventricular enlargement with decreased right  ventricular systolic function.  6. Estimated right ventricular systolic pressure equals 33  mm Hg, assuming right atrial pressure equals 8 mm Hg,  consistent with normal pulmonary pressures.  7. Normal tricuspid valve. Moderate-severe tricuspid  regurgitation.  < from: TTE with Doppler (w/Cont) (19 @ 19:38) >  * No previous Echo exam.  ------------------------------------------------------------------------  Confirmed on  2019 - 21:12:13 by Jimmy Al M.D.  ----        STRESS  FINDINGS:    CATHETERIZATION  FINDINGS:  < from: Cardiac Cath Lab - Adult (19 @ 15:30) >  [facility]  Department of Cardiology  [address] [place] [address] [phone]  Cath Lab Report -- Comprehensive Report  Patient: RACHEL SARAH  Study date: 2019  Account number: [id number]  MR number: [id number]  : 1955  Gender: Male  Race:  Amer  Case Physician(s):  Kota Mandujano M.D.  Fellow:  Judson Hester M.D.  Referring Physician:  Williams Pineda M.D.  INDICATIONS: Unstable angina - CCS3.  HISTORY: There was no prior cardiac history. The patient has hypertension,  medication-treated dyslipidemia, and a family history of coronary artery  disease.  PROCEDURE:  --  Left coronary angiography.  --  Right coronary angiography.  TECHNIQUE: The risks and alternatives of the procedures and conscious  sedation were explained to the patient and informed consent was obtained.  Cardiac catheterization performed electively.  Local anesthetic given. Right radial artery access. A 6FR PRELUDE KIT was  inserted in the vessel. Left coronary artery angiography. The vessel was  injected utilizing a DXTERITY ULTRA 4.0 catheter and positioned in the  vessel ostia under fluoroscopic guidance. Angiography was performed in  multiple projections using hand-injection of contrast. Right coronary  artery angiography. The vessel was injected utilizing a DXTERITY ULTRA 4.0  catheter and positioned in the vessel ostia under fluoroscopic guidance.  Angiography was performed in multiple projections using hand-injection of  contrast. RADIATION EXPOSURE: 2.5 min.  CONTRAST GIVEN: Omnipaque 35 ml.  MEDICATIONS GIVEN: Fentanyl, 25 mcg, IV. Midazolam, 1 mg, IV. Heparin, 3000  units, IV. Cardene, 500 mcg.  VENTRICLES: No left ventriculogram was performed.  CORONARY VESSELS: The coronary circulation is right dominant.  LM:   --  LM: Normal.  LAD:   --  LAD: Normal.  CX:   --  Circumflex: Normal.  < from: Cardiac Cath Lab - Adult (19 @ 15:30) >  RCA:   --  RCA: Normal.  COMPLICATIONS: There were no complications.  DIAGNOSTIC RECOMMENDATIONS: Medical management is recommended.  Prepared and signed by  Kota Mandujano M.D.  Signed 2019 18:16:56

## 2021-07-16 NOTE — CONSULT NOTE ADULT - ATTENDING COMMENTS
Agree with above. Relatively low suspicion for ACS.
65M with pmhx asthma and atrial fibrillation s/p ablation at [facility], in 2019, off anticoagulation until 3 days ago (now on Xarelto) who presented to [facility] c/o palpitation X1 month (7/13).  Palpitations were associated with diaphoresis, transient SOB and nausea. He initially BIBEMS to [facility] ED 7/12/21 and was successfully cardioverted to sinus rhythm with 100J by EMS for hemodynamic instability. He was started on Xarelto & discharged with plan for cardiology follow up with Dr. Ha. On Tuesday(7/13), he felt dyspneic and took his nebulizer, thinking it was his asthma. But when there was no  relief. he presented [facility] where he was admitted with AFib/Flutter w/RVR. Transferred to [facility] for possible ablation. EKG here shows typical atria flutter. Patient has a history of presented to [facility] in the past with WCT that was AFL. Plan for EPS and ablation on Monday. Patient will need a DOMINGUEZ prior.

## 2021-07-16 NOTE — H&P ADULT - HISTORY OF PRESENT ILLNESS
65M with a PMH of Afib on ASA, recently started on Xarelto, asthma, hx of monomorphic VTac s/p ablation in 2019 who presents to the [facility] ED for palpitations.  Patient states that he started feeling short of breath day before er visit so he took a nebulizer for what he assumed was his asthma.  Patient reports developing severe palpitations, called EMS who found him diaphoretic with unobtainable BP and HR of 220.  Patient was cardioverted at 100J in the field to sinus rhythm and brought to [facility] on 7/12.  Was discharged from ED with cardio follow up (Dr Ha.)  Patient was started on xarelto by cardio  in office.  Developed dyspnea at home again, had a nebulizer and developed palpitations again and called EMS.  pt was started on amio at [facility] transferred to [facility] for poss EP eval / Ablation

## 2021-07-16 NOTE — CHART NOTE - NSCHARTNOTEFT_GEN_A_CORE
65M with a PMH of Afib on ASA, recently started on Xarelto, asthma, hx of monomorphic VTac s/p ablation in 2019 who presents to the ED for palpitations.  Patient states that yesterday he started feeling short of breath so he took a nebulizer for what he assumed was his asthma.  Patient reports developing severe palpitations, called EMS who found him diaphoretic with unobtainable BP and HR of 220.  Patient was cardioverted at 100J in the field to sinus rhythm and brought to [facility] on 7/12.  Was discharged from ED with cardio follow up (Dr Ha.)  Patient was started on xarelto by cardio.,  Developed dyspnea at home,had a nebulizer and developed palpitations again and called EMS. found to be rapid afib at [facility] transferred to [facility] for EP study / ablation

## 2021-07-16 NOTE — CONSULT NOTE ADULT - ASSESSMENT
ASSESSMENT AND PLAN  65M with pmhx Afib on Xarelto, h/o ablation for afib (at [facility], in 2019), present to [facility] c/o palpitation X1 day (7/13), now transferred to [facility] for possible ablation. Cardiology consult called for concern for ST changes in EKG.     Of note, pt was admitted on 2019 with a WCT in the setting of chest tightness and palpitations and cardiomyopathy with LVEF 35-40%; treated with amiodarone 150mg IVP and placed on gtt. Transferred to [facility] where he underwent cath to r/o CAD- normal cors, and subsuquently underwent ablation for what turned out to be atrial flutter with aberrancy.    Admit to 709B, /95, HR 78, RR16, O2sat 100% on room air. Received amio 200mg, [id number], mag 2gm iv. EKG shows Aflutter with aberrancy, . Cardiology consult called with concern for ST changes in EKG.       PLAN  #R/O ACS- previous Cath with normal coronaries and Echo with EF 35-40% in 2019. Initial EKG shows Atrial flutter with aberrancy,  and repeat EKG shows NSR with HR 73 with no acute ischemic changes. patient with chest pain free. troponin 12, probnp 1783 trending down from 3282.   -CHADSVASC 1.   - c/w xarelto, amio, metop  -repeat eKG prn for cp  -keep K>4 and <5, mg>2  -echo in am  -Consider EP consult in am.  -house cardiology will follow, call # [phone] for questions.    Case discussed with    Attending attestation to follow.     	  ASSESSMENT AND PLAN  65M with pmhx Afib on Xarelto, h/o ablation for afib (at [facility], in 2019), present to [facility] c/o palpitation X1 day (7/13), now transferred to [facility] for possible ablation. Cardiology consult called for concern for ST changes in EKG.     Of note, pt was admitted on 2019 with a WCT in the setting of chest tightness and palpitations and cardiomyopathy with LVEF 35-40%; treated with amiodarone 150mg IVP and placed on gtt. Transferred to [facility] where he underwent cath to r/o CAD- normal cors, and subsuquently underwent ablation for what turned out to be atrial flutter with aberrancy.    Admit to 709B, /95, HR 78, RR16, O2sat 100% on room air. Received amio 200mg, [id number], mag 2gm iv. EKG shows Aflutter with aberrancy, . Cardiology consult called with concern for ST changes in EKG.       PLAN  #R/O ACS- previous Cath with normal coronaries and Echo with EF 35-40% in 2019. Initial EKG shows Atrial flutter with aberrancy,  and repeat EKG shows NSR with HR 73 with no acute ischemic changes. patient with chest pain free. troponin 12, probnp 1783 trending down from 3282.   -CHADSVASC 1.   - c/w xarelto, amio, metop  -repeat eKG prn for cp  -keep K>4 and <5, mg>2  -echo in am  -Consider EP consult in am.  -house cardiology will follow, call # [phone] for questions.      Case discussed with Dr. Ronna Winters, cardiology fellow  Attending attestation to follow.

## 2021-07-16 NOTE — CONSULT NOTE ADULT - SUBJECTIVE AND OBJECTIVE BOX
CC:  Patient is a 65y old  Male who presents with a chief complaint of palpitation    HPI:  65M with pmhx Afib on Xarelto, h/o ablation for afib (at [facility], in 2019), present to [facility] c/o palpitation X1 day (7/13). He states starting Monday he felt palpitations which associated with diaphoresis and transient SOB and nausea. He initially BIBEMS to [facility] and was cardioverted with 100J by EMS on Monday(7/12). Then he was started on Xarelto & discharged with plan for Cardiology follow up with Dr. Ha. On Tuesday(7/13), He felt dyspnea and took nebulizer without relief and developed palpitations again. He denies CP, lightheadedness, dizziness, vomiting, dyuria, diarrhea, fever or chills. On 7/13, he was admitted to [facility] and now transferred to [facility] for possible ablation.     Of note, pt was admitted on 2019 with a WCT in the setting of chest tightness and palpitations and cardiomyopathy with LVEF 35-40%; treated with amiodarone 150mg IVP and placed on gtt. Transferred to [facility] where he underwent cath to r/o CAD- normal cors, and subsuquently underwent ablation for what turned out to be atrial flutter with aberrancy.    Admit to 709B, /95, HR 78, RR16, O2sat 100% on room air. Received amio 200mg, [id number], mag 2gm iv. EKG shows Aflutter with aberrancy, . Cardiology consult called with concern for ST changes in EKG. Repeat EKG shows NSR HR 73 with no acute ischemic changes.       Allergies    No Known Allergies    Intolerances    	    HOME MEDICATIONS    aMIOdarone    Tablet 200 milliGRAM(s) Oral two times a day  metoprolol tartrate 25 milliGRAM(s) Oral two times a day  rivaroxaban 20 milliGRAM(s) Oral with dinner  ASA 81mg po daily        PAST MEDICAL & SURGICAL HISTORY:  Asthma    Atrial fibrillation    Asthma    Atrial fibrillation    No significant past surgical history    No significant past surgical history        FAMILY HISTORY:  FH: HTN (hypertension)        SOCIAL HISTORY    Marital Status:   Occupation: counseler  Lives with: alone    SUBSTANCE USE  Tobacco Usage:  (X) None ( ) never smoked   ( ) former smoker  ( ) current smoker; Packs per day:   Alcohol Usage: ( ) none  (X) occasional ( ) 2-3 times a week ( ) daily; Last drink:   Recreational drugs (X) None    CONSTITUTIONAL: No fevers, No chills, No fatigue, No weight gain  EYES: No vision changes   ENT: No congestion, No ear pain, No sore throat.  NECK: No pain, No stiffness  RESPIRATORY: +shortness of breath, No cough, No wheezing, No hemoptysis  CARDIOVASCULAR: No chest pain. +palpitations, +diaphoresis, No CAMACHO, No orthopnea, No paroxysmal nocturnal dyspnea, No pleuritic pain  GASTROINTESTINAL: No abdominal pain, +nausea, No vomiting, No hematemesis, No diarrhea No constipation. No melena  GENITOURINARY: No dysuria, No frequency, No incontinence, No hematuria  NEUROLOGICAL: No dizziness, No lightheadedness, No syncope, No LOC, No headache, No numbness or weakness  MUSCULOSKELETAL: No Edema, No joint pain, No joint swelling.  PSYCHIATRIC: No anxiety, No depression  DERMATOLOGY: No diaphoresis. No itching, No rashes, No pressure ulcers  HEME/LYMPH: No easy bruising, or bleeding gums    All other review of systems is negative unless indicated above.    VITAL SIGNS  T(C): 36.4 (07-15-21 @ 22:23), Max: 36.7 (07-15-21 @ 21:36)  HR: 125 (07-16-21 @ 03:07) (75 - 128)  BP: 119/92 (07-16-21 @ 03:07) (107/89 - 134/102)  RR: 18 (07-16-21 @ 03:07) (15 - 19)  SpO2: 100% (07-16-21 @ 03:07) (96% - 100%)  Wt(kg): --    Appearance: NAD, no distress  HEENT: Moist Mucous Membranes,  Cardiovascular: Regular rate and rhythm, Normal S1 S2, No JVD, No murmurs  Respiratory: Lungs clear to auscultation. No rales, No rhonchi, No wheezing. No tenderness to palpation  Gastrointestinal:  Soft, Non-tender, + BS  Neurologic: Non-focal, A&Ox3  Skin: Warm and dry, No rashes, No ecchymosis, No cyanosis  Musculoskeletal: No clubbing, No cyanosis, No edema  Vascular: Peripheral pulses palpable 2+ bilaterally  Psychiatry: Mood & affect appropriate        LABORATORY VALUES	 	                          17.2   5.97  )-----------( 121      ( 16 Jul 2021 03:41 )             51.8       07-16    138  |  96<L>  |  14  ----------------------------<  72  3.6   |  26  |  1.21  07-15    138  |  102  |  13  ----------------------------<  60<L>  3.9   |  29  |  1.10    Ca    9.5      16 Jul 2021 03:41  Ca    8.9      15 Jul 2021 09:50  Phos  3.9     07-16  Mg     1.30     07-16    TPro  6.6  /  Alb  3.8  /  TBili  1.7<H>  /  DBili  x   /  AST  54<H>  /  ALT  89<H>  /  AlkPhos  91  07-16  TPro  6.7  /  Alb  3.3  /  TBili  2.3<H>  /  DBili  .40<H>  /  AST  81<H>  /  ALT  129<H>  /  AlkPhos  94  07-15    LIVER FUNCTIONS - ( 16 Jul 2021 03:41 )  Alb: 3.8 g/dL / Pro: 6.6 g/dL / ALK PHOS: 91 U/L / ALT: 89 U/L / AST: 54 U/L / GGT: x               CARDIAC MARKERS:  Creatine Kinase, Serum: 115 U/L (07-16 @ 03:41)  troponin 12      Troponin I, Serum: .065 ng/mL (07-14 @ 02:21)  Troponin I, Serum: .076 ng/mL (07-13 @ 23:54)  Troponin I, Serum: .078 ng/mL (07-13 @ 19:38)    Serum Pro-Brain Natriuretic Peptide: 1783 pg/mL (07-15 @ 09:50)  Serum Pro-Brain Natriuretic Peptide: 3282 pg/mL (07-13 @ 19:38)      07-15 @ 20:15  Cholesterol, Serum - 125  Direct LDL- --  HDL Cholesterol, Serum- 48  Triglycerides, Serum- 68      Thyroid Stimulating Hormone, Serum: 5.470 uU/mL (07-14 @ 02:21)      EKG : Aflutter with aberrancy,     repeat ECG:  NSR HR73	  	    PREVIOUS DIAGNOSTIC TESTING:    [ ] Echocardiogram:  < from: TTE with Doppler (w/Cont) (02.20.19 @ 19:38) >  Fractional short: 30 %  EF (Visual Estimate): 35-40 %  Doppler Peak Velocity (m/sec): AoV=0.8 TV=2.5  ------------------------------------------------------------------------  Conclusions:  1. Mitral annular calcification, otherwise normal mitral  valve. Mild-moderate mitral regurgitation.  2. Normal left ventricular internal dimensions and wall  thicknesses.  3. Moderate global left ventricular systolic  dysfunction.Endocardial visualization enhanced with  intravenous injection of Ultrasonic Enhancing Agent  (Definity).  4. Moderate right atrial enlargement.  5. Right ventricular enlargement with decreased right  ventricular systolic function.  6. Estimated right ventricular systolic pressure equals 33  mm Hg, assuming right atrial pressure equals 8 mm Hg,  consistent with normal pulmonary pressures.  7. Normal tricuspid valve. Moderate-severe tricuspid  regurgitation.  *** No previous Echo exam.  ------------------------------------------------------------------------  Confirmed on  2/20/2019 - 21:12:13 by Jimmy Al M.D.  ----------------------------------------    < end of copied text >    [ ] Catheterization:  < from: Cardiac Cath Lab - Adult (02.21.19 @ 15:30) >  CORONARY VESSELS: The coronary circulation is right dominant.  LM:   --  LM: Normal.  LAD:   --  LAD: Normal.  CX:   --  Circumflex: Normal.  RCA:   --  RCA: Normal.  COMPLICATIONS: There were no complications.  DIAGNOSTIC RECOMMENDATIONS: Medical management is recommended.  Prepared and signed by  Kota Mandujano M.D.    < end of copied text >

## 2021-07-16 NOTE — CHART NOTE - NSCHARTNOTEFT_GEN_A_CORE
Alerted by nursing staff that pt is in afib with rates in 120s sustained with normotension. EKG obtained which shows aflutter with rate 127 and machine read of aflutter with variable AV block with ST elevations, acute MI. Cardiology consult placed, awaiting recommendations. Alerted by nursing staff that pt is in afib with rates in 120s sustained with normotension. EKG obtained which shows aflutter with rate 127 and machine read of aflutter with variable AV block with ST elevations, acute MI. Pt denies any chest pain, although endorses tachycardia. He is well appearing and not in acute distress. Cardiology consult placed, awaiting recommendations.

## 2021-07-16 NOTE — CHART NOTE - NSCHARTNOTEFT_GEN_A_CORE
Nursing staff with repeated concerns that no H&P is present. After conferring with [facility], attempted to contact Dr. Whitt accepting physician regarding lack of H&P. No answer via telephone. Pt signed out to ACP based on prior records.

## 2021-07-16 NOTE — CONSULT NOTE ADULT - ASSESSMENT
65M with pmhx asthma and atrial fibrillation s/p ablation at [facility], in 2019, off anticoagulation until 3 days ago (now on Xarelto) who presented to [facility] c/o palpitation X1 month (7/13).  Palpitations were associated with diaphoresis, transient SOB and nausea. He initially BIBEMS to [facility] ED 7/12/21and was cardioverted with 100J by EMS for hemodynamic instability. He was started on Xarelto & discharged with plan for cardiology follow up with Dr. Ha. On Tuesday(7/13), he felt dyspneic and took nebulizer, thinking it was his asthma, without relief.  On 7/13, he was admitted to [facility] and now transferred to [facility] for possible ablation.   Of note, pt was admitted on 2019 with a WCT in the setting of chest tightness and palpitations and cardiomyopathy with LVEF 35-40%; treated with amiodarone 150mg IVP and infusion. . Transferred to [facility] where he underwent cardiac cath which demonstrated normal coronaries.  He subsuquently underwent ablation for what turned out to be atrial flutter with aberrancy.    Admit to 709B, /95, HR 78, RR16, O2sat 100% on room air. Received amiodarone 200mg, metoprolol 25mg, magnesium 2gm IV.   EKG showed Aflutter (typical) with aberrancy, . Cardiology consult called with concern for ST changes in EKG. Repeat EKG shows NSR HR 73 with no acute ischemic changes.   EP consulted for AFib/flutter ablation.      65M with pmhx asthma and atrial fibrillation s/p ablation at [facility], in 2019, off anticoagulation until 3 days ago (now on Xarelto) who presented to [facility] c/o palpitation X1 month (7/13).  Palpitations were associated with diaphoresis, transient SOB and nausea. He initially BIBEMS to [facility] ED 7/12/21 and was successfully cardioverted to sinus rhythm with 100J by EMS for hemodynamic instability. He was started on Xarelto & discharged with plan for cardiology follow up with Dr. Ha. On Tuesday(7/13), he felt dyspneic and took his nebulizer, thinking it was his asthma. But when there was no  relief. he presented [facility] where he was admitted with AFib/Flutter w/RVR. Transferred to [facility] for possible ablation.   Of note, pt was admitted on 2019 with a WCT in the setting of chest tightness and palpitations and cardiomyopathy with LVEF 35-40%; treated with amiodarone 150mg IVP and infusion. . Transferred to [facility] where he underwent cardiac cath which demonstrated normal coronaries.  He subsuquently underwent ablation for what turned out to be atrial flutter with aberrancy.  EKG today showed typical atrial flutter with variable block (127)and PVC's. N EKG for review on 7/12/21 admission.  Now in sinus rhythm.  We have discussed AFib/flutter ablation with the patient in detail. He is aware of the risks, benefits and alternatives to the procedure and has consented. He will require a DOMINGUEZ prior to the ablation given only recently starting Xarelto.  Plan:  DOMINGUEZ and AFib/flutter ablation Monday.           NPO after midnight Sunday.           T&S 2 units PRBC's           KCentra ordered           Continue current medications including metoprolol, Amiodarone and Xarelto. Hold medications on Monday.             Monday labs at 6:00am.            Keep K+ > 4.0 and Mg > 2.0

## 2021-07-17 LAB
ANION GAP SERPL CALC-SCNC: 15 MMOL/L — HIGH (ref 7–14)
BLD GP AB SCN SERPL QL: NEGATIVE — SIGNIFICANT CHANGE UP
BUN SERPL-MCNC: 13 MG/DL — SIGNIFICANT CHANGE UP (ref 7–23)
CALCIUM SERPL-MCNC: 9.3 MG/DL — SIGNIFICANT CHANGE UP (ref 8.4–10.5)
CHLORIDE SERPL-SCNC: 98 MMOL/L — SIGNIFICANT CHANGE UP (ref 98–107)
CO2 SERPL-SCNC: 25 MMOL/L — SIGNIFICANT CHANGE UP (ref 22–31)
CREAT SERPL-MCNC: 1.09 MG/DL — SIGNIFICANT CHANGE UP (ref 0.5–1.3)
GLUCOSE SERPL-MCNC: 66 MG/DL — LOW (ref 70–99)
HCT VFR BLD CALC: 50.9 % — HIGH (ref 39–50)
HGB BLD-MCNC: 16.5 G/DL — SIGNIFICANT CHANGE UP (ref 13–17)
MAGNESIUM SERPL-MCNC: 1.7 MG/DL — SIGNIFICANT CHANGE UP (ref 1.6–2.6)
MCHC RBC-ENTMCNC: 28.3 PG — SIGNIFICANT CHANGE UP (ref 27–34)
MCHC RBC-ENTMCNC: 32.4 GM/DL — SIGNIFICANT CHANGE UP (ref 32–36)
MCV RBC AUTO: 87.3 FL — SIGNIFICANT CHANGE UP (ref 80–100)
NRBC # BLD: 0 /100 WBCS — SIGNIFICANT CHANGE UP
NRBC # FLD: 0 K/UL — SIGNIFICANT CHANGE UP
PHOSPHATE SERPL-MCNC: 3 MG/DL — SIGNIFICANT CHANGE UP (ref 2.5–4.5)
PLATELET # BLD AUTO: 135 K/UL — LOW (ref 150–400)
POTASSIUM SERPL-MCNC: 3.9 MMOL/L — SIGNIFICANT CHANGE UP (ref 3.5–5.3)
POTASSIUM SERPL-SCNC: 3.9 MMOL/L — SIGNIFICANT CHANGE UP (ref 3.5–5.3)
RBC # BLD: 5.83 M/UL — HIGH (ref 4.2–5.8)
RBC # FLD: 15 % — HIGH (ref 10.3–14.5)
RH IG SCN BLD-IMP: POSITIVE — SIGNIFICANT CHANGE UP
SODIUM SERPL-SCNC: 138 MMOL/L — SIGNIFICANT CHANGE UP (ref 135–145)
WBC # BLD: 5.55 K/UL — SIGNIFICANT CHANGE UP (ref 3.8–10.5)
WBC # FLD AUTO: 5.55 K/UL — SIGNIFICANT CHANGE UP (ref 3.8–10.5)

## 2021-07-17 RX ADMIN — AMIODARONE HYDROCHLORIDE 200 MILLIGRAM(S): 400 TABLET ORAL at 05:38

## 2021-07-17 RX ADMIN — Medication 25 MILLIGRAM(S): at 17:01

## 2021-07-17 RX ADMIN — AMIODARONE HYDROCHLORIDE 200 MILLIGRAM(S): 400 TABLET ORAL at 17:01

## 2021-07-17 RX ADMIN — RIVAROXABAN 20 MILLIGRAM(S): KIT at 17:00

## 2021-07-17 RX ADMIN — Medication 25 MILLIGRAM(S): at 05:39

## 2021-07-17 NOTE — PROGRESS NOTE ADULT - ASSESSMENT
Patient is a 65M with a PMH of Afib on ASA, recently started on Xarelto, asthma, hx of monomorphic VTac s/p ablation in 2019 who presents to the ED for palpitations.  Patient states that yesterday he started feeling short of breath so he took a nebulizer for what he assumed was his asthma.  Patient reports developing severe palpitations, called EMS who found him diaphoretic with unobtainable BP and HR of 220.  Patient was cardioverted at 100J in the field to sinus rhythm and brought to [facility] on 7/12.  Was discharged from ED with cardio follow up (Dr Ha.)  Patient was started on xarelto by cardio today in office.  Developed dyspnea at home again, had a nebulizer and developed palpitations again and called EMS.  Currently has no active complaints.  Tachy to 130 in ED, labs show mildly elevated troponin level.          IMPROVE VTE Individual Risk Assessment          RISK                                                          Points  [  ] Previous VTE                                                3  [  ] Thrombophilia                                             2  [  ] Lower limb paralysis                                    2        (unable to hold up >15 seconds)    [  ] Current Cancer                                             2         (within 6 months)  [  ] Immobilization > 24 hrs                              1  [  ] ICU/CCU stay > 24 hours                            1  [  ] Age > 60                                                    1    IMPROVE VTE Score - 1

## 2021-07-18 LAB
ANION GAP SERPL CALC-SCNC: 17 MMOL/L — HIGH (ref 7–14)
BUN SERPL-MCNC: 11 MG/DL — SIGNIFICANT CHANGE UP (ref 7–23)
CALCIUM SERPL-MCNC: 9.5 MG/DL — SIGNIFICANT CHANGE UP (ref 8.4–10.5)
CHLORIDE SERPL-SCNC: 97 MMOL/L — LOW (ref 98–107)
CO2 SERPL-SCNC: 24 MMOL/L — SIGNIFICANT CHANGE UP (ref 22–31)
CREAT SERPL-MCNC: 1.07 MG/DL — SIGNIFICANT CHANGE UP (ref 0.5–1.3)
GLUCOSE SERPL-MCNC: 62 MG/DL — LOW (ref 70–99)
HCT VFR BLD CALC: 55.2 % — HIGH (ref 39–50)
HGB BLD-MCNC: 18 G/DL — HIGH (ref 13–17)
MAGNESIUM SERPL-MCNC: 1.8 MG/DL — SIGNIFICANT CHANGE UP (ref 1.6–2.6)
MCHC RBC-ENTMCNC: 28.6 PG — SIGNIFICANT CHANGE UP (ref 27–34)
MCHC RBC-ENTMCNC: 32.6 GM/DL — SIGNIFICANT CHANGE UP (ref 32–36)
MCV RBC AUTO: 87.6 FL — SIGNIFICANT CHANGE UP (ref 80–100)
NRBC # BLD: 0 /100 WBCS — SIGNIFICANT CHANGE UP
NRBC # FLD: 0 K/UL — SIGNIFICANT CHANGE UP
PHOSPHATE SERPL-MCNC: 3 MG/DL — SIGNIFICANT CHANGE UP (ref 2.5–4.5)
PLATELET # BLD AUTO: 155 K/UL — SIGNIFICANT CHANGE UP (ref 150–400)
POTASSIUM SERPL-MCNC: 4.3 MMOL/L — SIGNIFICANT CHANGE UP (ref 3.5–5.3)
POTASSIUM SERPL-SCNC: 4.3 MMOL/L — SIGNIFICANT CHANGE UP (ref 3.5–5.3)
RBC # BLD: 6.3 M/UL — HIGH (ref 4.2–5.8)
RBC # FLD: 15.2 % — HIGH (ref 10.3–14.5)
SODIUM SERPL-SCNC: 138 MMOL/L — SIGNIFICANT CHANGE UP (ref 135–145)
WBC # BLD: 5.37 K/UL — SIGNIFICANT CHANGE UP (ref 3.8–10.5)
WBC # FLD AUTO: 5.37 K/UL — SIGNIFICANT CHANGE UP (ref 3.8–10.5)

## 2021-07-18 PROCEDURE — 93010 ELECTROCARDIOGRAM REPORT: CPT | Mod: 76

## 2021-07-18 RX ADMIN — AMIODARONE HYDROCHLORIDE 200 MILLIGRAM(S): 400 TABLET ORAL at 17:26

## 2021-07-18 RX ADMIN — Medication 25 MILLIGRAM(S): at 05:04

## 2021-07-18 RX ADMIN — AMIODARONE HYDROCHLORIDE 200 MILLIGRAM(S): 400 TABLET ORAL at 05:05

## 2021-07-18 RX ADMIN — Medication 25 MILLIGRAM(S): at 17:26

## 2021-07-18 RX ADMIN — RIVAROXABAN 20 MILLIGRAM(S): KIT at 17:26

## 2021-07-18 NOTE — CHART NOTE - NSCHARTNOTEFT_GEN_A_CORE
[facility] MEDICINE NIGHT COVERAGE    Notified by RN - patient's HR elevated to 140s, however non-sustaining. Patient asymptomatic; no chest pain, palpitations or shortness of breath. EKG performed and reviewed - sinus arrhythmia w/ PVCs (no acute changes). Pending DOMINGUEZ/DCCV 7/19.    Tasneem Boyd PA-C  Medicine [id number] [facility] MEDICINE NIGHT COVERAGE    Notified by RN - patient's HR elevated to 140s, however non-sustaining. Patient asymptomatic; no chest pain, palpitations or shortness of breath or any other complaints. EKG performed and reviewed - sinus arrhythmia w/ PVCs (no acute changes). HR wnl (77bpm). Pending DOMINGUEZ w/ ablation 7/19. Will continue to monitor for any acute changes.    Tasneem Boyd PA-C  Medicine [id number]

## 2021-07-18 NOTE — PROGRESS NOTE ADULT - TIME BILLING
- Review of records, telemetry, vital signs and daily labs.   - General and cardiovascular physical examination.  - Generation of cardiovascular treatment plan.  - Coordination of care.      Patient was seen and examined by me on 07/18/2021,interim events noted,labs and radiology studies reviewed.  Cuco Limon MD,FACC.  [address] [place], [place] [id number].  [phone]

## 2021-07-18 NOTE — PROGRESS NOTE ADULT - ASSESSMENT
Patient is a 65M with a PMH of Afib on ASA, recently started on Xarelto, asthma, hx of monomorphic VTac s/p ablation in 2019 who presents to the ED for palpitations.  Patient states that yesterday he started feeling short of breath so he took a nebulizer for what he assumed was his asthma.  Patient reports developing severe palpitations, called EMS who found him diaphoretic with unobtainable BP and HR of 220.  Patient was cardioverted at 100J in the field to sinus rhythm and brought to [facility] on 7/12.  Was discharged from ED with cardio follow up (Dr Ha.)  Patient was started on xarelto by cardio today in office.  Developed dyspnea at home again, had a nebulizer and developed palpitations again and called EMS.  Currently has no active complaints.  Tachy to 130 in ED, labs show mildly elevated troponin level.          IMPROVE VTE Individual Risk Assessment          RISK                                                          Points  [  ] Previous VTE                                                3  [  ] Thrombophilia                                             2  [  ] Lower limb paralysis                                    2        (unable to hold up >15 seconds)    [  ] Current Cancer                                             2         (within 6 months)  [  ] Immobilization > 24 hrs                              1  [  ] ICU/CCU stay > 24 hours                            1  [  ] Age > 60                                                    1    IMPROVE VTE Score - 1

## 2021-07-18 NOTE — PROVIDER CONTACT NOTE (OTHER) - BACKGROUND
H/O afib on Xareleto, monomorphic VT s/p ablation 2019 admitted with palpitations to [facility] and tx to [facility] for ablation.

## 2021-07-19 LAB
ANION GAP SERPL CALC-SCNC: 14 MMOL/L — SIGNIFICANT CHANGE UP (ref 7–14)
BUN SERPL-MCNC: 10 MG/DL — SIGNIFICANT CHANGE UP (ref 7–23)
CALCIUM SERPL-MCNC: 9.3 MG/DL — SIGNIFICANT CHANGE UP (ref 8.4–10.5)
CHLORIDE SERPL-SCNC: 101 MMOL/L — SIGNIFICANT CHANGE UP (ref 98–107)
CO2 SERPL-SCNC: 24 MMOL/L — SIGNIFICANT CHANGE UP (ref 22–31)
CREAT SERPL-MCNC: 1.02 MG/DL — SIGNIFICANT CHANGE UP (ref 0.5–1.3)
GLUCOSE BLDC GLUCOMTR-MCNC: 66 MG/DL — LOW (ref 70–99)
GLUCOSE SERPL-MCNC: 62 MG/DL — LOW (ref 70–99)
HCT VFR BLD CALC: 49.9 % — SIGNIFICANT CHANGE UP (ref 39–50)
HGB BLD-MCNC: 16.1 G/DL — SIGNIFICANT CHANGE UP (ref 13–17)
MAGNESIUM SERPL-MCNC: 1.6 MG/DL — SIGNIFICANT CHANGE UP (ref 1.6–2.6)
MCHC RBC-ENTMCNC: 27.8 PG — SIGNIFICANT CHANGE UP (ref 27–34)
MCHC RBC-ENTMCNC: 32.3 GM/DL — SIGNIFICANT CHANGE UP (ref 32–36)
MCV RBC AUTO: 86.2 FL — SIGNIFICANT CHANGE UP (ref 80–100)
NRBC # BLD: 0 /100 WBCS — SIGNIFICANT CHANGE UP
NRBC # FLD: 0 K/UL — SIGNIFICANT CHANGE UP
PHOSPHATE SERPL-MCNC: 2.7 MG/DL — SIGNIFICANT CHANGE UP (ref 2.5–4.5)
PLATELET # BLD AUTO: 145 K/UL — LOW (ref 150–400)
POTASSIUM SERPL-MCNC: 4.4 MMOL/L — SIGNIFICANT CHANGE UP (ref 3.5–5.3)
POTASSIUM SERPL-SCNC: 4.4 MMOL/L — SIGNIFICANT CHANGE UP (ref 3.5–5.3)
RBC # BLD: 5.79 M/UL — SIGNIFICANT CHANGE UP (ref 4.2–5.8)
RBC # FLD: 14.8 % — HIGH (ref 10.3–14.5)
SODIUM SERPL-SCNC: 139 MMOL/L — SIGNIFICANT CHANGE UP (ref 135–145)
WBC # BLD: 4.58 K/UL — SIGNIFICANT CHANGE UP (ref 3.8–10.5)
WBC # FLD AUTO: 4.58 K/UL — SIGNIFICANT CHANGE UP (ref 3.8–10.5)

## 2021-07-19 PROCEDURE — 93662 INTRACARDIAC ECG (ICE): CPT | Mod: 26

## 2021-07-19 PROCEDURE — 93010 ELECTROCARDIOGRAM REPORT: CPT

## 2021-07-19 PROCEDURE — 93655 ICAR CATH ABLTJ DSCRT ARRHYT: CPT

## 2021-07-19 PROCEDURE — 93656 COMPRE EP EVAL ABLTJ ATR FIB: CPT

## 2021-07-19 PROCEDURE — 93320 DOPPLER ECHO COMPLETE: CPT | Mod: 26,GC

## 2021-07-19 PROCEDURE — 93325 DOPPLER ECHO COLOR FLOW MAPG: CPT | Mod: 26,GC

## 2021-07-19 PROCEDURE — 93312 ECHO TRANSESOPHAGEAL: CPT | Mod: 26

## 2021-07-19 PROCEDURE — 93613 INTRACARDIAC EPHYS 3D MAPG: CPT

## 2021-07-19 RX ORDER — PANTOPRAZOLE SODIUM 20 MG/1
40 TABLET, DELAYED RELEASE ORAL
Refills: 0 | Status: DISCONTINUED | OUTPATIENT
Start: 2021-07-19 | End: 2021-07-21

## 2021-07-19 RX ORDER — MAGNESIUM SULFATE 500 MG/ML
2 VIAL (ML) INJECTION ONCE
Refills: 0 | Status: COMPLETED | OUTPATIENT
Start: 2021-07-19 | End: 2021-07-19

## 2021-07-19 RX ADMIN — Medication 25 MILLIGRAM(S): at 18:51

## 2021-07-19 RX ADMIN — RIVAROXABAN 20 MILLIGRAM(S): KIT at 18:51

## 2021-07-19 RX ADMIN — AMIODARONE HYDROCHLORIDE 200 MILLIGRAM(S): 400 TABLET ORAL at 18:51

## 2021-07-19 RX ADMIN — Medication 50 GRAM(S): at 16:05

## 2021-07-19 RX ADMIN — AMIODARONE HYDROCHLORIDE 200 MILLIGRAM(S): 400 TABLET ORAL at 05:19

## 2021-07-19 RX ADMIN — Medication 25 MILLIGRAM(S): at 01:20

## 2021-07-19 NOTE — PROGRESS NOTE ADULT - ASSESSMENT
Patient is a 65M with a PMH of Afib on ASA, recently started on Xarelto, asthma, hx of monomorphic VTac s/p ablation in 2019 who presents to the ED for palpitations.  Patient states that yesterday he started feeling short of breath so he took a nebulizer for what he assumed was his asthma.  Patient reports developing severe palpitations, called EMS who found him diaphoretic with unobtainable BP and HR of 220.  Patient was cardioverted at 100J in the field to sinus rhythm and brought to [facility] on 7/12.  Was discharged from ED with cardio follow up (Dr Ha.)  Patient was started on xarelto by cardio today in office.  Developed dyspnea at home again, had a nebulizer and developed palpitations again and called EMS.  Currently has no active complaints.  Tachy to 130 in ED, labs show mildly elevated troponin level.          IMPROVE VTE Individual Risk Assessment          RISK                                                          Points  [  ] Previous VTE                                                3  [  ] Thrombophilia                                             2  [  ] Lower limb paralysis                                    2        (unable to hold up >15 seconds)    [  ] Current Cancer                                             2         (within 6 months)  [  ] Immobilization > 24 hrs                              1  [  ] ICU/CCU stay > 24 hours                            1  [  ] Age > 60                                                    1    IMPROVE VTE Score - 1

## 2021-07-19 NOTE — CHART NOTE - NSCHARTNOTEFT_GEN_A_CORE
Patient is s/p Afib Ablation. Right groin site soft, without swelling, without erythema, with dressing with 2 small spots of blood, otherwise remains clean/dry/intact. Site is stable. No e/o hematoma. Right foot with good pedal pulse, and brisk capillary refill to right foot and toes. Patient denies any chest pain, palpitations, shortness of breath, dizziness, or any other c/o's or new concerns. Will continue to monitor closely.

## 2021-07-19 NOTE — CHART NOTE - NSCHARTNOTEFT_GEN_A_CORE
[facility]  [id number]    PROCEDURE:  AF ablation  AFL ablation          INDICATION:  AF/AFL        ELECTROPHYSIOLOGIST(S):  MD Royce        FINDINGS:  The patient was consented and brought to the EP lab. The anesthesia department intubated the patient and provided general sedation. A DOMINGUEZ was performed by the cardiology department and showed no evidence of GREY thrombus. Bilateral groins were draped and prepped in a sterile fashion. An esophageal temperature probe was placed to monitor esophageal temperatures during ablation. Ultrasound guided right femoral venous access was obtained with an 8Fr, 9Fr, and 7Fr sheaths. An ICE catheter was advanced into the heart and demonstrated no pericardial effusion. A decapolar catheter was placed in the CS position. Carto 3 mapping system was utilized. A CartoSound map of the LA was created. Using a PentaRay catheter, a 3D voltage map of the RA was created. The RA was healthy without evidence of any prior ablation. A heparin bolus was given and a drip started to maintain an ACT >350ms. Using ICE and Fluoro guidance, transeptal access was obtained using a VersaCross sheath and Antonio needle. A 3D voltage map of the LA was created. The LA had normal voltage. No evidence of previous ablation was noted. The left pulmonary veins had narrow ostia. A 1 to 1 AT with varying CL from 380-500 ms was seen and noted to be earliest in the left superior pulmonary vein. Using an ST-SF D-F curve catheter, circumferential ablation of the right superior and inferior pulmonary veins was performed. First pass isolation was achieved. Isolated potentials were noted in the right superior pulmonary vein. The phrenic nerve was mapped on the right prior to ablation. Similarly, the left superior and inferior pulmonary veins were isolated as a pair, and first pass isolation was achieved. Next, a CTI line was created, and bidirectional block was achieved. The LA was re-accessed, and a 3D voltage map of the LA was created. All four veins were durably isolated. Block across the CTI was re-confirmed. Heparin was stopped and protamine was given. ICE demonstrated no pericardial effusion. All catheters and sheaths were removed and VASCADE closure devices were utilized to achieve hemostasis. The patient was extubated and left the lab in a stable condition.      COMPLICATIONS:  None      RECOMMENDATIONS:  - Bed rest for 3 hours  - Resume AC tonight  - OMT for heart failure.

## 2021-07-20 ENCOUNTER — TRANSCRIPTION ENCOUNTER (OUTPATIENT)
Age: 66
End: 2021-07-20

## 2021-07-20 PROCEDURE — 93010 ELECTROCARDIOGRAM REPORT: CPT

## 2021-07-20 PROCEDURE — 93306 TTE W/DOPPLER COMPLETE: CPT | Mod: 26

## 2021-07-20 PROCEDURE — 99232 SBSQ HOSP IP/OBS MODERATE 35: CPT | Mod: 25

## 2021-07-20 RX ORDER — COLCHICINE 0.6 MG
0.6 TABLET ORAL EVERY 12 HOURS
Refills: 0 | Status: DISCONTINUED | OUTPATIENT
Start: 2021-07-20 | End: 2021-07-21

## 2021-07-20 RX ADMIN — AMIODARONE HYDROCHLORIDE 200 MILLIGRAM(S): 400 TABLET ORAL at 05:23

## 2021-07-20 RX ADMIN — Medication 25 MILLIGRAM(S): at 17:17

## 2021-07-20 RX ADMIN — Medication 25 MILLIGRAM(S): at 05:23

## 2021-07-20 RX ADMIN — PANTOPRAZOLE SODIUM 40 MILLIGRAM(S): 20 TABLET, DELAYED RELEASE ORAL at 05:23

## 2021-07-20 RX ADMIN — AMIODARONE HYDROCHLORIDE 200 MILLIGRAM(S): 400 TABLET ORAL at 17:17

## 2021-07-20 RX ADMIN — RIVAROXABAN 20 MILLIGRAM(S): KIT at 17:17

## 2021-07-20 RX ADMIN — Medication 0.6 MILLIGRAM(S): at 17:18

## 2021-07-20 NOTE — PROGRESS NOTE ADULT - SUBJECTIVE AND OBJECTIVE BOX
SUBJECTIVE / OVERNIGHT EVENTS: pt denies chest pain, shortness of breath     MEDICATIONS  (STANDING):  aMIOdarone    Tablet 200 milliGRAM(s) Oral two times a day  colchicine 0.6 milliGRAM(s) Oral every 12 hours  furosemide    Tablet 40 milliGRAM(s) Oral two times a day  metoprolol tartrate 25 milliGRAM(s) Oral two times a day  pantoprazole    Tablet 40 milliGRAM(s) Oral before breakfast  rivaroxaban 20 milliGRAM(s) Oral with dinner    MEDICATIONS  (PRN):    Vital Signs Last 24 Hrs  T(C): 37.1 (20 Jul 2021 21:58), Max: 37.1 (20 Jul 2021 21:58)  T(F): 98.7 (20 Jul 2021 21:58), Max: 98.7 (20 Jul 2021 21:58)  HR: 74 (20 Jul 2021 21:58) (66 - 77)  BP: 115/68 (20 Jul 2021 21:58) (104/67 - 120/78)  BP(mean): --  RR: 17 (20 Jul 2021 21:58) (16 - 18)  SpO2: 100% (20 Jul 2021 21:58) (100% - 100%)    Constitutional: No fever, fatigue  Skin: No rash.  Eyes: No recent vision problems or eye pain.  ENT: No congestion, ear pain, or sore throat.  Cardiovascular: No chest pain or palpation.  Respiratory: No cough, shortness of breath, congestion, or wheezing.  Gastrointestinal: No abdominal pain, nausea, vomiting, or diarrhea.  Genitourinary: No dysuria.  Musculoskeletal: No joint swelling.  Neurologic: No headache.    PHYSICAL EXAM:  GENERAL: NAD  EYES: EOMI, PERRLA  NECK: Supple, No JVD  CHEST/LUNG: dec breath sounds at bases  HEART:  S1 , S2 + reg  ABDOMEN: soft , bs+  EXTREMITIES: trace edema+   NEUROLOGY:alert awake    LABS:  07-19    139  |  101  |  10  ----------------------------<  62<L>  4.4   |  24  |  1.02    Ca    9.3      19 Jul 2021 07:36  Phos  2.7     07-19  Mg     1.60     07-19      Creatinine Trend: 1.02 <--, 1.07 <--, 1.09 <--, 1.21 <--, 1.10 <--                        16.1   4.58  )-----------( 145      ( 19 Jul 2021 07:36 )             49.9     Urine Studies:                                  Imaging Personally Reviewed:    Consultant(s) Notes Reviewed:      Care Discussed with Consultants/Other Providers:  
ANESTHESIA POSTOP CHECK    65y Male POSTOP DAY 1 S/P DOMINGUEZ/A fib ablation    Vital Signs Last 24 Hrs  T(C): 36.4 (20 Jul 2021 05:15), Max: 37 (19 Jul 2021 22:00)  T(F): 97.6 (20 Jul 2021 05:15), Max: 98.6 (19 Jul 2021 22:00)  HR: 66 (20 Jul 2021 08:13) (66 - 72)  BP: 118/80 (20 Jul 2021 08:13) (101/65 - 132/85)  BP(mean): --  RR: 16 (20 Jul 2021 08:13) (16 - 18)  SpO2: 100% (20 Jul 2021 08:13) (100% - 100%)  I&O's Summary      [x ] NO APPARENT ANESTHESIA COMPLICATIONS      Comments: 
DATE OF SERVICE:  07/18/2021  Patient was seen and examined on 07/18/2021    .Interim events noted.Consultant notes ,Labs,Telemetry reviewed by me    PRESENTING CC:Palpitations    HPI and HOSPITAL COURSE: 65M with a PMH of trial Fibrillation on ASA, recently started on Xarelto, asthma, hx of monomorphic VT s/p ablation in 2019 who presents to  [place] ED for palpitations.  Patient states that he started feeling short of breath day before er visit so he took a nebulizer for what he assumed was his asthma.    Patient reports developing severe palpitations, called EMS who found him diaphoretic with unobtainable BP and HR of 220.    Patient was cardioverted at 100J in the field to sinus rhythm and brought to [facility] on 7/12.    Was discharged from ED with cardio follow up (Dr Ha.)  Patient was started on Xarelto by cardio  in office.  Developed dyspnea at home again, had a nebulizer and developed palpitations again and called EMS.  Pt was started on amio at [facility] transferred to [facility] for poss EP eval / Ablation      INTERIM EVENTS:Awake alert remains in Atrial Fibrillation episodes RVR scheduled for ablation tomorrow      PMH -reviewed admission note, no change since admission  Heart Failure: Acute [ ] Chronic [ ] Acute on Chronic [ ] Diastolic [ ] Systolic [ ] Combined Systolic and Diastolic[ ]  MAURO[ ]  ATN[ ]  CKD I [ ] CKDII [ ] CKD III [ ] CKD IV [ ] CKD V [ ] ESRD[ ]  HTN[ ] CVA[ ] DM[ ] COPD[ ] COVID[ ] AF[ ]  PPM[ ] ICD[ ]    MEDICATIONS  (STANDING):  aMIOdarone    Tablet 200 milliGRAM(s) Oral two times a day  furosemide    Tablet 40 milliGRAM(s) Oral two times a day  metoprolol tartrate 25 milliGRAM(s) Oral two times a day  rivaroxaban 20 milliGRAM(s) Oral with dinner              REVIEW OF SYSTEMS:  Constitutional: [ ] fever, [ ]weight loss,  [c ]fatigue  Eyes: [ ] visual changes  Respiratory: [ ]shortness of breath;  [ ] cough, [ ]wheezing, [ ]chills, [ ]hemoptysis  Cardiovascular: [ ] chest pain, [x ]palpitations, [ ]dizziness,  [ ]leg swelling[ ]orthopnea[ ]PND  Gastrointestinal: [ ] abdominal pain, [ ]nausea, [ ]vomiting,  [ ]diarrhea [ ]Constipation [ ]Melena  Genitourinary: [ ] dysuria, [ ] hematuria [ ][place]  Neurologic: [ ] headaches [ ] tremors[ ]weakness [ ]Paralysis Right[ ] Left[ ]  Skin: [ ] itching, [ ]burning, [ ] rashes  Endocrine: [ ] heat or cold intolerance  Musculoskeletal: [ ] joint pain or swelling; [ ] muscle, back, or extremity pain  Psychiatric: [ ] depression, [ ]anxiety, [ ]mood swings, or [ ]difficulty sleeping  Hematologic: [ ] easy bruising, [ ] bleeding gums    [x] All remaining systems negative except as per above.   [ ]Unable to obtain.    Vital Signs Last 24 Hrs  T(C): 36.7 (18 Jul 2021 04:24), Max: 36.7 (18 Jul 2021 04:24)  T(F): 98 (18 Jul 2021 04:24), Max: 98 (18 Jul 2021 04:24)  HR: 77 (18 Jul 2021 04:24) (73 - 83)  BP: 144/81 (18 Jul 2021 04:24) (111/75 - 144/81)  RR: 18 (18 Jul 2021 04:24) (18 - 18)  SpO2: 99% (18 Jul 2021 04:24) (98% - 99%)  I&O's Summary      PHYSICAL EXAM:  General: No acute distress BMI-24  HEENT: EOMI, PERRL  Neck: Supple, [ ] JVD  Lungs: Equal air entry bilaterally; [ ] rales [ ] wheezing [ ] rhonchi  Heart: Irregular rate and rhythm; [x ] murmur  2 /6 [x ] systolic [ ] diastolic [ ] radiation[ ] rubs [ ]  gallops  Abdomen: Nontender, bowel sounds present  Extremities: No clubbing, cyanosis, [ ] edema [ ]Pulses  equal and intact  Nervous system:  Alert & Oriented X3, no focal deficits  Psychiatric: Normal affect  Skin: No rashes or lesions    LABS:  07-18    138  |  97<L>  |  11  ----------------------------<  62<L>  4.3   |  24  |  1.07    Ca    9.5      18 Jul 2021 06:53  Phos  3.0     07-18  Mg     1.80     07-18      Creatinine Trend: 1.07<--, 1.09<--, 1.21<--, 1.10<--, 1.28<--, 1.21<--                        18.0   5.37  )-----------( 155      ( 18 Jul 2021 06:53 )             55.2       RADIOLOGY:XR CHEST PORTABLE URGENT 1V     No change heart mediastinum. No change in the chronic appearing opacities right upper lobe with associated volume loss. No acute infiltrate pleural effusion or other new abnormality      ECG [my interpretation]:Atrial Flutter variable AV conduction     TELEMETRY:Reviewed monitor tracings-Atrial flutter/Atrial Fibrillation    ECHO:  Study Date: 2/20/2019  Conclusions:  1. Mitral annular calcification, otherwise normal mitral valve. Mild-moderate mitral regurgitation.  2. Normal left ventricular internal dimensions and wall thicknesses.  3. Moderate global left ventricular systolic dysfunction.Endocardial visualization enhanced with intravenous injection of Ultrasonic Enhancing Agent (Definity).  4. Moderate right atrial enlargement.  5. Right ventricular enlargement with decreased right ventricular systolic function.  6. Estimated right ventricular systolic pressure equals 33 mm Hg, assuming right atrial pressure equals 8 mm Hg, consistent with normal pulmonary pressures.  7. Normal tricuspid valve. Moderate-severe tricuspid regurgitation.    CATHETERIZATION:02/19/2019  CORONARY VESSELS: The coronary circulation is right dominant.  LM:   --  LM: Normal.  LAD:   --  LAD: Normal.  CX:   --  Circumflex: Normal.  RCA:   --  RCA: Normal.    IMPRESSION AND PLAN:  65M with a PMH of Afib on ASA, recently started on Xarelto, asthma, hx of monomorphic VTac s/p ablation in 2019 who presents to the ED for palpitations.  Patient states that yesterday he started feeling short of breath so he took a nebulizer for what he assumed was his asthma.  Patient reports developing severe palpitations, called EMS who found him diaphoretic with unobtainable BP and HR of 220.  Patient was cardioverted at 100J in the field to sinus rhythm and brought to [facility] on 7/12.  Was discharged from ED with cardio follow up (Dr Ha.)  Patient was started on xarelto now readmittted with persistern Atrial Fibrillation    Scheduled for Ablation tomorrow    Problem/Plan - 1:  ·  Problem:Persistent Atrial fibrillation.  Plan: On Amiodarone  Rate controlled  On Xarelto  Repeat TTE  For ablation tomorrow      Problem/Plan - 2:  ·  Problem: Troponin level elevated.  Plan: Troponin elevated.  Will trend.  Likely due to demand  Recent cardiac cath Normal coronaries      Problem/Plan - 3:  ·  Problem: Essential hypertension.  Plan: Will hold HTN meds due to good BP.                 
    SUBJECTIVE / OVERNIGHT EVENTS: pt denies chest pain, shortness of breath     MEDICATIONS  (STANDING):  aMIOdarone    Tablet 200 milliGRAM(s) Oral two times a day  furosemide    Tablet 40 milliGRAM(s) Oral two times a day  metoprolol tartrate 25 milliGRAM(s) Oral two times a day  rivaroxaban 20 milliGRAM(s) Oral with dinner    MEDICATIONS  (PRN):    Vital Signs Last 24 Hrs  T(C): 36.3 (2021 21:22), Max: 36.6 (2021 05:36)  T(F): 97.4 (2021 21:22), Max: 97.8 (2021 05:36)  HR: 73 (:22) (73 - 86)  BP: 123/88 (2021 21:22) (111/75 - 125/88)  BP(mean): --  RR: 18 (2021 21:22) (17 - 18)  SpO2: 99% (:22) (98% - 99%)    Constitutional: No fever, fatigue  Skin: No rash.  Eyes: No recent vision problems or eye pain.  ENT: No congestion, ear pain, or sore throat.  Cardiovascular: No chest pain or palpation.  Respiratory: No cough, shortness of breath, congestion, or wheezing.  Gastrointestinal: No abdominal pain, nausea, vomiting, or diarrhea.  Genitourinary: No dysuria.  Musculoskeletal: No joint swelling.  Neurologic: No headache.    PHYSICAL EXAM:  GENERAL: NAD  EYES: EOMI, PERRLA  NECK: Supple, No JVD  CHEST/LUNG: dec breath sounds at bases  HEART:  S1 , S2 + irreg  ABDOMEN: soft , bs+  EXTREMITIES: trace edema+   NEUROLOGY:alert awake    LABS:      138  |  98  |  13  ----------------------------<  66<L>  3.9   |  25  |  1.09    Ca    9.3      2021 07:42  Phos  3.0       Mg     1.70         TPro  6.6  /  Alb  3.8  /  TBili  1.7<H>  /  DBili      /  AST  54<H>  /  ALT  89<H>  /  AlkPhos  91  07-16    Creatinine Trend: 1.09 <--, 1.21 <--, 1.10 <--, 1.28 <--, 1.21 <--, 1.45 <--                        16.5   5.55  )-----------( 135      ( 2021 07:42 )             50.9     Urine Studies:  Urinalysis Basic - ( 2021 19:36 )    Color: Yellow / Appearance: Clear / S.010 / pH:   Gluc:  / Ketone: Negative  / Bili: Negative / Urobili: Negative mg/dL   Blood:  / Protein: 15 mg/dL / Nitrite: Negative   Leuk Esterase: Negative / RBC: 0-2 /HPF / WBC 0-2   Sq Epi:  / Non Sq Epi: Occasional / Bacteria: Few        CARDIAC MARKERS ( 2021 03:41 )  x     / x     / 115 U/L / x     / 3.0 ng/mL        LIVER FUNCTIONS - ( 2021 03:41 )  Alb: 3.8 g/dL / Pro: 6.6 g/dL / ALK PHOS: 91 U/L / ALT: 89 U/L / AST: 54 U/L / GGT: x             .076 ng/mL / x     / x     / x     / x      CARDIAC MARKERS ( 2021 19:38 )  .078 ng/mL / x     / x     / x     / x            LIVER FUNCTIONS - ( 15 Jul 2021 09:50 )  Alb: 3.3 g/dL / Pro: 6.7 gm/dL / ALK PHOS: 94 U/L / ALT: 129 U/L / AST: 81 U/L / GGT: x               Imaging Personally Reviewed:    Consultant(s) Notes Reviewed:      Care Discussed with Consultants/Other Providers:  
Patient s/p atrial fibrillation/flutter ablation. Tolerated the procedure well. No complications.   Vital signs stable. Telemetry: NSR.  No AFib/flutter episodes overnight.   Denies shortness of breath, palpitations or lightheadedness.  Complained of mild epigastric pain this morning. Some relief after eating.     Vital Signs Last 24 Hrs  T(C): 36.4 (20 Jul 2021 05:15), Max: 37 (19 Jul 2021 22:00)  T(F): 97.6 (20 Jul 2021 05:15), Max: 98.6 (19 Jul 2021 22:00)  HR: 66 (20 Jul 2021 08:13) (66 - 72)  BP: 118/80 (20 Jul 2021 08:13) (101/65 - 132/85)  BP(mean): --  RR: 16 (20 Jul 2021 08:13) (16 - 18)  SpO2: 100% (20 Jul 2021 08:13) (100% - 100%)        Telemetry: Normal sinus rhythm. 70's.  No AFib episodes overnight  MEDICATIONS  (STANDING):  aMIOdarone    Tablet 200 milliGRAM(s) Oral two times a day  colchicine 0.6 milliGRAM(s) Oral every 12 hours  furosemide    Tablet 40 milliGRAM(s) Oral two times a day  metoprolol tartrate 25 milliGRAM(s) Oral two times a day  pantoprazole    Tablet 40 milliGRAM(s) Oral before breakfast  rivaroxaban 20 milliGRAM(s) Oral with dinner    MEDICATIONS  (PRN):          Physical exam:   Gen- patient laying in bed in NAD.   Resp- clear to auscultation. No wheezing, rales or rhonchi  CV- S1 and S2 RRR. No murmurs, gallops or rubs  ABD- soft nontender + bowel sounds  EXT- no edema No calf tenderness. Right groin without bleeding, ecchymosis, hematoma or pain  Neuro- grossly nonfocal                            16.1   4.58  )-----------( 145      ( 19 Jul 2021 07:36 )             49.9       07-19    139  |  101  |  10  ----------------------------<  62<L>  4.4   |  24  |  1.02    Ca    9.3      19 Jul 2021 07:36  Phos  2.7     07-19  Mg     1.60     07-19                          
    SUBJECTIVE / OVERNIGHT EVENTS: pt denies chest pain, shortness of breath     MEDICATIONS  (STANDING):  aMIOdarone    Tablet 200 milliGRAM(s) Oral two times a day  furosemide    Tablet 40 milliGRAM(s) Oral two times a day  metoprolol tartrate 25 milliGRAM(s) Oral two times a day  rivaroxaban 20 milliGRAM(s) Oral with dinner    MEDICATIONS  (PRN):    Vital Signs Last 24 Hrs  T(C): 36.3 (2021 21:22), Max: 36.6 (2021 05:36)  T(F): 97.4 (2021 21:22), Max: 97.8 (2021 05:36)  HR: 73 (:22) (73 - 86)  BP: 123/88 (2021 21:22) (111/75 - 125/88)  BP(mean): --  RR: 18 (2021 21:22) (17 - 18)  SpO2: 99% (:22) (98% - 99%)    Constitutional: No fever, fatigue  Skin: No rash.  Eyes: No recent vision problems or eye pain.  ENT: No congestion, ear pain, or sore throat.  Cardiovascular: No chest pain or palpation.  Respiratory: No cough, shortness of breath, congestion, or wheezing.  Gastrointestinal: No abdominal pain, nausea, vomiting, or diarrhea.  Genitourinary: No dysuria.  Musculoskeletal: No joint swelling.  Neurologic: No headache.    PHYSICAL EXAM:  GENERAL: NAD  EYES: EOMI, PERRLA  NECK: Supple, No JVD  CHEST/LUNG: dec breath sounds at bases  HEART:  S1 , S2 + irreg  ABDOMEN: soft , bs+  EXTREMITIES: trace edema+   NEUROLOGY:alert awake    LABS:      138  |  98  |  13  ----------------------------<  66<L>  3.9   |  25  |  1.09    Ca    9.3      2021 07:42  Phos  3.0       Mg     1.70         TPro  6.6  /  Alb  3.8  /  TBili  1.7<H>  /  DBili      /  AST  54<H>  /  ALT  89<H>  /  AlkPhos  91  07-16    Creatinine Trend: 1.09 <--, 1.21 <--, 1.10 <--, 1.28 <--, 1.21 <--, 1.45 <--                        16.5   5.55  )-----------( 135      ( 2021 07:42 )             50.9     Urine Studies:  Urinalysis Basic - ( 2021 19:36 )    Color: Yellow / Appearance: Clear / S.010 / pH:   Gluc:  / Ketone: Negative  / Bili: Negative / Urobili: Negative mg/dL   Blood:  / Protein: 15 mg/dL / Nitrite: Negative   Leuk Esterase: Negative / RBC: 0-2 /HPF / WBC 0-2   Sq Epi:  / Non Sq Epi: Occasional / Bacteria: Few        CARDIAC MARKERS ( 2021 03:41 )  x     / x     / 115 U/L / x     / 3.0 ng/mL        LIVER FUNCTIONS - ( 2021 03:41 )  Alb: 3.8 g/dL / Pro: 6.6 g/dL / ALK PHOS: 91 U/L / ALT: 89 U/L / AST: 54 U/L / GGT: x             .076 ng/mL / x     / x     / x     / x      CARDIAC MARKERS ( 2021 19:38 )  .078 ng/mL / x     / x     / x     / x            LIVER FUNCTIONS - ( 15 Jul 2021 09:50 )  Alb: 3.3 g/dL / Pro: 6.7 gm/dL / ALK PHOS: 94 U/L / ALT: 129 U/L / AST: 81 U/L / GGT: x               Imaging Personally Reviewed:    Consultant(s) Notes Reviewed:      Care Discussed with Consultants/Other Providers:  
    SUBJECTIVE / OVERNIGHT EVENTS: pt denies chest pain, shortness of breath     MEDICATIONS  (STANDING):  aMIOdarone    Tablet 200 milliGRAM(s) Oral two times a day  furosemide    Tablet 40 milliGRAM(s) Oral two times a day  metoprolol tartrate 25 milliGRAM(s) Oral two times a day  pantoprazole    Tablet 40 milliGRAM(s) Oral before breakfast  rivaroxaban 20 milliGRAM(s) Oral with dinner    MEDICATIONS  (PRN):    Vital Signs Last 24 Hrs  T(C): 36.7 (2021 18:10), Max: 36.8 (2021 05:17)  T(F): 98.1 (2021 18:10), Max: 98.3 (2021 05:17)  HR: 72 (2021 18:10) (71 - 120)  BP: 132/85 (2021 18:10) (130/92 - 137/93)  BP(mean): --  RR: 16 (2021 18:10) (16 - 17)  SpO2: 100% (2021 18:10) (98% - 100%)    Constitutional: No fever, fatigue  Skin: No rash.  Eyes: No recent vision problems or eye pain.  ENT: No congestion, ear pain, or sore throat.  Cardiovascular: No chest pain or palpation.  Respiratory: No cough, shortness of breath, congestion, or wheezing.  Gastrointestinal: No abdominal pain, nausea, vomiting, or diarrhea.  Genitourinary: No dysuria.  Musculoskeletal: No joint swelling.  Neurologic: No headache.    PHYSICAL EXAM:  GENERAL: NAD  EYES: EOMI, PERRLA  NECK: Supple, No JVD  CHEST/LUNG: dec breath sounds at bases  HEART:  S1 , S2 + irreg  ABDOMEN: soft , bs+  EXTREMITIES: trace edema+   NEUROLOGY:alert awake    LABS:      139  |  101  |  10  ----------------------------<  62<L>  4.4   |  24  |  1.02    Ca    9.3      2021 07:36  Phos  2.7     -19  Mg     1.60     -      Creatinine Trend: 1.02 <--, 1.07 <--, 1.09 <--, 1.21 <--, 1.10 <--, 1.28 <--                        16.1   4.58  )-----------( 145      ( 2021 07:36 )             49.9     Urine Studies:  Urinalysis Basic - ( 2021 19:36 )    Color: Yellow / Appearance: Clear / S.010 / pH:   Gluc:  / Ketone: Negative  / Bili: Negative / Urobili: Negative mg/dL   Blood:  / Protein: 15 mg/dL / Nitrite: Negative   Leuk Esterase: Negative / RBC: 0-2 /HPF / WBC 0-2   Sq Epi:  / Non Sq Epi: Occasional / Bacteria: Few                    Imaging Personally Reviewed:    Consultant(s) Notes Reviewed:      Care Discussed with Consultants/Other Providers:

## 2021-07-20 NOTE — DISCHARGE NOTE PROVIDER - NSDCMRMEDTOKEN_GEN_ALL_CORE_FT
albuterol 2.5 mg/3 mL (0.083%) inhalation solution: 3 milliliter(s) inhaled every 4 hours  amoxicillin 500 mg oral tablet: 1 tab(s) orally 3 times a day   Metoprolol Tartrate 25 mg oral tablet: 1 tab(s) orally 2 times a day  predniSONE 10 mg oral tablet: 1 tab(s) orally once a day  Symbicort 80 mcg-4.5 mcg/inh inhalation aerosol: 2 puff(s) inhaled 2 times a day  Xarelto 20 mg oral tablet: 1 tab(s) orally once a day    albuterol 2.5 mg/3 mL (0.083%) inhalation solution: 3 milliliter(s) inhaled every 4 hours  amiodarone 200 mg oral tablet: 1 tab(s) orally 2 times a day  colchicine 0.6 mg oral tablet: 1 tab(s) orally every 12 hours  furosemide 40 mg oral tablet: 1 tab(s) orally 2 times a day  Metoprolol Tartrate 25 mg oral tablet: 1 tab(s) orally 2 times a day  pantoprazole 40 mg oral delayed release tablet: 1 tab(s) orally once a day (before a meal)  Symbicort 80 mcg-4.5 mcg/inh inhalation aerosol: 2 puff(s) inhaled 2 times a day  Xarelto 20 mg oral tablet: 1 tab(s) orally once a day

## 2021-07-20 NOTE — PROGRESS NOTE ADULT - REASON FOR ADMISSION
SVT
transferred to [facility] for ablation
transferred to [facility] for ablation
transferred to [facility] for ablation
transferred to [facility] for atrial fibrillation ablation
transferred to [facility] for ablation
transferred to [facility] for ablation

## 2021-07-20 NOTE — DISCHARGE NOTE PROVIDER - NSDCCPCAREPLAN_GEN_ALL_CORE_FT
PRINCIPAL DISCHARGE DIAGNOSIS  Diagnosis: S/P ablation of atrial fibrillation  Assessment and Plan of Treatment: You underwent successful Afib/Aflutter ablation . Post procedure you had chest pain, and given colchicine which improved your pain. Continue on this as directed for short course .  Please take your medications as prescribed.  Continue to take your blood thinner as prescribed and follow with your physician to monitor your levels.  Low fat diet, reduce caffeine intake, and exercise at least 30 minutes daily.        SECONDARY DISCHARGE DIAGNOSES  Diagnosis: Atrial fibrillation and flutter  Assessment and Plan of Treatment:      PRINCIPAL DISCHARGE DIAGNOSIS  Diagnosis: S/P ablation of atrial fibrillation  Assessment and Plan of Treatment: You underwent successful Afib/Aflutter ablation . Post procedure you had chest pain, and were given colchicine which improved your pain. Continue on this as directed for short course for 5 days .  Please take your medications as prescribed.  Continue to take your blood thinner as prescribed and follow with your physician to monitor your levels.  Low fat diet, reduce caffeine intake, and exercise at least 30 minutes daily.        SECONDARY DISCHARGE DIAGNOSES  Diagnosis: Atrial fibrillation and flutter  Assessment and Plan of Treatment: Please continue your medications as directed and follow-up with your primary provider/cardiologist to further manage your care. Monitor for signs/symptoms of uncontrolled atrial fibrillation, such as, increased heart rate, palpitations, chest pain, dizziness, or shortness of breath - Return to emergency room if these signs/symptoms are present.  Please take your medications as prescribed.  Continue to take your blood thinner and amiodarone as prescribed and follow with your physician Dr Alicja Crane 8/18 at 10a as previously scheduled.  Low fat diet, reduce caffeine intake, and exercise at least 30 minutes daily.      Diagnosis: Asthma  Assessment and Plan of Treatment: Continue your medications as directed and please follow-up as an outpatient with your primary care provider for further care and recommendations. Your asthma has been stable

## 2021-07-20 NOTE — PROVIDER CONTACT NOTE (OTHER) - RECOMMENDATIONS
EKG
TANNER Malone made aware and at bedside to assess patient. 12 lead obtained showing aflutter. PO Metprolol and PO Amiodarone to be given.
RN recommend to TANNER Malone to have h&P completed.
Per ACP recommendations.

## 2021-07-20 NOTE — PROVIDER CONTACT NOTE (OTHER) - ACTION/TREATMENT ORDERED:
EKG done and results reviewed. ACP to come to bedside. Pending DOMINGUEZ with ablation on 7/19. No further interventions indicated at this time.
TANNER Malone made aware and at bedside to assess patient. 12 lead obtained showing aflutter. PO Metprolol and PO Amiodarone to be given.
EKG performed, echo ordered
As per TANNER Malone, "I will not be completing an H&P. There is an H&P from [place] and a transfer note from Dr. Whitt, accepting physican at [facility]."

## 2021-07-20 NOTE — PROVIDER CONTACT NOTE (OTHER) - ASSESSMENT
A&Ox4, NSR in 60s on telemetry monitoring. c/o 4/10 chest pain and chest tightness, denies any radiation. states chest pain started this AM. Denies SOB. Vital signs stable.
Pt. asymptomatic, found to be asleep in bed.
patient c/o of palpitations. HR noted to be aflutter/afib with RVR to 130s on tele monitor. patient denies chest pain/sob,
patient a&o4. vitally signs as charted.

## 2021-07-20 NOTE — DISCHARGE NOTE PROVIDER - PROVIDER TOKENS
PROVIDER:[TOKEN:[[id number]],FOLLOWUP:[1 week],ESTABLISHEDPATIENT:[T]],PROVIDER:[TOKEN:[[id number]],SCHEDULEDAPPT:[08/18/2021],SCHEDULEDAPPTTIME:[10:00 AM],ESTABLISHEDPATIENT:[T]]

## 2021-07-20 NOTE — PROVIDER CONTACT NOTE (OTHER) - REASON
patient transfer from [facility] - no H&P in chart
Heart rate known to be bouncing up to the 140's
patient HR sustaining 130's on cardiac monitor
patient c/o 4/10 chest pain and chest tightness

## 2021-07-20 NOTE — PROGRESS NOTE ADULT - PROBLEM SELECTOR PLAN 3
had extensive lengthy discussion with pt in detail about pts clinical [facility], management plan   all questions/ concerns addressed   [facility] code [id number]
Will hold HTN meds due to good BP

## 2021-07-20 NOTE — PROGRESS NOTE ADULT - ASSESSMENT
Patient is a 65M with a PMH of Afib on ASA, recently started on Xarelto, asthma, hx of monomorphic VTac s/p ablation in 2019 who presents to the ED for palpitations.  Patient states that yesterday he started feeling short of breath so he took a nebulizer for what he assumed was his asthma.  Patient reports developing severe palpitations, called EMS who found him diaphoretic with unobtainable BP and HR of 220.  Patient was cardioverted at 100J in the field to sinus rhythm and brought to [facility] on 7/12.  Was discharged from ED with cardio follow up (Dr Ha.)  Patient was started on xarelto by cardio today in office.  Developed dyspnea at home again, had a nebulizer and developed palpitations again and called EMS.  Currently has no active complaints.  Tachy to 130 in ED, labs show mildly elevated troponin level.          IMPROVE VTE Individual Risk Assessment          RISK                                                          Points  [  ] Previous VTE                                                3  [  ] Thrombophilia                                             2  [  ] Lower limb paralysis                                    2        (unable to hold up >15 seconds)    [  ] Current Cancer                                             2         (within 6 months)  [  ] Immobilization > 24 hrs                              1  [  ] ICU/CCU stay > 24 hours                            1  [  ] Age > 60                                                    1    IMPROVE VTE Score - 1

## 2021-07-20 NOTE — DISCHARGE NOTE PROVIDER - CARE PROVIDER_API CALL
Jatinder Ha  CARDIOVASCULAR DISEASE  [address] [place] [address]  Phone: [phone]  Fax: [phone]  Established Patient  Follow Up Time: 1 week    Alicja Crane)  Cardiovascular Disease; Internal Medicine  [address] [place] [address]  Phone: [phone]  Fax: [phone]  Established Patient  Scheduled Appointment: 08/18/2021 10:00 AM

## 2021-07-20 NOTE — PROGRESS NOTE ADULT - PROVIDER SPECIALTY LIST ADULT
Cardiology
Electrophysiology
Anesthesia
Internal Medicine

## 2021-07-20 NOTE — PROGRESS NOTE ADULT - ASSESSMENT
65M with pmhx asthma and atrial fibrillation s/p ablation at [facility], in 2019, off anticoagulation until recently (now on Xarelto) who presented to [facility] c/o palpitation X1 month (7/13).  Palpitations were associated with diaphoresis, transient SOB and nausea. He initially BIBEMS to [facility] ED 7/12/21 and was successfully cardioverted to sinus rhythm with 100J by EMS for hemodynamic instability. He was started on Xarelto & discharged with plan for cardiology follow up with Dr. Ha. On Tuesday(7/13), he felt dyspneic and took his nebulizer, thinking it was his asthma. But when there was no  relief. he presented [facility] where he was admitted with AFib/Flutter w/RVR. Transferred to [facility] for possible ablation.   Of note, pt was admitted on 2019 with a WCT in the setting of chest tightness and palpitations and cardiomyopathy with LVEF 35-40%; treated with amiodarone 150mg IVP and infusion. . Transferred to [facility] where he underwent cardiac cath which demonstrated normal coronaries.  He subsuquently underwent ablation for what turned out to be atrial flutter with aberrancy.  EKG this admission showed typical atrial flutter with variable block (127)and PVC's. No EKG for review on 7/12/21 admission. He is now s/p AFib/Flutter ablation 7/19//21. Now in NSR.   This morning he complained of mild epigastric discomfort.  No shortness of breath, palpitations or lightheadedness.   Plan:  Started on colchicine 0.6mg bid x 5 days           Echocardiogram this morning.            Continue current medications.                  Post procedure ablation teaching done. Written instructions and contact information provided.            Labs reviewed and stable.           He has a follow-up appointment with Dr. Crane on 8/18/21 at 10:00am  4th floor Oncology Building [phone].                           65M with pmhx asthma and atrial fibrillation s/p ablation at [facility], in 2019, off anticoagulation until recently (now on Xarelto) who presented to [facility] c/o palpitation X1 month (7/13).  Palpitations were associated with diaphoresis, transient SOB and nausea. He initially BIBEMS to [facility] ED 7/12/21 and was successfully cardioverted to sinus rhythm with 100J by EMS for hemodynamic instability. He was started on Xarelto & discharged with plan for cardiology follow up with Dr. Ha. On Tuesday(7/13), he felt dyspneic and took his nebulizer, thinking it was his asthma. But when there was no  relief. he presented [facility] where he was admitted with AFib/Flutter w/RVR. Transferred to [facility] for possible ablation.   Of note, pt was admitted on 2019 with a WCT in the setting of chest tightness and palpitations and cardiomyopathy with LVEF 35-40%; treated with amiodarone 150mg IVP and infusion. . Transferred to [facility] where he underwent cardiac cath which demonstrated normal coronaries.  He subsuquently underwent ablation for what turned out to be atrial flutter with aberrancy.  EKG this admission showed typical atrial flutter with variable block (127)and PVC's. No EKG for review on 7/12/21 admission. He is now s/p AFib/Flutter ablation 7/19//21. Now in NSR.   This morning he complained of mild epigastric discomfort.  No associated shortness of breath, palpitations or lightheadedness. Low suspicion for pericarditis, more likely GERD given significant improvement after eating. However, will get echocardiogram and start on colchicine .6mg po bid x 5 day course.   Plan:  Started on colchicine 0.6mg bid x 5 days           Echocardiogram this morning.            Continue current medications.                  Post procedure ablation teaching done. Written instructions and contact information provided.            Labs reviewed and stable.           He has a follow-up appointment with Dr. Crane on 8/18/21 at 10:00am  4th floor Oncology Building [phone].                           65M with pmhx asthma and atrial fibrillation s/p ablation at [facility], in 2019, off anticoagulation until recently (now on Xarelto) who presented to [facility] c/o palpitation X1 month (7/13).  Palpitations were associated with diaphoresis, transient SOB and nausea. He initially BIBEMS to [facility] ED 7/12/21 and was successfully cardioverted to sinus rhythm with 100J by EMS for hemodynamic instability. He was started on Xarelto & discharged with plan for cardiology follow up with Dr. Ha. On Tuesday(7/13), he felt dyspneic and took his nebulizer, thinking it was his asthma. But when there was no  relief. he presented [facility] where he was admitted with AFib/Flutter w/RVR. Transferred to [facility] for possible ablation.   Of note, pt was admitted on 2019 with a WCT in the setting of chest tightness and palpitations and cardiomyopathy with LVEF 35-40%; treated with amiodarone 150mg IVP and infusion. . Transferred to [facility] where he underwent cardiac cath which demonstrated normal coronaries.  He subsuquently underwent ablation for what turned out to be atrial flutter with aberrancy.  EKG this admission showed typical atrial flutter with variable block (127)and PVC's. No EKG for review on 7/12/21 admission. He is now s/p AFib/Flutter ablation 7/19//21. Now in NSR.   This morning he complained of mild epigastric discomfort.  No associated shortness of breath, palpitations or lightheadedness. Low suspicion for pericarditis, more likely GERD given significant improvement after eating. However, will get echocardiogram and start on colchicine .6mg po bid x 5 day course.   Plan:  Started on colchicine 0.6mg bid x 5 days           Echocardiogram this morning.            Continue current medications: Amiodarone, metoprolol, Xarelto, Protonix, Lasix.                  Post procedure ablation teaching done. Written instructions and contact information provided.            Labs reviewed and stable.           He has a follow-up appointment with Dr. Crane on 8/18/21 at 10:00am  4th floor Oncology Building [phone].

## 2021-07-20 NOTE — PROGRESS NOTE ADULT - ATTENDING COMMENTS
65M with pmhx asthma and atrial fibrillation s/p ablation at [facility], in 2019, off anticoagulation until last week when he presented with recurrent palpitations and was found to be in AF at [facility]. He was started on Xarelto. His EKG here demonstrated typical atrial flutter. He underwent an EPS and ablation yesterday. All four veins were noted to be open, and no evidence of block across the CTI was found. The left pulmonary veins had narrow ostia, possibly due to prior ablation. All four veins were re-isolated. An AT was noted to be coming from the LSPV. CTI block was achieved. Patient should continue Amio and BB for now. Can d/c in clinic. Patient with some pericardial pain today. Will start colchicine.

## 2021-07-20 NOTE — DISCHARGE NOTE PROVIDER - HOSPITAL COURSE
65M with pmhx asthma and atrial fibrillation s/p ablation at [facility], in 2019, off anticoagulation until recently (now on Xarelto) who presented to [facility] c/o palpitation X1 month (7/13).  Palpitations were associated with diaphoresis, transient SOB and nausea. He initially BIBEMS to [facility] ED 7/12/21 and was successfully cardioverted to sinus rhythm with 100J by EMS for hemodynamic instability. He was started on Xarelto & discharged with plan for cardiology follow up with Dr. Ha. On Tuesday(7/13), he felt dyspneic and took his nebulizer, thinking it was his asthma. But when there was no  relief. he presented [facility] where he was admitted with AFib/Flutter w/RVR. Transferred to [facility] for possible ablation.   Mr. Santiago is a 65 year old male with pmhx asthma and atrial fibrillation s/p ablation at [facility], in 2019, off anticoagulation until recently (now on Xarelto) who presented to [facility] c/o palpitation X1 month (7/13).  Palpitations were associated with diaphoresis, transient SOB and nausea. He initially BIBEMS to [facility] ED 7/12/21 and was successfully cardioverted to sinus rhythm with 100J by EMS for hemodynamic instability. He was started on Xarelto & discharged with plan for cardiology follow up with Dr. Ha. On Tuesday(7/13), he felt dyspneic and took his nebulizer, thinking it was his asthma. But when there was no  relief. he presented [facility] where he was admitted with AFib/Flutter w/RVR. Transferred to [facility] 7/15 for possible ablation. Evaluated by EP Dr Crane. Patient underwent successful AFib/Aflutter ablation 7/19 via right femoral vein access, closed via Vascade device. Post procedure patient had some intermittent chest discomfort. ECHO EF 60% with Minimal mitral regurgitation, grossly normal left ventricular systolic function, grossly normal right ventricular systolic function, and moderate pulmonary hypertension. Pain resolved after receiving colchicine. EP team by to evaluate. Medically stable and cleared for discharge, from EP standpoint.      Patient seen and evaluated. Afebrile, VSS. Tolerating diet. Voiding without difficulty. Having regular bowel function. Patient denies any chest pain, palpitations, shortness of breath, nausea, vomiting, or any other c/o's or new concerns. Patient case reviewed and discussed with Attending Physician Dr Whitt who agrees the patient is medically stable and cleared for discharge with outpatient follow up with Cardiologist Dr Ha, and with EP Dr Helm as scheduled, along with Primary Care Physician in 1 week for ongoing medical management. All medications were reviewed and prescriptions were sent to mutually agreed upon pharmacy. All questions and concerns addressed and answered to patient's satisfaction. Patient understands and agrees with plan of care.

## 2021-07-20 NOTE — PROGRESS NOTE ADULT - PROBLEM SELECTOR PLAN 1
cont current dose of amio   ep f/u   pos ablation on monday
cont current dose of amio   ep f/u   s/p ablation cards cleared pt for dc
cont current dose of amio   ep f/u   pos ablation on monday
cont current dose of amio   ep f/u   poss ablation today

## 2021-07-21 ENCOUNTER — TRANSCRIPTION ENCOUNTER (OUTPATIENT)
Age: 66
End: 2021-07-21

## 2021-07-21 VITALS
HEART RATE: 73 BPM | OXYGEN SATURATION: 100 % | RESPIRATION RATE: 17 BRPM | SYSTOLIC BLOOD PRESSURE: 138 MMHG | DIASTOLIC BLOOD PRESSURE: 92 MMHG | TEMPERATURE: 98 F

## 2021-07-21 RX ORDER — AMIODARONE HYDROCHLORIDE 400 MG/1
1 TABLET ORAL
Qty: 60 | Refills: 0
Start: 2021-07-21 | End: 2021-08-19

## 2021-07-21 RX ORDER — PANTOPRAZOLE SODIUM 20 MG/1
1 TABLET, DELAYED RELEASE ORAL
Qty: 30 | Refills: 0
Start: 2021-07-21 | End: 2021-08-19

## 2021-07-21 RX ORDER — COLCHICINE 0.6 MG
1 TABLET ORAL
Qty: 10 | Refills: 0
Start: 2021-07-21 | End: 2021-07-25

## 2021-07-21 RX ORDER — FUROSEMIDE 40 MG
1 TABLET ORAL
Qty: 60 | Refills: 0
Start: 2021-07-21 | End: 2021-08-19

## 2021-07-21 RX ADMIN — Medication 25 MILLIGRAM(S): at 05:24

## 2021-07-21 RX ADMIN — Medication 0.6 MILLIGRAM(S): at 05:40

## 2021-07-21 RX ADMIN — Medication 40 MILLIGRAM(S): at 05:24

## 2021-07-21 RX ADMIN — AMIODARONE HYDROCHLORIDE 200 MILLIGRAM(S): 400 TABLET ORAL at 05:24

## 2021-07-21 RX ADMIN — PANTOPRAZOLE SODIUM 40 MILLIGRAM(S): 20 TABLET, DELAYED RELEASE ORAL at 05:24

## 2021-07-21 NOTE — DISCHARGE NOTE NURSING/CASE MANAGEMENT/SOCIAL WORK - PATIENT PORTAL LINK FT
You can access the FollowMyHealth Patient Portal offered by [facility] by registering at the following website: [email or web address]. By joining [facility]’s FollowMyHealth portal, you will also be able to view your health information using other applications (apps) compatible with our system.

## 2021-07-21 NOTE — DISCHARGE NOTE NURSING/CASE MANAGEMENT/SOCIAL WORK - NSDCVIVACCINE_GEN_ALL_CORE_FT
influenza, injectable, quadrivalent, preservative free; 11-Feb-2020 15:47; Allison Mcdaniel (RN); [employer]; [id number] (Exp. Date: 30-Jun-2020); IntraMuscular; Deltoid Left.; 0.5 milliLiter(s); VIS (VIS Published: 15-Aug-2019, VIS Presented: 11-Feb-2020);

## 2021-07-21 NOTE — DISCHARGE NOTE NURSING/CASE MANAGEMENT/SOCIAL WORK - NSDCPEXARELTOREACT_GEN_ALL_CORE
Alert-The patient is alert, awake and responds to voice. The patient is oriented to time, place, and person. The triage nurse is able to obtain subjective information. Rivaroxaban/Xarelto increases your risk for bleeding. Notify your doctor if you experience any of the following side effects: unusual bleeding or bruising, vomiting blood or coffee ground-like material, red or black stool, itching or hives, chest tightness, trouble breathing, swelling in your face or hands, swelling in your mouth or throat, change in how much or how often you urinate, red or brown urine, heavy menstrual or vaginal bleeding, or blistering or peeling skin. When Rivaroxaban/Xarelto is taken with other medicines, they can affect how it works. Taking other medications such as aspirin, antibiotics, antifungals, blood thinners, nonsteroidal anti-inflammatories, and medications that treat depression can increase your risk of bleeding. It is very important to tell your health care provider about all of the other medicines, including over-the-counter medications, herbs, and vitamins you are taking.  DO NOT start, stop, or change the dosage of any medicine, including over-the-counter medicines, vitamins, and herbal products without your doctor’s approval.  Any products containing aspirin or are nonsteroidal anti-inflammatories lessen the blood’s ability to form clots and adds to the effect of Rivaroxaban/Xarelto. Never take aspirin or medicines that contain aspirin without speaking to your doctor.

## 2021-07-27 DIAGNOSIS — I10 ESSENTIAL (PRIMARY) HYPERTENSION: ICD-10-CM

## 2021-08-18 ENCOUNTER — APPOINTMENT (OUTPATIENT)
Dept: ELECTROPHYSIOLOGY | Facility: CLINIC | Age: 66
End: 2021-08-18
Payer: COMMERCIAL

## 2021-08-18 ENCOUNTER — NON-APPOINTMENT (OUTPATIENT)
Age: 66
End: 2021-08-18

## 2021-08-18 VITALS
HEIGHT: 71 IN | DIASTOLIC BLOOD PRESSURE: 87 MMHG | HEART RATE: 71 BPM | RESPIRATION RATE: 16 BRPM | SYSTOLIC BLOOD PRESSURE: 142 MMHG | OXYGEN SATURATION: 100 % | TEMPERATURE: 97 F | WEIGHT: 168 LBS | BODY MASS INDEX: 23.52 KG/M2

## 2021-08-18 DIAGNOSIS — I48.91 UNSPECIFIED ATRIAL FIBRILLATION: ICD-10-CM

## 2021-08-18 DIAGNOSIS — I48.3 TYPICAL ATRIAL FLUTTER: ICD-10-CM

## 2021-08-18 PROCEDURE — 93000 ELECTROCARDIOGRAM COMPLETE: CPT

## 2021-08-18 PROCEDURE — 99214 OFFICE O/P EST MOD 30 MIN: CPT

## 2021-08-18 RX ORDER — AMIODARONE HYDROCHLORIDE 200 MG/1
200 TABLET ORAL
Refills: 0 | Status: DISCONTINUED | COMMUNITY
End: 2021-08-18

## 2021-08-18 RX ORDER — METOPROLOL TARTRATE 25 MG/1
25 TABLET, FILM COATED ORAL TWICE DAILY
Qty: 30 | Refills: 5 | Status: DISCONTINUED | COMMUNITY
End: 2021-08-18

## 2021-08-18 RX ORDER — FUROSEMIDE 40 MG/1
40 TABLET ORAL
Refills: 0 | Status: ACTIVE | COMMUNITY

## 2021-08-18 RX ORDER — LISINOPRIL 2.5 MG/1
2.5 TABLET ORAL DAILY
Qty: 30 | Refills: 5 | Status: DISCONTINUED | COMMUNITY
End: 2021-08-18

## 2021-08-18 RX ORDER — APIXABAN 5 MG/1
5 TABLET, FILM COATED ORAL
Qty: 60 | Refills: 5 | Status: DISCONTINUED | COMMUNITY
End: 2021-08-18

## 2021-08-18 RX ORDER — ALBUTEROL SULFATE 90 UG/1
108 (90 BASE) AEROSOL, METERED RESPIRATORY (INHALATION) EVERY 4 HOURS
Qty: 1 | Refills: 5 | Status: DISCONTINUED | COMMUNITY
Start: 2019-03-07 | End: 2021-08-18

## 2021-08-18 RX ORDER — COLCHICINE 0.6 MG/1
0.6 TABLET ORAL TWICE DAILY
Refills: 0 | Status: ACTIVE | COMMUNITY

## 2021-08-18 RX ORDER — ASPIRIN 81 MG/1
81 TABLET, CHEWABLE ORAL
Refills: 0 | Status: DISCONTINUED | COMMUNITY
End: 2021-08-18

## 2021-08-18 RX ORDER — RIVAROXABAN 20 MG/1
20 TABLET, FILM COATED ORAL
Qty: 30 | Refills: 2 | Status: ACTIVE | COMMUNITY

## 2021-08-18 RX ORDER — METOPROLOL TARTRATE 25 MG/1
25 TABLET, FILM COATED ORAL TWICE DAILY
Refills: 0 | Status: DISCONTINUED | COMMUNITY
End: 2021-08-18

## 2021-08-18 RX ORDER — ALBUTEROL SULFATE 2.5 MG/3ML
(2.5 MG/3ML) SOLUTION RESPIRATORY (INHALATION)
Refills: 0 | Status: ACTIVE | COMMUNITY

## 2021-08-18 RX ORDER — PANTOPRAZOLE 40 MG/1
40 TABLET, DELAYED RELEASE ORAL
Refills: 0 | Status: ACTIVE | COMMUNITY

## 2021-08-18 RX ORDER — DILTIAZEM HYDROCHLORIDE 120 MG/1
120 CAPSULE, EXTENDED RELEASE ORAL DAILY
Qty: 30 | Refills: 11 | Status: ACTIVE | COMMUNITY
Start: 2021-08-18 | End: 1900-01-01

## 2021-08-18 RX ORDER — BUDESONIDE AND FORMOTEROL FUMARATE DIHYDRATE 80; 4.5 UG/1; UG/1
80-4.5 AEROSOL RESPIRATORY (INHALATION) TWICE DAILY
Qty: 1 | Refills: 1 | Status: ACTIVE | COMMUNITY

## 2021-08-18 NOTE — DISCUSSION/SUMMARY
[FreeTextEntry1] : In summary, this is a 65 year old man with symptomatic AF/AFL who is now s/p ablation. He is doing well. He is in sinus rhythm today. He has not had any further episodes. I would continue AC with Xarelto at this time. He was instructed to stop Amio. Given that he has been using his inhalers more for asthma since starting Metoprolol, I will switch him to Dilt 120mg ER daily. \par  \par Mr. Santiago appeared to understand the whole discussion and verbalized that all of his questions were answered to his satisfaction. He will return to clinic in 3 months.\par  \par Thank you for allowing me to be involved in the care of this pleasant man. Please feel free to contact me with any questions.\par

## 2021-08-18 NOTE — PHYSICAL EXAM

## 2021-08-18 NOTE — CARDIOLOGY SUMMARY
[de-identified] : 8/18/21 - sinus rhythm at 72 bpm. [de-identified] : 7/19/21 DOMINGUEZ - 1. Mild-moderate mitral regurgitation. 2. Normal trileaflet aortic valve. 3. Normal aortic root, aortic arch and descending thoracic aorta. 4. Normal left atrium. No left atrial or left atrial appendage thrombus. 5. Endocardium not well visualized; grossly moderate left ventricular systolic dysfunction. 6. Normal right ventricular size and function. 7. Contrast injection demonstrates no evidence of a patent foramen ovale.\par \par 7/20/21 TTE - 1. Mitral annular calcification, otherwise normal mitral valve. Minimal mitral regurgitation. 2. Normal left ventricular internal dimensions and wall thicknesses. 3. Endocardium not well visualized; grossly normal left ventricular systolic function.  Paradoxical septal wall motion.  Endocardial visualization enhanced with intravenous injection of echo contrast (Definity).\par 4. The right ventricle is not well visualized; grossly normal right ventricular systolic function. 5. Estimated right ventricular systolic pressure equals 60mm Hg, assuming right atrial pressure equals 10 mm Hg, consistent with moderate pulmonary hypertension. [de-identified] : 7/19/21 ablation - All four veins were noted to be open, and no evidence of block across the CTI was found. The left pulmonary veins had narrow ostia, possibly due to prior ablation. All four veins were re-isolated. An AT was noted to be coming from the LSPV. CTI block was achieved.

## 2021-08-18 NOTE — HISTORY OF PRESENT ILLNESS
[FreeTextEntry1] : Referring Physician Gisela Ha MD:\par  \par Dear Dr. Ha:\par  \par Mr. Santiago was seen in the [facility] today. For our records, please allow me to summarize the history and my findings.\par  \par This pleasant 65 year old man has a cardiovascular history significant for atrial fibrillation and atrial flutter. The patient has a PMHx significant for asthma. He had initially presented to the our healthcare system in 2019 with a WCT that was thought to be AF/AFL with aberrancy. He was offered an ablation in house at the time, but it seems that he may have gone out of our system to get the ablation at [facility]. He has been doing well and taken off AC until he presented to [facility] ED on 7/12/21 in AF with RVR. He was cardioverted by EMS for reported hemodynamic instability. He was seen by Cardiology and started on Xarelto and subsequently discharged home. He returned to the [facility] with palpitations and found to be in AF/AFL. He was started on Metoprolol and Amio, and transferred to [facility] for ablation. Echo showed preserved EF. His EKG demonstrated typical AFL at the time. He underwent an AF/AFL ablation on 7/19/21. Since, he has been in sinus rhythm. States he feels well without recurrent palpitations. He has transient pericarditis post procedure which has since resolved. He was treated with colchicine. He endorses that he has been needing his rescue inhaler more now since the start of the metoprolol. He denies any bleeding issues on the Xarelto. \par  \par Mr. Santiago denies any recent history of chest pain, shortness of breath, palpitations, dizziness, or syncope.\par

## 2022-05-10 NOTE — PATIENT PROFILE ADULT - NSPROMUTANXFEARADDRESSFT_GEN_A_NUR
pt presents for UGI evaluation. pt notes recent months intermittent epigastric/ruq burning pain. pt notes feeling of burning running up esophagus,. pt notes food association, no nausea, vomiting or dysphagia. pt denies nsaids/asa. pt denies LGI symptoms including diarrhea, constipation or rectal bleeding. pt went to ED recent with chest pain, cardiac eval negative, started on PPI qd, referred to GI. na

## 2022-12-05 NOTE — PROGRESS NOTE ADULT - PROBLEM SELECTOR PROBLEM 1
"RN entered patient's room to deliver lunch tray. Patient verbalized to RN \" I am having difficultly breathing\" She is on the nasal cannula at 2L. RN then placed patient on the Bipap and notified Murtaza PEGUERO of the above.    Patient stated relief.  "
Chronic atrial fibrillation
Chronic atrial fibrillation

## 2023-02-06 NOTE — ED ADULT NURSE NOTE - GENITOURINARY WDL
balance training/bed mobility training/gait training/strengthening/transfer training
Bladder non-tender and non-distended. Urine clear yellow.

## 2023-03-03 NOTE — ED ADULT NURSE NOTE - NS_ED_NURSE_RECEIVING_FACILITY _ED_ALL_ED
[facility] Bactrim Counseling:  I discussed with the patient the risks of sulfa antibiotics including but not limited to GI upset, allergic reaction, drug rash, diarrhea, dizziness, photosensitivity, and yeast infections.  Rarely, more serious reactions can occur including but not limited to aplastic anemia, agranulocytosis, methemoglobinemia, blood dyscrasias, liver or kidney failure, lung infiltrates or desquamative/blistering drug rashes.

## 2023-03-16 NOTE — ED PROVIDER NOTE - ATTENDING CONTRIBUTION TO CARE
66 yo m past medical history afib s/p ablation on dapt and metoprolol, no known hx of CAD, asthma, presents after syn cardioversion by EMS for SVT. patient reports palpitations since yesterday, no cp fever chills, sob, n/v. ems report rate in 200 and low BP so cardioverted. patient denies current symptoms.   T(C): 36.7 (12 Jul 2021 17:15), Max: 36.7 (12 Jul 2021 17:15)  T(F): 98.1 (12 Jul 2021 17:15), Max: 98.1 (12 Jul 2021 17:15)  HR: 80 (12 Jul 2021 17:15) (80 - 80)  BP: 119/102 (12 Jul 2021 17:15) (119/102 - 119/102)  BP(mean): --  RR: 18 (12 Jul 2021 17:15) (18 - 18)  SpO2: 100% (12 Jul 2021 17:15) (100% - 100%)  exam as abovr  ekg nsr, no acute ischemia, intervals changes   plan: labs, cxr reassess  spoke with [facility] cardiology: sharon ha [phone]. not sure while patient not on 10a (patient reports sob with eliquis, per Dr Ha he does think he should only be on DAPT). card ok with starting xarelto, and dc to see him in the office tomorrow if med cleared otherwise. No

## 2023-04-26 NOTE — DISCHARGE NOTE ADULT - COMPLETE THE FOLLOWING IF THE PATIENT REFUSES THE INFLUENZA VACCINE:
Spinal Block    Date/Time: 4/26/2023 9:25 AM    Performed by: Yanira Santos MD  Authorized by: Yanira Santos MD    Patient Location:  OR  Indication: primary anesthetic    Pre anesthesia checklist Patient Identified (2 criteria), Timeout Performed, Resuscitation equipment available, IV Bolus, Allergies confirmed, Monitors applied, Coagulation Status, Block site marked (if applicable), Sedation given (if needed), Block Plan Confirmed, Drugs/Solutions Labeled, IV Access functioning, Necessary Block Equipment Present, Consent Verified and Aseptic technique   Patient Position:  Sitting  Prep:  Chlorhexidine gluconate (CHG)  Max Sterile Barrier Technique:  Hand washing, cap/mask, sterile gloves and sterile drape  Monitoring:  Continuous pulse ox, EKG and NIBP  Approach:  Midline  Local Infiltration:  1% Lidocaine  Location:  L3-4  Injection Technique:  Single-shot  Needle Type:  Pencil-tip  Needle Gauge:  24 G  Needle Length:  10 cm    Assessment:   Number of Attempts: 1   Events: CSF from needle   Procedure Assessment: patient tolerated procedure well with no complications  Start Time:  4/26/2023 9:25 AM  Stop Time: 4/26/2023 9:30 AM      
Risks/benefits discussed with patient/surrogate

## 2023-12-04 NOTE — ED ADULT NURSE NOTE - HOW OFTEN DO YOU HAVE A DRINK CONTAINING ALCOHOL?
I will give him 1 refill on the Lasix, but he needs to have labs done. BMP ordered. Please advise patient to go to the [facility] at [facility] to get lab done this week.   
Never

## 2023-12-06 NOTE — ED ADULT TRIAGE NOTE - RESPIRATORY RATE (BREATHS/MIN)
Benita from [facility] called wanting to get a VO for this patient for one more week of SN    Benita can be reached at [phone]    Please Advise   
Tell OK  
16

## 2024-02-18 NOTE — CHART NOTE - NSCHARTNOTESELECT_GEN_ALL_CORE
Event Note
Event Note
Post Op
Pt presents to ED with L shoulder pain for the last few days. Denies injury, pain with ROM.   Hx of mitral valve replacement.   
Event Note
Site Check/Event Note
Tachycardia/Event Note

## 2024-05-06 NOTE — ED ADULT NURSE NOTE - CHIEF COMPLAINT QUOTE
BIBA- constipation X1 week. Drank prune juice with positive effects but now c/o CP/ABD and palpitations. HX afib, on eliquis, metoprolol Post-Procedure Instructions: Following the dermaplane procedure, Oxymist treatment was applied to the treatment areas. Moisturizer and SPF was applied. Treatment Areas: face Treatment Area Prep: alcohol Post-Care Instructions: I reviewed with the patient in detail post-care instructions. Blade: [place] scalpel Detail Level: Zone Price (Use Numbers Only, No Special Characters Or $): 75 Pre-Procedure Text: The patient was placed in a recumbant position on the procedure table.